# Patient Record
Sex: FEMALE | Race: WHITE | NOT HISPANIC OR LATINO | Employment: PART TIME | URBAN - METROPOLITAN AREA
[De-identification: names, ages, dates, MRNs, and addresses within clinical notes are randomized per-mention and may not be internally consistent; named-entity substitution may affect disease eponyms.]

---

## 2017-01-03 ENCOUNTER — HOSPITAL ENCOUNTER (EMERGENCY)
Facility: HOSPITAL | Age: 57
Discharge: HOME | End: 2017-01-03
Attending: EMERGENCY MEDICINE

## 2017-01-03 ENCOUNTER — APPOINTMENT (OUTPATIENT)
Dept: GENERAL RADIOLOGY | Facility: HOSPITAL | Age: 57
End: 2017-01-03

## 2017-01-03 VITALS
HEART RATE: 68 BPM | RESPIRATION RATE: 18 BRPM | SYSTOLIC BLOOD PRESSURE: 166 MMHG | HEIGHT: 68 IN | OXYGEN SATURATION: 98 % | DIASTOLIC BLOOD PRESSURE: 94 MMHG | WEIGHT: 210 LBS | BODY MASS INDEX: 31.83 KG/M2 | TEMPERATURE: 98.8 F

## 2017-01-03 DIAGNOSIS — S99.921A INJURY OF TOENAIL, RIGHT, INITIAL ENCOUNTER: Primary | ICD-10-CM

## 2017-01-03 PROCEDURE — 73660 X-RAY EXAM OF TOE(S): CPT

## 2017-01-03 PROCEDURE — 99283 EMERGENCY DEPT VISIT LOW MDM: CPT | Performed by: EMERGENCY MEDICINE

## 2017-01-03 PROCEDURE — 99283 EMERGENCY DEPT VISIT LOW MDM: CPT

## 2017-01-03 PROCEDURE — 11730 AVULSION NAIL PLATE SIMPLE 1: CPT | Performed by: EMERGENCY MEDICINE

## 2017-01-03 PROCEDURE — 11730 AVULSION NAIL PLATE SIMPLE 1: CPT

## 2017-01-03 RX ORDER — BACITRACIN ZINC 500 [USP'U]/G
1 OINTMENT TOPICAL ONCE
Status: DISCONTINUED | OUTPATIENT
Start: 2017-01-03 | End: 2017-01-03 | Stop reason: HOSPADM

## 2017-01-03 RX ORDER — CEPHALEXIN 500 MG/1
500 CAPSULE ORAL ONCE
Status: COMPLETED | OUTPATIENT
Start: 2017-01-03 | End: 2017-01-03

## 2017-01-03 RX ORDER — BUPIVACAINE HYDROCHLORIDE 5 MG/ML
10 INJECTION, SOLUTION EPIDURAL; INTRACAUDAL ONCE
Status: DISCONTINUED | OUTPATIENT
Start: 2017-01-03 | End: 2017-01-03 | Stop reason: HOSPADM

## 2017-01-03 RX ORDER — CEPHALEXIN 500 MG/1
500 CAPSULE ORAL 4 TIMES DAILY
Qty: 28 CAPSULE | Refills: 0 | Status: SHIPPED | OUTPATIENT
Start: 2017-01-03 | End: 2017-01-10

## 2017-01-03 RX ADMIN — CEPHALEXIN 500 MG: 500 CAPSULE ORAL at 04:52

## 2017-01-03 NOTE — ED PROVIDER NOTES
Subjective   History of Present Illness  History of Present Illness    Chief complaint: Toenail injury    Location: Right great toe    Quality/Severity:  Moderate pain and bleeding    Timing/Duration: Occurred about 2 hours ago    Modifying Factors: worse w/ bearing weight or ambulation    Narrative: Patient presents for evaluation after an accidental injury to her toe occurred at home this evening.  Apparently she and her son were trying to move a box spring on the floor and it accidentally moved quickly against her foot while she was barefoot.  This caused an immediate injury to her right great toe and actually pushed the toenail entirely backwards into a flipped up position.  There was some immediate bleeding and pain.  She pushed the toenail back down into it's correct position and held pressure to stop the bleeding.  She denies any other areas of injury or pain at this time.    Associated Symptoms: As above    Review of Systems   Constitutional: Negative for activity change, appetite change and fever.   HENT: Negative.    Eyes: Negative for pain and visual disturbance.   Respiratory: Negative for cough and shortness of breath.    Cardiovascular: Negative for chest pain.   Gastrointestinal: Negative for abdominal pain.   Genitourinary: Negative for dysuria.   Musculoskeletal: Positive for arthralgias and gait problem (due to toe pain).   Skin: Positive for wound. Negative for color change and rash.   Neurological: Negative for syncope and headaches.   All other systems reviewed and are negative.      Past Medical History   Diagnosis Date   • Diabetes mellitus    • Herpes zoster    • Hyperlipidemia    • Hypertension    • Myocardial infarction        No Known Allergies    Past Surgical History   Procedure Laterality Date   • Coronary artery bypass graft     •  section     • Knee surgery     • Breast lumpectomy     • Cardiac surgery  2014   • Breast lumpectomy       for benign lump       Family  History   Problem Relation Age of Onset   • Hypertension Father    • Diabetes Father    • Glaucoma Father    • Hypotension Father    • Kidney failure Father    • Anxiety disorder Father    • Hypertension Sister    • Diabetes Sister    • Anxiety disorder Sister    • Osteoporosis Mother    • Cataracts Mother    • Heart failure Mother        Social History     Social History   • Marital status: Legally      Spouse name: N/A   • Number of children: N/A   • Years of education: N/A     Social History Main Topics   • Smoking status: Former Smoker     Packs/day: 1.00     Years: 15.00     Quit date: 7/9/2014   • Smokeless tobacco: None   • Alcohol use Yes   • Drug use: No   • Sexual activity: Not Asked     Other Topics Concern   • None     Social History Narrative       ED Triage Vitals   Temp Heart Rate Resp BP SpO2   01/03/17 0037 01/03/17 0037 01/03/17 0037 01/03/17 0037 01/03/17 0037   98.8 °F (37.1 °C) 68 18 166/94 98 %      Temp src Heart Rate Source Patient Position BP Location FiO2 (%)   -- -- -- -- --                Objective   Physical Exam   Constitutional: She is oriented to person, place, and time. She appears well-developed and well-nourished.   HENT:   Head: Normocephalic and atraumatic.   Eyes: EOM are normal. Pupils are equal, round, and reactive to light. Right eye exhibits no discharge. Left eye exhibits no discharge.   Neck: Normal range of motion. Neck supple.   Cardiovascular: Normal rate and regular rhythm.    Pulmonary/Chest: Effort normal. No respiratory distress.   Musculoskeletal: Normal range of motion. She exhibits tenderness. She exhibits no edema or deformity.   The right great toe is diffusely tender at the distal phalanx.  There is some mild swelling present.  The toenail itself shows a moderate injury with some hematoma underneath the nail and the nail entirely loose with only its most proximal aspect attached underneath the cuticle.  There is no visible nailbed laceration evident  under bloodless field.  Patient can move her toe through full range of motion in flexion and extension.  Distal sensation is intact to the tip of the toe.   Neurological: She is alert and oriented to person, place, and time.   Skin: Skin is warm and dry. No rash noted. No erythema.   Psychiatric: She has a normal mood and affect. Her behavior is normal. Judgment and thought content normal.   Nursing note and vitals reviewed.    RADIOLOGY        Study: X-ray toe    Findings: No fracture or dislocation    Interpreted Contemporaneously by myself, independently viewed by me        Nail Removal  Date/Time: 1/3/2017 3:45 AM  Performed by: JOANIE BRAN  Authorized by: JOANIE BRAN   Consent: Verbal consent obtained.  Risks and benefits: risks, benefits and alternatives were discussed  Consent given by: patient  Patient understanding: patient states understanding of the procedure being performed  Patient consent: the patient's understanding of the procedure matches consent given  Procedure consent: procedure consent matches procedure scheduled  Patient identity confirmed: verbally with patient  Location: right foot  Location details: right big toe  Anesthesia: digital block    Anesthesia:  Anesthesia: digital block  Local Anesthetic: bupivacaine 0.5% without epinephrine   Sedation:  Patient sedated: no    Preparation: skin prepped with alcohol  Amount removed: partial (removed the distal 2/3)  Wedge excision of skin of nail fold: no  Nail bed sutured: no  Removed nail replaced and anchored: preserved proximal nail was well anchored under the cuticle by innate atttachment.  Dressing: antibiotic ointment and 4x4  Patient tolerance: Patient tolerated the procedure well with no immediate complications (post-op shoe provided)               ED Course  ED Course                  MDM    Final diagnoses:   Injury of toenail, right, initial encounter            Joanie Bran MD  01/03/17 0451

## 2017-01-03 NOTE — DISCHARGE INSTRUCTIONS
Keep toenail wound clean and covered with dressing for protection.  Please return to the ER for any worsening pain, redness, swelling, fevers, drainage, or any other concerns.

## 2017-01-18 RX ORDER — CARVEDILOL 3.12 MG/1
TABLET ORAL
Qty: 60 TABLET | Refills: 4 | Status: SHIPPED | OUTPATIENT
Start: 2017-01-18 | End: 2017-06-20 | Stop reason: SDUPTHER

## 2017-01-18 RX ORDER — LISINOPRIL 10 MG/1
TABLET ORAL
Qty: 60 TABLET | Refills: 4 | Status: SHIPPED | OUTPATIENT
Start: 2017-01-18 | End: 2017-06-21 | Stop reason: SDUPTHER

## 2017-02-17 RX ORDER — ATORVASTATIN CALCIUM 40 MG/1
TABLET, FILM COATED ORAL
Qty: 30 TABLET | Refills: 3 | Status: SHIPPED | OUTPATIENT
Start: 2017-02-17 | End: 2017-06-20 | Stop reason: SDUPTHER

## 2017-04-20 RX ORDER — PANTOPRAZOLE SODIUM 40 MG/1
TABLET, DELAYED RELEASE ORAL
Qty: 30 TABLET | Refills: 3 | Status: SHIPPED | OUTPATIENT
Start: 2017-04-20 | End: 2017-09-25 | Stop reason: SDUPTHER

## 2017-05-14 ENCOUNTER — APPOINTMENT (OUTPATIENT)
Dept: GENERAL RADIOLOGY | Facility: HOSPITAL | Age: 57
End: 2017-05-14

## 2017-05-14 ENCOUNTER — HOSPITAL ENCOUNTER (EMERGENCY)
Facility: HOSPITAL | Age: 57
Discharge: HOME | End: 2017-05-14
Attending: EMERGENCY MEDICINE | Admitting: EMERGENCY MEDICINE

## 2017-05-14 VITALS
SYSTOLIC BLOOD PRESSURE: 172 MMHG | HEIGHT: 68 IN | WEIGHT: 200 LBS | BODY MASS INDEX: 30.31 KG/M2 | OXYGEN SATURATION: 96 % | DIASTOLIC BLOOD PRESSURE: 86 MMHG | TEMPERATURE: 98.2 F | HEART RATE: 65 BPM | RESPIRATION RATE: 18 BRPM

## 2017-05-14 DIAGNOSIS — S53.401A ELBOW SPRAIN, RIGHT, INITIAL ENCOUNTER: Primary | ICD-10-CM

## 2017-05-14 PROCEDURE — 73080 X-RAY EXAM OF ELBOW: CPT

## 2017-05-14 PROCEDURE — 99283 EMERGENCY DEPT VISIT LOW MDM: CPT

## 2017-05-14 PROCEDURE — 99284 EMERGENCY DEPT VISIT MOD MDM: CPT | Performed by: EMERGENCY MEDICINE

## 2017-05-14 RX ORDER — HYDROCODONE BITARTRATE AND ACETAMINOPHEN 5; 325 MG/1; MG/1
1-2 TABLET ORAL EVERY 4 HOURS PRN
Qty: 20 TABLET | Refills: 0 | Status: SHIPPED | OUTPATIENT
Start: 2017-05-14 | End: 2017-07-14

## 2017-06-20 RX ORDER — ATORVASTATIN CALCIUM 40 MG/1
TABLET, FILM COATED ORAL
Qty: 30 TABLET | Refills: 0 | Status: SHIPPED | OUTPATIENT
Start: 2017-06-20 | End: 2017-07-22 | Stop reason: SDUPTHER

## 2017-06-20 RX ORDER — CARVEDILOL 3.12 MG/1
TABLET ORAL
Qty: 60 TABLET | Refills: 5 | Status: SHIPPED | OUTPATIENT
Start: 2017-06-20 | End: 2018-01-06 | Stop reason: SDUPTHER

## 2017-06-21 RX ORDER — LISINOPRIL 10 MG/1
TABLET ORAL
Qty: 60 TABLET | Refills: 3 | Status: SHIPPED | OUTPATIENT
Start: 2017-06-21 | End: 2017-10-29 | Stop reason: SDUPTHER

## 2017-06-26 ENCOUNTER — TELEPHONE (OUTPATIENT)
Dept: INTERNAL MEDICINE | Facility: CLINIC | Age: 57
End: 2017-06-26

## 2017-06-26 NOTE — TELEPHONE ENCOUNTER
----- Message from Renay Barbosa MA sent at 6/26/2017  1:18 PM EDT -----      ----- Message -----     From: Ashleigh Pace     Sent: 6/26/2017  12:34 PM       [facility]    PATIENT NEEDS A NEW METER AND TEST STRIPS BECAUSE HERS WILL NOT WORK ANYMORE.  SHE SAID SHE CHECKS ABOUT 4 X DAY.  SHE REALLY WANTS THE CHEAPEST ONE SHE CAN GET PLEASE..  [facility] IN [place]    [phone]  IF NO ANSWER, PLEASE LEAVE A MESSAGE      FAXED SCRIPT TO [facility], LEFT MESSAGE FOR PT

## 2017-07-14 ENCOUNTER — OFFICE VISIT (OUTPATIENT)
Dept: INTERNAL MEDICINE | Facility: CLINIC | Age: 57
End: 2017-07-14

## 2017-07-14 ENCOUNTER — TELEPHONE (OUTPATIENT)
Dept: INTERNAL MEDICINE | Facility: CLINIC | Age: 57
End: 2017-07-14

## 2017-07-14 ENCOUNTER — HOSPITAL ENCOUNTER (OUTPATIENT)
Dept: GENERAL RADIOLOGY | Facility: HOSPITAL | Age: 57
Discharge: HOME | End: 2017-07-14
Attending: INTERNAL MEDICINE | Admitting: INTERNAL MEDICINE

## 2017-07-14 VITALS
DIASTOLIC BLOOD PRESSURE: 82 MMHG | OXYGEN SATURATION: 98 % | BODY MASS INDEX: 32.16 KG/M2 | HEART RATE: 52 BPM | HEIGHT: 68 IN | WEIGHT: 212.2 LBS | SYSTOLIC BLOOD PRESSURE: 132 MMHG | TEMPERATURE: 97.7 F

## 2017-07-14 DIAGNOSIS — M79.671 FOOT PAIN, RIGHT: Primary | ICD-10-CM

## 2017-07-14 DIAGNOSIS — E78.5 HYPERLIPIDEMIA, UNSPECIFIED HYPERLIPIDEMIA TYPE: ICD-10-CM

## 2017-07-14 DIAGNOSIS — I10 ESSENTIAL HYPERTENSION: ICD-10-CM

## 2017-07-14 DIAGNOSIS — G57.91 NEUROPATHY OF FOOT, RIGHT: ICD-10-CM

## 2017-07-14 DIAGNOSIS — IMO0001 UNCONTROLLED TYPE 2 DIABETES MELLITUS WITHOUT COMPLICATION, WITH LONG-TERM CURRENT USE OF INSULIN: ICD-10-CM

## 2017-07-14 PROBLEM — T81.49XA POSTOPERATIVE WOUND INFECTION: Status: RESOLVED | Noted: 2017-07-14 | Resolved: 2017-07-14

## 2017-07-14 PROBLEM — E11.9 DIABETES MELLITUS (HCC): Status: RESOLVED | Noted: 2017-07-14 | Resolved: 2017-07-14

## 2017-07-14 PROBLEM — R07.9 CHEST PAIN: Status: ACTIVE | Noted: 2017-07-14

## 2017-07-14 PROBLEM — J30.2 SEASONAL ALLERGIC RHINITIS: Status: ACTIVE | Noted: 2017-07-14

## 2017-07-14 PROBLEM — T14.8XXA OPEN WOUND: Status: ACTIVE | Noted: 2017-07-14

## 2017-07-14 PROBLEM — M54.2 NECK PAIN: Status: ACTIVE | Noted: 2017-07-14

## 2017-07-14 PROBLEM — T81.49XA POSTOPERATIVE WOUND INFECTION: Status: ACTIVE | Noted: 2017-07-14

## 2017-07-14 PROBLEM — E11.9 TYPE 2 DIABETES MELLITUS: Status: ACTIVE | Noted: 2017-07-14

## 2017-07-14 PROBLEM — G57.90 NEUROPATHY OF FOOT: Status: ACTIVE | Noted: 2017-07-14

## 2017-07-14 PROBLEM — R07.9 CHEST PAIN: Status: RESOLVED | Noted: 2017-07-14 | Resolved: 2017-07-14

## 2017-07-14 PROBLEM — M75.80 ROTATOR CUFF TENDONITIS: Status: ACTIVE | Noted: 2017-07-14

## 2017-07-14 PROBLEM — T14.8XXA OPEN WOUND: Status: RESOLVED | Noted: 2017-07-14 | Resolved: 2017-07-14

## 2017-07-14 PROBLEM — E11.9 DIABETES MELLITUS (HCC): Status: ACTIVE | Noted: 2017-07-14

## 2017-07-14 LAB — HBA1C MFR BLD: 7.8 %

## 2017-07-14 PROCEDURE — 83036 HEMOGLOBIN GLYCOSYLATED A1C: CPT | Performed by: INTERNAL MEDICINE

## 2017-07-14 PROCEDURE — 73630 X-RAY EXAM OF FOOT: CPT

## 2017-07-14 PROCEDURE — 99214 OFFICE O/P EST MOD 30 MIN: CPT | Performed by: INTERNAL MEDICINE

## 2017-07-14 RX ORDER — CHOLECALCIFEROL (VITAMIN D3) 125 MCG
5 CAPSULE ORAL NIGHTLY
COMMUNITY

## 2017-07-14 NOTE — TELEPHONE ENCOUNTER
----- Message from Daysi Cardenas MD sent at 7/14/2017  1:48 PM EDT -----  Please call patient with these results and let her know that her x-ray is normal, but she does have some bone spurs on the back of her heal. Sometimes these will get better with PT and stretching. If she would like to see PT, we can place order. Otherwise, nothing further to do.    Pt wants to go to foot doctor instead of [facility] so gave the info to [facility] for referral.papi

## 2017-07-14 NOTE — PROGRESS NOTES
"Subjective     Kristine Rivas is a 57 y.o. female, who presents with a chief complaint of   Chief Complaint   Patient presents with   • Follow-up     Dr. Duckworth pt, DM f/u, patient is fasting    • Diabetes     x questions    • Pain     R foot, \"a while now\", throbing, \"spots\" on heel x questions, worse at night after work        HPI Comments: 56 yo F with diabetes here for follow up. Her mother passed in January and it has been hard for her to make follow ups. Her eye exam is up to date and was normal.     She takes her BG's around 10 am and it runs around 100-120. She checks it 4 times per day and goes up throughout the day.She takes 28 units of NPH at night and 15-20 for breakfast and lunch and then 25-45 at night. She buys NPH over the counter because it is cheap.     She started having some right sided heal pain starting 2 months ago. She then started having some numbness on the outer part of her foot.She has nodules around the heel. All of her pain is worse when she is on her feet. She stands 9-10 hours on her feet.     HTN is chronic and under good control. No headache or dizziness. No CP or SOB.       The following portions of the patient's history were reviewed and updated as appropriate: allergies, current medications, past family history, past medical history, past social history, past surgical history and problem list.    Allergies: Review of patient's allergies indicates no known allergies.    Current Outpatient Prescriptions:   •  acetaminophen (TYLENOL) 500 MG tablet, Take 1,000 mg by mouth 2 (Two) Times a Day., Disp: , Rfl:   •  aspirin 81 MG tablet, Take 1 tablet by mouth daily., Disp: , Rfl:   •  atorvastatin (LIPITOR) 40 MG tablet, TAKE ONE TABLET BY MOUTH DAILY, Disp: 30 tablet, Rfl: 0  •  carvedilol (COREG) 3.125 MG tablet, TAKE ONE TABLET BY MOUTH TWICE A DAY, Disp: 60 tablet, Rfl: 5  •  Fexofenadine HCl (MUCINEX ALLERGY PO), Take  by mouth As Needed., Disp: , Rfl:   •  insulin NPH (humuLIN " "N,novoLIN N) 100 UNIT/ML injection, Inject 30 Units under the skin Every Night., Disp: , Rfl:   •  insulin regular (humuLIN R,novoLIN R) 100 UNIT/ML injection, Inject 15 Units under the skin 3 (Three) Times a Day Before Meals. 15-20 units at breakfast  15-20 units at lunch  25-45 units at dinner, Disp: , Rfl:   •  lisinopril (PRINIVIL,ZESTRIL) 10 MG tablet, TAKE ONE TABLET BY MOUTH TWICE A DAY, Disp: 60 tablet, Rfl: 3  •  melatonin 5 MG tablet tablet, Take 5 mg by mouth., Disp: , Rfl:   •  pantoprazole (PROTONIX) 40 MG EC tablet, TAKE ONE TABLET BY MOUTH DAILY, Disp: 30 tablet, Rfl: 3  Medications Discontinued During This Encounter   Medication Reason   • azithromycin (ZITHROMAX Z-ANGY) 250 MG tablet    • HYDROcodone-acetaminophen (NORCO) 5-325 MG per tablet    • insulin NPH (HumuLIN,NovoLIN) 100 UNIT/ML injection    • insulin regular (HumuLIN R,NovoLIN R) 100 UNIT/ML injection    • insulin regular (humuLIN R,novoLIN R) 100 UNIT/ML injection        Review of Systems   Constitutional: Negative for chills and fever.   HENT: Negative for congestion and rhinorrhea.    Respiratory: Negative for cough and shortness of breath.    Cardiovascular: Negative for chest pain and palpitations.   Musculoskeletal: Positive for arthralgias (Right foot pain in heal and tingling and numbness ).       Objective     /82 (BP Location: Left arm, Patient Position: Sitting, Cuff Size: Adult)  Pulse 52  Temp 97.7 °F (36.5 °C) (Oral)   Ht 68\" (172.7 cm)  Wt 212 lb 3.2 oz (96.3 kg)  SpO2 98%  BMI 32.26 kg/m2      Physical Exam   Constitutional: She is oriented to person, place, and time. She appears well-developed and well-nourished. No distress.   HENT:   Head: Normocephalic and atraumatic.   Right Ear: External ear normal.   Left Ear: External ear normal.   Mouth/Throat: Oropharynx is clear and moist. No oropharyngeal exudate.   Eyes: Conjunctivae are normal. Right eye exhibits no discharge. Left eye exhibits no discharge. No " scleral icterus.   Neck: Neck supple.   Cardiovascular: Normal rate, regular rhythm and normal heart sounds.  Exam reveals no gallop and no friction rub.    No murmur heard.  Pulmonary/Chest: Effort normal and breath sounds normal. No respiratory distress. She has no wheezes. She has no rales.   Central chest scar from CABG     Kristine had a diabetic foot exam performed today.   During the foot exam she had a monofilament test performed.    Neurological Sensory Findings - Unaltered hot/cold right ankle/foot discrimination. Unaltered sharp/dull right ankle/foot discrimination (Decreased sensation in the outer lateral foot) and unaltered sharp/dull left ankle/foot discrimination. No right ankle/foot altered proprioception and no left ankle/foot altered proprioception    Vascular Status -  Her exam exhibits right foot vasculature normal. Her exam exhibits no right foot edema. Her exam exhibits left foot vasculature normal. Her exam exhibits no left foot edema.   Skin Integrity  -  Her right foot skin is not intact.   Kristine's left foot skin is not intact. .  Lymphadenopathy:     She has no cervical adenopathy.   Neurological: She is alert and oriented to person, place, and time.   Skin: Skin is warm. No rash noted.   Psychiatric: She has a normal mood and affect. Her behavior is normal.   Nursing note and vitals reviewed.        Results for orders placed or performed in visit on 07/14/17   POC Glycosylated Hemoglobin (Hb A1C)   Result Value Ref Range    Hemoglobin A1C 7.8 %       Assessment/Plan   Kristine was seen today for follow-up, diabetes and pain.    Diagnoses and all orders for this visit:    Foot pain, right  -     XR Foot 3+ View Right    Uncontrolled type 2 diabetes mellitus without complication, with long-term current use of insulin  -     CBC & Differential  -     Comprehensive Metabolic Panel  -     Urinalysis With / Culture If Indicated  -     POC Glycosylated Hemoglobin (Hb A1C)    Hyperlipidemia,  unspecified hyperlipidemia type  -     Lipid Panel With LDL / HDL Ratio    Essential hypertension  -     Comprehensive Metabolic Panel    Neuropathy of foot, right        Her foot is likely related to bone spurs or diabetes. Will check x-ray and follow up after that. Differential includes diabetic neuropathy vs injury vs arthritis vs peripheral neuropathy. She may need podiatry or ortho referral and will consider Gabapentin for pain.     Patient's type 2 diabetes is under fair control . Will continue current regimen of NPH, but increase to 30 units and go up to 32 if she is still having high fasting and continue SSI with humulin.No reported side effects from medications. Will follow up in 3 months and needs CBC, CMP, HgA1c, lipids and micro albumin then.     Will check lipids today and will continue statin. Will call with results.     HTN is under good control and patient will continue to monitor with home BP cuff. No side effects from medications. Will continue current regimen of Coreg and Lisinopril. Will follow up in 3 months.     See 3 weeks back for pap and well women check due to some concerns about vaginal dryness.                      Return in about 3 months (around 10/14/2017) for Recheck with Dr. Duckworth with labs before .    Daysi Cardenas MD  07/14/2017

## 2017-07-14 NOTE — PROGRESS NOTES
Please call patient with these results and let her know that her x-ray is normal, but she does have some bone spurs on the back of her heal. Sometimes these will get better with PT and stretching. If she would like to see PT, we can place order. Otherwise, nothing further to do.

## 2017-07-15 LAB
ALBUMIN SERPL-MCNC: 4.4 G/DL (ref 3.5–5.2)
ALBUMIN/GLOB SERPL: 1.9 G/DL
ALP SERPL-CCNC: 90 U/L (ref 40–129)
ALT SERPL-CCNC: 13 U/L (ref 5–33)
APPEARANCE UR: CLEAR
AST SERPL-CCNC: 13 U/L (ref 5–32)
BACTERIA #/AREA URNS HPF: NORMAL /HPF
BASOPHILS # BLD AUTO: 0.02 10*3/MM3 (ref 0–0.2)
BASOPHILS NFR BLD AUTO: 0.3 % (ref 0–2)
BILIRUB SERPL-MCNC: 0.5 MG/DL (ref 0.2–1.2)
BILIRUB UR QL STRIP: NEGATIVE
BUN SERPL-MCNC: 16 MG/DL (ref 6–20)
BUN/CREAT SERPL: 21.9 (ref 7–25)
CALCIUM SERPL-MCNC: 9 MG/DL (ref 8.6–10.5)
CHLORIDE SERPL-SCNC: 102 MMOL/L (ref 98–107)
CHOLEST SERPL-MCNC: 146 MG/DL (ref 0–200)
CO2 SERPL-SCNC: 27.7 MMOL/L (ref 22–29)
COLOR UR: YELLOW
CREAT SERPL-MCNC: 0.73 MG/DL (ref 0.57–1)
EOSINOPHIL # BLD AUTO: 0.27 10*3/MM3 (ref 0.1–0.3)
EOSINOPHIL NFR BLD AUTO: 4.6 % (ref 0–4)
EPI CELLS #/AREA URNS HPF: NORMAL /HPF
ERYTHROCYTE [DISTWIDTH] IN BLOOD BY AUTOMATED COUNT: 14.6 % (ref 11.5–14.5)
GLOBULIN SER CALC-MCNC: 2.3 GM/DL
GLUCOSE SERPL-MCNC: 152 MG/DL (ref 65–99)
GLUCOSE UR QL: NEGATIVE
HCT VFR BLD AUTO: 38 % (ref 37–47)
HDLC SERPL-MCNC: 59 MG/DL (ref 40–60)
HGB BLD-MCNC: 12 G/DL (ref 12–16)
HGB UR QL STRIP: NEGATIVE
IMM GRANULOCYTES # BLD: 0.02 10*3/MM3 (ref 0–0.03)
IMM GRANULOCYTES NFR BLD: 0.3 % (ref 0–0.5)
KETONES UR QL STRIP: NEGATIVE
LDLC SERPL CALC-MCNC: 70 MG/DL (ref 0–100)
LDLC/HDLC SERPL: 1.19 {RATIO}
LEUKOCYTE ESTERASE UR QL STRIP: NEGATIVE
LYMPHOCYTES # BLD AUTO: 2.39 10*3/MM3 (ref 0.6–4.8)
LYMPHOCYTES NFR BLD AUTO: 40.4 % (ref 20–45)
MCH RBC QN AUTO: 26.3 PG (ref 27–31)
MCHC RBC AUTO-ENTMCNC: 31.6 G/DL (ref 31–37)
MCV RBC AUTO: 83.3 FL (ref 81–99)
MICRO URNS: NORMAL
MICRO URNS: NORMAL
MONOCYTES # BLD AUTO: 0.39 10*3/MM3 (ref 0–1)
MONOCYTES NFR BLD AUTO: 6.6 % (ref 3–8)
MUCOUS THREADS URNS QL MICRO: PRESENT /HPF
NEUTROPHILS # BLD AUTO: 2.82 10*3/MM3 (ref 1.5–8.3)
NEUTROPHILS NFR BLD AUTO: 47.8 % (ref 45–70)
NITRITE UR QL STRIP: NEGATIVE
NRBC BLD AUTO-RTO: 0 /100 WBC (ref 0–0)
PH UR STRIP: 6 [PH] (ref 5–7.5)
PLATELET # BLD AUTO: 286 10*3/MM3 (ref 140–500)
POTASSIUM SERPL-SCNC: 4.7 MMOL/L (ref 3.5–5.2)
PROT SERPL-MCNC: 6.7 G/DL (ref 6–8.5)
PROT UR QL STRIP: NEGATIVE
RBC # BLD AUTO: 4.56 10*6/MM3 (ref 4.2–5.4)
RBC #/AREA URNS HPF: NORMAL /HPF
SODIUM SERPL-SCNC: 142 MMOL/L (ref 136–145)
SP GR UR: 1.02 (ref 1–1.03)
TRIGL SERPL-MCNC: 83 MG/DL (ref 0–150)
URINALYSIS REFLEX: NORMAL
UROBILINOGEN UR STRIP-MCNC: 0.2 MG/DL (ref 0.2–1)
VLDLC SERPL CALC-MCNC: 16.6 MG/DL (ref 7–27)
WBC # BLD AUTO: 5.91 10*3/MM3 (ref 4.8–10.8)
WBC #/AREA URNS HPF: NORMAL /HPF

## 2017-07-16 ENCOUNTER — HOSPITAL ENCOUNTER (EMERGENCY)
Facility: HOSPITAL | Age: 57
Discharge: HOME | End: 2017-07-16
Attending: EMERGENCY MEDICINE | Admitting: EMERGENCY MEDICINE

## 2017-07-16 VITALS
BODY MASS INDEX: 32.08 KG/M2 | HEIGHT: 68 IN | OXYGEN SATURATION: 95 % | SYSTOLIC BLOOD PRESSURE: 157 MMHG | DIASTOLIC BLOOD PRESSURE: 96 MMHG | RESPIRATION RATE: 20 BRPM | HEART RATE: 63 BPM | TEMPERATURE: 98.1 F | WEIGHT: 211.64 LBS

## 2017-07-16 DIAGNOSIS — M43.6 LEFT TORTICOLLIS: Primary | ICD-10-CM

## 2017-07-16 PROCEDURE — 99283 EMERGENCY DEPT VISIT LOW MDM: CPT

## 2017-07-16 PROCEDURE — 99282 EMERGENCY DEPT VISIT SF MDM: CPT | Performed by: EMERGENCY MEDICINE

## 2017-07-16 RX ORDER — TRAMADOL HYDROCHLORIDE 50 MG/1
TABLET ORAL
Qty: 24 TABLET | Refills: 0 | Status: SHIPPED | OUTPATIENT
Start: 2017-07-16 | End: 2019-04-15

## 2017-07-16 RX ORDER — DICLOFENAC SODIUM 75 MG/1
75 TABLET, DELAYED RELEASE ORAL 2 TIMES DAILY PRN
Qty: 20 TABLET | Refills: 0 | OUTPATIENT
Start: 2017-07-16 | End: 2017-12-13

## 2017-07-16 RX ORDER — METAXALONE 800 MG/1
800 TABLET ORAL 4 TIMES DAILY PRN
Qty: 24 TABLET | Refills: 0 | Status: SHIPPED | OUTPATIENT
Start: 2017-07-16 | End: 2019-04-23

## 2017-07-16 NOTE — ED PROVIDER NOTES
Subjective     History provided by:  Patient    History of Present Illness    · Chief complaint: Neck pain    · Location: Neck pain behind the left ear.    · Quality/Severity: Sharp pain     · Timing/Onset: The pain started yesterday afternoon.    · Modifying Factors: Turning her head in either direction exacerbates the pain.    · Associated symptoms: The patient reports a fullness sensation in her left ear and wonders if that might be causing the left neck pain.    · Narrative: The patient is a 57-year-old white female use complaining of left-sided neck pain behind her left ear since yesterday afternoon.  The pain is exacerbated by any movement of her head.  It is improved by applying pressure to the left side of the neck and not moving her head.  She reports a pressure sensation in her left ear, and wonders if that might be causing discomfort.  The patient states she's been sleeping in a more awkward position as her pelvis to become flat.  She denies any hearing deficit or ringing in her ears.    ED Triage Vitals   Temp Heart Rate Resp BP SpO2   07/16/17 1711 07/16/17 1711 07/16/17 1711 07/16/17 1711 07/16/17 1711   98.1 °F (36.7 °C) 63 20 157/96 95 %      Temp src Heart Rate Source Patient Position BP Location FiO2 (%)   07/16/17 1711 -- 07/16/17 1711 07/16/17 1711 --   Oral  Sitting Right arm        Review of Systems   Constitutional: Negative for activity change, appetite change, chills, diaphoresis, fatigue and fever.   HENT: Negative for congestion, dental problem, ear discharge, ear pain, hearing loss, mouth sores, postnasal drip, rhinorrhea, sinus pressure, sore throat, tinnitus, trouble swallowing and voice change.    Eyes: Negative for photophobia, pain, discharge, redness and visual disturbance.   Respiratory: Negative for cough, chest tightness, shortness of breath, wheezing and stridor.    Cardiovascular: Negative for chest pain, palpitations and leg swelling.   Gastrointestinal: Negative for abdominal  pain, diarrhea, nausea and vomiting.   Genitourinary: Negative for difficulty urinating, dysuria, flank pain, frequency, hematuria and urgency.   Musculoskeletal: Positive for neck pain and neck stiffness. Negative for arthralgias, back pain, gait problem, joint swelling and myalgias.   Skin: Negative for color change and rash.   Neurological: Negative for dizziness, tremors, seizures, syncope, facial asymmetry, speech difficulty, weakness, light-headedness, numbness and headaches.   Hematological: Negative for adenopathy.   Psychiatric/Behavioral: Negative.  Negative for confusion and decreased concentration. The patient is not nervous/anxious.        Past Medical History:   Diagnosis Date   • Diabetes mellitus    • Herpes zoster    • Hyperlipidemia    • Hypertension    • Myocardial infarction        No Known Allergies    Past Surgical History:   Procedure Laterality Date   • BREAST LUMPECTOMY     • BREAST LUMPECTOMY      for benign lump   • CARDIAC SURGERY  2014   •  SECTION     • CORONARY ARTERY BYPASS GRAFT     • KNEE SURGERY         Family History   Problem Relation Age of Onset   • Hypertension Father    • Diabetes Father    • Glaucoma Father    • Hypotension Father    • Kidney failure Father    • Anxiety disorder Father    • Hypertension Sister    • Diabetes Sister    • Anxiety disorder Sister    • Osteoporosis Mother    • Cataracts Mother    • Heart failure Mother        Social History     Social History   • Marital status: Legally      Spouse name: N/A   • Number of children: N/A   • Years of education: N/A     Social History Main Topics   • Smoking status: Former Smoker     Packs/day: 1.00     Years: 15.00     Quit date: 2014   • Smokeless tobacco: None   • Alcohol use Yes   • Drug use: No   • Sexual activity: Not Asked     Other Topics Concern   • None     Social History Narrative           Objective   Physical Exam   Constitutional: She is oriented to person, place, and time.  She appears well-developed and well-nourished. No distress.   HENT:   Head: Normocephalic and atraumatic.   Right Ear: External ear normal.   Left Ear: External ear normal.   Nose: Nose normal.   Mouth/Throat: Oropharynx is clear and moist.   Both tympanic membranes are normal in appearance.  There is no inflammation or exudate in the external canals.   Eyes: Conjunctivae and EOM are normal. Pupils are equal, round, and reactive to light.   Neck:   The patient has spasm of the left lateral and posterior cervical musculature that resists any movement due to pain.  There is no tenderness to palpation of the musculature and soft tissue of either side of the neck, nor of the cervical spine.  Is no palpable lymphadenopathy or swelling.   Musculoskeletal: Normal range of motion. She exhibits no edema, tenderness or deformity.   Lymphadenopathy:     She has no cervical adenopathy.   Neurological: She is alert and oriented to person, place, and time. No cranial nerve deficit.   No focal motor sensory deficit   Skin: Skin is warm and dry. No rash noted. She is not diaphoretic. No erythema. No pallor.   Psychiatric: She has a normal mood and affect. Her behavior is normal. Judgment and thought content normal.   Nursing note and vitals reviewed.      Procedures         ED Course  ED Course   Comment By Time   Juventino Report [id number] is blank Harshad Miles MD 07/16 5032                  MDM  Number of Diagnoses or Management Options  Left torticollis: new and does not require workup  Risk of Complications, Morbidity, and/or Mortality  Presenting problems: low  Diagnostic procedures: minimal  Management options: low    Patient Progress  Patient progress: stable      Final diagnoses:   Left torticollis           Labs Reviewed - No data to display  No orders to display          Medication List      New Prescriptions          diclofenac 75 MG EC tablet   Commonly known as:  VOLTAREN   Take 1 tablet by mouth 2 (Two) Times a Day As  Needed (Pain) for up to 20   doses.       metaxalone 800 MG tablet   Commonly known as:  SKELAXIN   Take 1 tablet by mouth 4 (Four) Times a Day As Needed for Muscle Spasms   for up to 24 doses.       traMADol 50 MG tablet   Commonly known as:  ULTRAM   1 - 2 tablets po q 4 hours PRN sever pain                Harshad Miles MD  07/16/17 1500

## 2017-07-17 ENCOUNTER — TELEPHONE (OUTPATIENT)
Dept: INTERNAL MEDICINE | Facility: CLINIC | Age: 57
End: 2017-07-17

## 2017-07-17 DIAGNOSIS — M77.51 OTHER ENTHESOPATHY OF RIGHT FOOT: Primary | ICD-10-CM

## 2017-07-17 NOTE — PROGRESS NOTES
Please call patient with these results and let her know that her labs look very good. Her cholesterol is continuing to get much better. I want her to continue the changes that we discussed and plan for follow up as scheduled.

## 2017-07-17 NOTE — TELEPHONE ENCOUNTER
Left message for patient to call us back for results.   ----- Message from Daysi Cardenas MD sent at 7/17/2017  7:58 AM EDT -----  Please call patient with these results and let her know that her labs look very good. Her cholesterol is continuing to get much better. I want her to continue the changes that we discussed and plan for follow up as scheduled.

## 2017-07-23 RX ORDER — ATORVASTATIN CALCIUM 40 MG/1
TABLET, FILM COATED ORAL
Qty: 90 TABLET | Refills: 0 | Status: SHIPPED | OUTPATIENT
Start: 2017-07-23 | End: 2017-10-31 | Stop reason: SDUPTHER

## 2017-08-04 ENCOUNTER — OFFICE VISIT (OUTPATIENT)
Dept: INTERNAL MEDICINE | Facility: CLINIC | Age: 57
End: 2017-08-04

## 2017-08-04 VITALS
OXYGEN SATURATION: 98 % | WEIGHT: 210.2 LBS | DIASTOLIC BLOOD PRESSURE: 90 MMHG | SYSTOLIC BLOOD PRESSURE: 144 MMHG | BODY MASS INDEX: 31.86 KG/M2 | HEART RATE: 62 BPM | HEIGHT: 68 IN

## 2017-08-04 DIAGNOSIS — Z78.0 POST-MENOPAUSAL: ICD-10-CM

## 2017-08-04 DIAGNOSIS — I10 ESSENTIAL HYPERTENSION: ICD-10-CM

## 2017-08-04 DIAGNOSIS — I25.10 CHRONIC CORONARY ARTERY DISEASE: ICD-10-CM

## 2017-08-04 DIAGNOSIS — E78.5 HYPERLIPIDEMIA, UNSPECIFIED HYPERLIPIDEMIA TYPE: ICD-10-CM

## 2017-08-04 DIAGNOSIS — E13.9 LATENT AUTOIMMUNE DIABETES IN ADULTS (LADA), MANAGED AS TYPE 1 (HCC): ICD-10-CM

## 2017-08-04 DIAGNOSIS — Z00.00 HEALTHCARE MAINTENANCE: Primary | ICD-10-CM

## 2017-08-04 PROCEDURE — 99396 PREV VISIT EST AGE 40-64: CPT | Performed by: INTERNAL MEDICINE

## 2017-08-04 NOTE — PROGRESS NOTES
Subjective     Kristine Rivas is a 57 y.o. female, who presents with a chief complaint of   Chief Complaint   Patient presents with   • Gynecologic Exam     Yearly Pap. Having some pain during intercourse, she would like to talk about that today       HPI   The pt is here for her pap/well woman exam.  Since her last OV her mother has passed away.  The pt is doing well dealing with this.  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     Her last pap was 2014.    She is menopausal and having vaginal dryness. + pain with sex  Last mammo 2015    Eye exam UTD  Dentist - due for dental exam    Colonoscopy - due for exam.  Her mat uncle had colon cancer.    tdap utd  She will do PNA shot when she gets her flu shot in the fall.      The following portions of the patient's history were reviewed and updated as appropriate: allergies, current medications, past family history, past medical history, past social history, past surgical history and problem list.    Allergies: Review of patient's allergies indicates no known allergies.    Review of Systems   Constitutional: Negative.    HENT: Negative.    Eyes: Negative.    Respiratory: Negative.    Cardiovascular: Negative.    Gastrointestinal: Negative.    Endocrine: Negative.    Genitourinary: Negative for vaginal discharge.        Vaginal dryness   Musculoskeletal: Negative.    Skin: Negative.    Allergic/Immunologic: Negative.    Neurological: Negative.    Hematological: Negative.    Psychiatric/Behavioral: Negative.    All other systems reviewed and are negative.      Objective     Wt Readings from Last 3 Encounters:   08/04/17 210 lb 3.2 oz (95.3 kg)   07/16/17 211 lb 10.3 oz (96 kg)   07/14/17 212 lb 3.2 oz (96.3 kg)     Temp Readings from Last 3 Encounters:   07/16/17 98.1 °F (36.7 °C) (Oral)   07/14/17 97.7 °F (36.5 °C) (Oral)   05/14/17 98.2 °F (36.8 °C) (Oral)     BP Readings from Last 3 Encounters:   08/04/17 144/90   07/16/17 157/96   07/14/17 132/82      Pulse Readings from Last 3 Encounters:   08/04/17 62   07/16/17 63   07/14/17 52     Body mass index is 31.96 kg/(m^2).  SpO2 Readings from Last 3 Encounters:   08/04/17 98%   07/16/17 95%   07/14/17 98%       Physical Exam   Constitutional: She is oriented to person, place, and time. She appears well-developed and well-nourished. No distress.   HENT:   Head: Normocephalic and atraumatic.   Right Ear: External ear normal.   Left Ear: External ear normal.   Nose: Nose normal.   Mouth/Throat: Oropharynx is clear and moist.   Eyes: Conjunctivae and EOM are normal. Pupils are equal, round, and reactive to light.   Neck: Normal range of motion. Neck supple.   Cardiovascular: Normal rate, regular rhythm, normal heart sounds and intact distal pulses.    Well healed midline chest incision   Pulmonary/Chest: Effort normal and breath sounds normal. No respiratory distress. She has no wheezes.   Genitourinary:   Genitourinary Comments: Mild atrophy at vaginal opening  Normal cervix  Normal bimanual exam   Musculoskeletal: Normal range of motion. She exhibits no edema.   Normal gait   Neurological: She is alert and oriented to person, place, and time. She has normal reflexes.   Skin: Skin is warm and dry.   Psychiatric: She has a normal mood and affect. Her behavior is normal. Judgment and thought content normal.   Nursing note and vitals reviewed.      Results for orders placed or performed in visit on 07/14/17   Comprehensive Metabolic Panel   Result Value Ref Range    Glucose 152 (H) 65 - 99 mg/dL    BUN 16 6 - 20 mg/dL    Creatinine 0.73 0.57 - 1.00 mg/dL    eGFR Non African Am 82 >60 mL/min/1.73    eGFR African Am 100 >60 mL/min/1.73    BUN/Creatinine Ratio 21.9 7.0 - 25.0    Sodium 142 136 - 145 mmol/L    Potassium 4.7 3.5 - 5.2 mmol/L    Chloride 102 98 - 107 mmol/L    Total CO2 27.7 22.0 - 29.0 mmol/L    Calcium 9.0 8.6 - 10.5 mg/dL    Total Protein 6.7 6.0 - 8.5 g/dL    Albumin 4.40 3.50 - 5.20 g/dL    Globulin 2.3  gm/dL    A/G Ratio 1.9 g/dL    Total Bilirubin 0.5 0.2 - 1.2 mg/dL    Alkaline Phosphatase 90 40 - 129 U/L    AST (SGOT) 13 5 - 32 U/L    ALT (SGPT) 13 5 - 33 U/L   Lipid Panel With LDL / HDL Ratio   Result Value Ref Range    Total Cholesterol 146 0 - 200 mg/dL    Triglycerides 83 0 - 150 mg/dL    HDL Cholesterol 59 40 - 60 mg/dL    VLDL Cholesterol 16.6 7 - 27 mg/dL    LDL Cholesterol  70 0 - 100 mg/dL    LDL/HDL Ratio 1.19    Urinalysis With / Culture If Indicated   Result Value Ref Range    Specific Gravity, UA 1.022 1.005 - 1.030    pH, UA 6.0 5.0 - 7.5    Color, UA Yellow Yellow    Appearance, UA Clear Clear    Leukocytes, UA Negative Negative    Protein Negative Negative/Trace    Glucose, UA Negative Negative    Ketones Negative Negative    Blood, UA Negative Negative    Bilirubin, UA Negative Negative    Urobilinogen, UA 0.2 0.2 - 1.0 mg/dL    Nitrite, UA Negative Negative    Microscopic Examination Comment     MICROSCOPIC EXAMINATION See below:     Urinalysis Reflex Comment    Microscopic Examination   Result Value Ref Range    WBC, UA 0-5 0 - 5 /hpf    RBC, UA 0-2 0 - 2 /hpf    Epithelial Cells (non renal) 0-10 0 - 10 /hpf    Mucus, UA Present Not Estab. /hpf    Bacteria, UA Few None seen/Few /hpf   POC Glycosylated Hemoglobin (Hb A1C)   Result Value Ref Range    Hemoglobin A1C 7.8 %   CBC & Differential   Result Value Ref Range    WBC 5.91 4.80 - 10.80 10*3/mm3    RBC 4.56 4.20 - 5.40 10*6/mm3    Hemoglobin 12.0 12.0 - 16.0 g/dL    Hematocrit 38.0 37.0 - 47.0 %    MCV 83.3 81.0 - 99.0 fL    MCH 26.3 (L) 27.0 - 31.0 pg    MCHC 31.6 31.0 - 37.0 g/dL    RDW 14.6 (H) 11.5 - 14.5 %    Platelets 286 140 - 500 10*3/mm3    Neutrophil Rel % 47.8 45.0 - 70.0 %    Lymphocyte Rel % 40.4 20.0 - 45.0 %    Monocyte Rel % 6.6 3.0 - 8.0 %    Eosinophil Rel % 4.6 (H) 0.0 - 4.0 %    Basophil Rel % 0.3 0.0 - 2.0 %    Neutrophils Absolute 2.82 1.50 - 8.30 10*3/mm3    Lymphocytes Absolute 2.39 0.60 - 4.80 10*3/mm3    Monocytes  Absolute 0.39 0.00 - 1.00 10*3/mm3    Eosinophils Absolute 0.27 0.10 - 0.30 10*3/mm3    Basophils Absolute 0.02 0.00 - 0.20 10*3/mm3    Immature Granulocyte Rel % 0.3 0.0 - 0.5 %    Immature Grans Absolute 0.02 0.00 - 0.03 10*3/mm3    nRBC 0.0 0.0 - 0.0 /100 WBC       Assessment/Plan   Kristine was seen today for gynecologic exam.    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), managed as type 1  -     Comprehensive Metabolic Panel; Future  -     CBC & Differential; Future  -     T4, Free; Future  -     TSH; Future  -     Microalbumin / Creatinine Urine Ratio  -     Lipid Panel With LDL / HDL Ratio; Future  -     Hemoglobin A1c; Future    Chronic coronary artery disease  -     Comprehensive Metabolic Panel; Future  -     Lipid Panel With LDL / HDL Ratio; Future    Hyperlipidemia, unspecified hyperlipidemia type  -     Comprehensive Metabolic Panel; Future  -     Lipid Panel With LDL / HDL Ratio; Future    Essential hypertension  -     Comprehensive Metabolic Panel; Future  -     CBC & Differential; Future  -     T4, Free; Future  -     TSH; Future  -     Lipid Panel With LDL / HDL Ratio; Future    Post-menopausal  -     Mammo Screening Bilateral With CAD; Future  -     Pap IG, Ct-Ng TV Rfx HPV All; Future  -     Pap IG, Ct-Ng TV Rfx HPV All      Trial of replens for vaginal dryness.  If this doesn't work consider premarin cream topically.  Pt has had CABG/CAD and is not a candidate for oral hormone replacement therapy.       Outpatient Medications Prior to Visit    Medication Sig Dispense Refill   • acetaminophen (TYLENOL) 500 MG tablet Take 1,000 mg by mouth 2 (Two) Times a Day.     • aspirin 81 MG tablet Take 1 tablet by mouth daily.     • atorvastatin (LIPITOR) 40 MG tablet TAKE ONE TABLET BY MOUTH DAILY 90 tablet 0   • carvedilol (COREG) 3.125 MG tablet TAKE ONE TABLET BY MOUTH TWICE A DAY 60 tablet 5   • diclofenac (VOLTAREN) 75 MG EC tablet Take 1 tablet by mouth 2 (Two) Times a Day As Needed (Pain) for up to 20 doses. 20 tablet 0   • insulin NPH (humuLIN N,novoLIN N) 100 UNIT/ML injection Inject 32 Units under the skin Every Night.     • insulin regular (humuLIN R,novoLIN R) 100 UNIT/ML injection Inject 15 Units under the skin 3 (Three) Times a Day Before Meals. 15-20 units at breakfast   15-20 units at lunch   25-45 units at dinner     • lisinopril (PRINIVIL,ZESTRIL) 10 MG tablet TAKE ONE TABLET BY MOUTH TWICE A DAY 60 tablet 3   • melatonin 5 MG tablet tablet Take 5 mg by mouth.     • metaxalone (SKELAXIN) 800 MG tablet Take 1 tablet by mouth 4 (Four) Times a Day As Needed for Muscle Spasms for up to 24 doses. 24 tablet 0   • pantoprazole (PROTONIX) 40 MG EC tablet TAKE ONE TABLET BY MOUTH DAILY 30 tablet 3   • traMADol (ULTRAM) 50 MG tablet 1 - 2 tablets po q 4 hours PRN sever pain 24 tablet 0     No facility-administered medications prior to visit.      No orders of the defined types were placed in this encounter.    There are no discontinued medications.      Return in about 3 months (around 11/4/2017) for Recheck, labs.

## 2017-08-08 LAB
C TRACH RRNA CVX QL NAA+PROBE: NEGATIVE
CONV .: NORMAL
CYTOLOGIST CVX/VAG CYTO: NORMAL
CYTOLOGY CVX/VAG DOC THIN PREP: NORMAL
DX ICD CODE: NORMAL
HIV 1 & 2 AB SER-IMP: NORMAL
N GONORRHOEA RRNA CVX QL NAA+PROBE: NEGATIVE
OTHER STN SPEC: NORMAL
PATH REPORT.FINAL DX SPEC: NORMAL
STAT OF ADQ CVX/VAG CYTO-IMP: NORMAL
T VAGINALIS RRNA SPEC QL NAA+PROBE: NEGATIVE

## 2017-08-09 ENCOUNTER — RESULTS ENCOUNTER (OUTPATIENT)
Dept: INTERNAL MEDICINE | Facility: CLINIC | Age: 57
End: 2017-08-09

## 2017-08-09 DIAGNOSIS — I25.10 CHRONIC CORONARY ARTERY DISEASE: ICD-10-CM

## 2017-08-09 DIAGNOSIS — E78.5 HYPERLIPIDEMIA, UNSPECIFIED HYPERLIPIDEMIA TYPE: ICD-10-CM

## 2017-08-09 DIAGNOSIS — E13.9 LATENT AUTOIMMUNE DIABETES IN ADULTS (LADA), MANAGED AS TYPE 1 (HCC): ICD-10-CM

## 2017-08-09 DIAGNOSIS — I10 ESSENTIAL HYPERTENSION: ICD-10-CM

## 2017-08-09 DIAGNOSIS — Z00.00 HEALTHCARE MAINTENANCE: ICD-10-CM

## 2017-08-11 ENCOUNTER — TELEPHONE (OUTPATIENT)
Dept: INTERNAL MEDICINE | Facility: CLINIC | Age: 57
End: 2017-08-11

## 2017-08-11 NOTE — TELEPHONE ENCOUNTER
Patient has been advised and voiced understanding.     ----- Message from Aura Duckworth MD sent at 8/10/2017  9:08 AM EDT -----  Call pt about labs.  Pap negative.  Recheck in 3 years

## 2017-08-25 ENCOUNTER — TELEPHONE (OUTPATIENT)
Dept: CARDIOLOGY | Facility: CLINIC | Age: 57
End: 2017-08-25

## 2017-09-25 RX ORDER — PANTOPRAZOLE SODIUM 40 MG/1
TABLET, DELAYED RELEASE ORAL
Qty: 30 TABLET | Refills: 0 | Status: SHIPPED | OUTPATIENT
Start: 2017-09-25 | End: 2017-12-19 | Stop reason: SDUPTHER

## 2017-10-02 ENCOUNTER — OFFICE VISIT (OUTPATIENT)
Dept: CARDIOLOGY | Facility: CLINIC | Age: 57
End: 2017-10-02

## 2017-10-02 VITALS
HEART RATE: 67 BPM | WEIGHT: 204 LBS | DIASTOLIC BLOOD PRESSURE: 70 MMHG | BODY MASS INDEX: 30.92 KG/M2 | SYSTOLIC BLOOD PRESSURE: 110 MMHG | HEIGHT: 68 IN

## 2017-10-02 DIAGNOSIS — I10 ESSENTIAL HYPERTENSION: ICD-10-CM

## 2017-10-02 DIAGNOSIS — E78.5 HYPERLIPIDEMIA, UNSPECIFIED HYPERLIPIDEMIA TYPE: ICD-10-CM

## 2017-10-02 DIAGNOSIS — E11.59 TYPE 2 DIABETES MELLITUS WITH OTHER CIRCULATORY COMPLICATION, WITH LONG-TERM CURRENT USE OF INSULIN (HCC): ICD-10-CM

## 2017-10-02 DIAGNOSIS — Z79.4 TYPE 2 DIABETES MELLITUS WITH OTHER CIRCULATORY COMPLICATION, WITH LONG-TERM CURRENT USE OF INSULIN (HCC): ICD-10-CM

## 2017-10-02 DIAGNOSIS — I25.10 CHRONIC CORONARY ARTERY DISEASE: Primary | ICD-10-CM

## 2017-10-02 PROCEDURE — 93000 ELECTROCARDIOGRAM COMPLETE: CPT | Performed by: INTERNAL MEDICINE

## 2017-10-02 PROCEDURE — 99214 OFFICE O/P EST MOD 30 MIN: CPT | Performed by: INTERNAL MEDICINE

## 2017-10-02 NOTE — PROGRESS NOTES
Date of Office Visit: 10/02/2017  Encounter Provider: Nargis Blackwood MD  Place of Service: [facility]  Patient Name: Kristine Rivas  :1960      Patient ID:  [id number] Rivas is a 57 y.o. female is here for  followup for CAD.         History of Present Illness    The patient was first seen at [facility] on 2014, with a  non-ST-elevation myocardial infarction. She was having severe chest pressure going into  her neck. She has a known history of hypertension, hyperlipidemia, and diabetes mellitus  type 2, but she was very limited in the medication that she could get, so she was only  buying her insulin at the time when all this happened. 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 She takes  care of her elderly mother and her children and finances are very tight. When the chest  pressure would not go away, her neighbor advised her to go to the emergency department  there. She ruled in for a myocardial infarction. She was smoking a pack of cigarettes a  day when she first came to the hospital. She was then transferred semi-emergently to  [facility] where she underwent a cardiac catheterization. This was done on  2014, and showed an ejection fraction of 60%, 50% distal left main stenosis, 95% mid  left anterior descending stenosis, 80% diagonal stenosis, 90% diffuse circumflex stenosis,  50% right coronary artery stenosis followed by a distal 90% with thrombus. The posterior  descending artery was occluded. She went on to have coronary artery bypass grafting done  on 2014, receiving a left internal mammary artery to left anterior descending,  saphenous vein graft sequential to the first diagonal and then to the second diagonal,  saphenous vein graft to obtuse marginal, saphenous vein graft to posterior descending  artery, and saphenous vein graft to posterior left ventricle sequential then to the obtuse  marginal two. Postoperatively, she did  well.      She had a stress nuclear  perfusion study in 2015 for chest pain and that was normal showing no evidence of  ischemia.          She was in the emergency department on 2016 with exertional chest pressure and difficulty breathing. She ruled out for a myocardial infarction. Her ECG was unremarkable.           Her mother passed away peacefully in 2017.  She was her caregiver.  She said it was a blessing to watch her pass peacefully.  She is now back to work at [employer] and enjoying her job as an having some right foot pain due to bone spurs.  She's had no tachycardia or exertional chest pain.  She gets occasional sharp shooting left-sided chest pain but she thinks is related to scar tissue in her chest from bypass.  She's had no tachycardia, dizziness or syncope.  She is taking her medications as directed.      Past Medical History:   Diagnosis Date   • AC (acromioclavicular) joint bone spurs    • Diabetes mellitus    • Herpes zoster    • Hyperlipidemia    • Hypertension    • Myocardial infarction          Past Surgical History:   Procedure Laterality Date   • BREAST LUMPECTOMY     • BREAST LUMPECTOMY      for benign lump   • CARDIAC SURGERY  2014   •  SECTION     • CORONARY ARTERY BYPASS GRAFT     • KNEE SURGERY         Current Outpatient Prescriptions on File Prior to Visit   Medication Sig Dispense Refill   • acetaminophen (TYLENOL) 500 MG tablet Take 1,000 mg by mouth 2 (Two) Times a Day.     • aspirin 81 MG tablet Take 1 tablet by mouth daily.     • atorvastatin (LIPITOR) 40 MG tablet TAKE ONE TABLET BY MOUTH DAILY 90 tablet 0   • carvedilol (COREG) 3.125 MG tablet TAKE ONE TABLET BY MOUTH TWICE A DAY 60 tablet 5   • diclofenac (VOLTAREN) 75 MG EC tablet Take 1 tablet by mouth 2 (Two) Times a Day As Needed (Pain) for up to 20 doses. 20 tablet 0   • insulin NPH (humuLIN N,novoLIN N) 100 UNIT/ML injection Inject 32 Units under the skin Every Night.     • insulin regular  (humuLIN R,novoLIN R) 100 UNIT/ML injection Inject 15 Units under the skin 3 (Three) Times a Day Before Meals. 15-20 units at breakfast   15-20 units at lunch   25-45 units at dinner     • lisinopril (PRINIVIL,ZESTRIL) 10 MG tablet TAKE ONE TABLET BY MOUTH TWICE A DAY 60 tablet 3   • melatonin 5 MG tablet tablet Take 5 mg by mouth.     • metaxalone (SKELAXIN) 800 MG tablet Take 1 tablet by mouth 4 (Four) Times a Day As Needed for Muscle Spasms for up to 24 doses. 24 tablet 0   • pantoprazole (PROTONIX) 40 MG EC tablet TAKE ONE TABLET BY MOUTH DAILY 30 tablet 0   • traMADol (ULTRAM) 50 MG tablet 1 - 2 tablets po q 4 hours PRN sever pain 24 tablet 0     No current facility-administered medications on file prior to visit.        Social History     Social History   • Marital status: Legally      Spouse name: N/A   • Number of children: N/A   • Years of education: N/A     Occupational History   • Not on file.     Social History Main Topics   • Smoking status: Former Smoker     Packs/day: 1.00     Years: 15.00     Quit date: 7/9/2014   • Smokeless tobacco: Never Used   • Alcohol use Yes   • Drug use: No   • Sexual activity: Not on file     Other Topics Concern   • Not on file     Social History Narrative           Review of Systems   Constitution: Negative.   HENT: Negative for congestion.    Eyes: Negative for vision loss in left eye and vision loss in right eye.   Respiratory: Negative.  Negative for cough, hemoptysis, shortness of breath, sleep disturbances due to breathing, snoring, sputum production and wheezing.    Endocrine: Negative.    Hematologic/Lymphatic: Negative.    Skin: Negative for poor wound healing and rash.   Musculoskeletal: Positive for joint pain and joint swelling. Negative for falls, gout, muscle cramps and myalgias.   Gastrointestinal: Negative for abdominal pain, diarrhea, dysphagia, hematemesis, melena, nausea and vomiting.   Neurological: Negative for excessive daytime sleepiness,  "dizziness, headaches, light-headedness, loss of balance, seizures and vertigo.   Psychiatric/Behavioral: Negative for depression and substance abuse. The patient is not nervous/anxious.        Procedures    ECG 12 Lead  Date/Time: 10/2/2017 11:14 AM  Performed by: COOPER WALTON  Authorized by: COOPER WALTON   Comparison: compared with previous ECG   Similar to previous ECG  Rhythm: sinus rhythm  Clinical impression: normal ECG               Objective:      Vitals:    10/02/17 1055   BP: 110/70   BP Location: Right arm   Patient Position: Sitting   Pulse: 67   Weight: 204 lb (92.5 kg)   Height: 68\" (172.7 cm)     Body mass index is 31.02 kg/(m^2).    Physical Exam   Constitutional: She is oriented to person, place, and time. She appears well-developed and well-nourished. No distress.   HENT:   Head: Normocephalic and atraumatic.   Eyes: Conjunctivae are normal. No scleral icterus.   Neck: Neck supple. No JVD present. Carotid bruit is not present. No thyromegaly present.   Cardiovascular: Normal rate, regular rhythm, S1 normal, S2 normal, normal heart sounds and intact distal pulses.   No extrasystoles are present. PMI is not displaced.  Exam reveals no gallop.    No murmur heard.  Pulses:       Carotid pulses are 2+ on the right side, and 2+ on the left side.       Radial pulses are 2+ on the right side, and 2+ on the left side.        Dorsalis pedis pulses are 2+ on the right side, and 2+ on the left side.        Posterior tibial pulses are 2+ on the right side, and 2+ on the left side.   Pulmonary/Chest: Effort normal and breath sounds normal. No respiratory distress. She has no wheezes. She has no rhonchi. She has no rales. She exhibits no tenderness.   Abdominal: Soft. Bowel sounds are normal. She exhibits no distension, no abdominal bruit and no mass. There is no tenderness.   Musculoskeletal: She exhibits no edema or deformity.   Lymphadenopathy:     She has no cervical adenopathy.   Neurological: " She is alert and oriented to person, place, and time. No cranial nerve deficit.   Skin: Skin is warm and dry. No rash noted. She is not diaphoretic. No cyanosis. No pallor. Nails show no clubbing.   Psychiatric: She has a normal mood and affect. Judgment normal.   Vitals reviewed.      Lab Review:       Assessment:      Diagnosis Plan   1. Chronic coronary artery disease     2. Essential hypertension     3. Hyperlipidemia, unspecified hyperlipidemia type     4. Type 2 diabetes mellitus with other circulatory complication, with long-term current use of insulin       1. Coronary artery disease with non-ST elevation myocardial infarction in 07/2014 status  post coronary artery bypass grafting with grafts as noted above. She has her anginal  equivalent, normal stress nuclear at E 4/2015. She went to the ED 6/22/16 for exertional chest pressure. She had a normal w/u and a normal Lexiscan nuclear stress study 7/2016. She has no angina or CHF.   2. Normal ejection fraction at 60%.   3. Diabetes mellitus type 2, insulin dependent. Sees Dr. Duckworth.   4. Hypertension. BP is well controlled.   5. Hyperlipidemia. On atorvastatin.  She does have leg aching.   6. History of smoking. She is not smoking any more.      Plan:       See back in 1 year, no med changes.    Coronary Artery Disease  Assessment  • The patient has no angina    Subjective - Objective  • There is a history of previous coronary artery bypass graft  • Current antiplatelet therapy includes aspirin 81 mg

## 2017-10-30 RX ORDER — LISINOPRIL 10 MG/1
TABLET ORAL
Qty: 60 TABLET | Refills: 11 | Status: SHIPPED | OUTPATIENT
Start: 2017-10-30 | End: 2018-09-13 | Stop reason: SDUPTHER

## 2017-10-31 RX ORDER — ATORVASTATIN CALCIUM 40 MG/1
40 TABLET, FILM COATED ORAL NIGHTLY
Qty: 90 TABLET | Refills: 1 | Status: SHIPPED | OUTPATIENT
Start: 2017-10-31 | End: 2018-05-09 | Stop reason: SDUPTHER

## 2017-11-14 ENCOUNTER — APPOINTMENT (OUTPATIENT)
Dept: CT IMAGING | Facility: HOSPITAL | Age: 57
End: 2017-11-14

## 2017-11-14 ENCOUNTER — HOSPITAL ENCOUNTER (EMERGENCY)
Facility: HOSPITAL | Age: 57
Discharge: HOME | End: 2017-11-14
Attending: EMERGENCY MEDICINE | Admitting: EMERGENCY MEDICINE

## 2017-11-14 VITALS
BODY MASS INDEX: 30.31 KG/M2 | HEIGHT: 68 IN | TEMPERATURE: 97.7 F | RESPIRATION RATE: 20 BRPM | WEIGHT: 200 LBS | OXYGEN SATURATION: 97 % | DIASTOLIC BLOOD PRESSURE: 76 MMHG | HEART RATE: 61 BPM | SYSTOLIC BLOOD PRESSURE: 145 MMHG

## 2017-11-14 DIAGNOSIS — H81.02 MENIERE'S DISEASE OF LEFT EAR: ICD-10-CM

## 2017-11-14 DIAGNOSIS — R42 VERTIGO: Primary | ICD-10-CM

## 2017-11-14 PROCEDURE — 99283 EMERGENCY DEPT VISIT LOW MDM: CPT

## 2017-11-14 PROCEDURE — 99284 EMERGENCY DEPT VISIT MOD MDM: CPT | Performed by: EMERGENCY MEDICINE

## 2017-11-14 PROCEDURE — 70450 CT HEAD/BRAIN W/O DYE: CPT

## 2017-11-14 RX ORDER — MECLIZINE HYDROCHLORIDE 25 MG/1
25 TABLET ORAL ONCE
Status: COMPLETED | OUTPATIENT
Start: 2017-11-14 | End: 2017-11-14

## 2017-11-14 RX ORDER — ONDANSETRON 4 MG/1
4 TABLET, ORALLY DISINTEGRATING ORAL ONCE
Status: COMPLETED | OUTPATIENT
Start: 2017-11-14 | End: 2017-11-14

## 2017-11-14 RX ORDER — MECLIZINE HYDROCHLORIDE 25 MG/1
25 TABLET ORAL 3 TIMES DAILY PRN
Qty: 30 TABLET | Refills: 0 | Status: SHIPPED | OUTPATIENT
Start: 2017-11-14 | End: 2019-07-21

## 2017-11-14 RX ORDER — ONDANSETRON 4 MG/1
4 TABLET, ORALLY DISINTEGRATING ORAL EVERY 6 HOURS PRN
Qty: 30 TABLET | Refills: 0 | Status: SHIPPED | OUTPATIENT
Start: 2017-11-14 | End: 2019-04-23

## 2017-11-14 RX ORDER — CETIRIZINE HYDROCHLORIDE 10 MG/1
10 TABLET ORAL DAILY
Qty: 14 TABLET | Refills: 0 | Status: SHIPPED | OUTPATIENT
Start: 2017-11-14 | End: 2017-11-28

## 2017-11-14 RX ORDER — SCOLOPAMINE TRANSDERMAL SYSTEM 1 MG/1
1 PATCH, EXTENDED RELEASE TRANSDERMAL
Qty: 4 PATCH | Refills: 0 | Status: SHIPPED | OUTPATIENT
Start: 2017-11-14 | End: 2019-04-23

## 2017-11-14 RX ADMIN — MECLIZINE HYDROCHLORIDE 25 MG: 25 TABLET ORAL at 18:35

## 2017-11-14 RX ADMIN — ONDANSETRON 4 MG: 4 TABLET, ORALLY DISINTEGRATING ORAL at 21:11

## 2017-11-14 RX ADMIN — ONDANSETRON 4 MG: 4 TABLET, ORALLY DISINTEGRATING ORAL at 18:35

## 2017-11-14 NOTE — ED PROVIDER NOTES
Subjective   Patient is a 57 y.o. female presenting with dizziness.   History provided by:  Patient  Dizziness   Quality:  Vertigo  Severity:  Moderate  Onset quality:  Gradual  Duration:  1 day  Timing:  Intermittent  Progression:  Worsening  Chronicity:  New  Context comment:  As described below.  Relieved by:  Nothing  Exacerbated by: movement of head, movement of eyes.  Ineffective treatments:  Being still  Associated symptoms: nausea    Associated symptoms: no chest pain, no diarrhea, no headaches, no hearing loss, no palpitations, no shortness of breath, no syncope, no tinnitus, no vision changes, no vomiting and no weakness      HPI Narrative:Ms. Rivas is a 57 yoWF who presents secondary to dizziness.  Patient reports chest today she had pain behind her right ear.  This was followed by a fullness sensation in bilateral ears and neck.  Today patient has noticed dizziness when she rotates her head side to side.  Patient has 1 prior history of vertigo.  Patient reports she required a total of 5 medications to control the vertigo.  Symptoms resolved within 1 week.  Patient did not see an ENT during or after this episode.  Patient presents for evaluation.    She is accompanied by her sister.    Patient is employed at [employer].  No specific sick contacts however countless possible exposures.        Review of Systems   Constitutional: Negative.  Negative for appetite change, diaphoresis and fever.   HENT: Negative.  Negative for hearing loss and tinnitus.    Eyes: Negative.    Respiratory: Negative for cough, chest tightness, shortness of breath and wheezing.    Cardiovascular: Negative for chest pain, palpitations, leg swelling and syncope.   Gastrointestinal: Positive for nausea. Negative for diarrhea and vomiting.   Genitourinary: Negative.  Negative for difficulty urinating, flank pain, frequency and hematuria.   Musculoskeletal: Negative.    Skin: Negative.  Negative for color change, pallor and rash.    Neurological: Positive for dizziness. Negative for seizures, syncope, weakness and headaches.   Psychiatric/Behavioral: Negative.  Negative for agitation, behavioral problems and hallucinations.       Past Medical History:   Diagnosis Date   • AC (acromioclavicular) joint bone spurs    • Diabetes mellitus    • Herpes zoster    • Hyperlipidemia    • Hypertension    • Myocardial infarction        No Known Allergies    Past Surgical History:   Procedure Laterality Date   • BREAST LUMPECTOMY     • BREAST LUMPECTOMY      for benign lump   • CARDIAC SURGERY  2014   •  SECTION     • CORONARY ARTERY BYPASS GRAFT     • KNEE SURGERY         Family History   Problem Relation Age of Onset   • Hypertension Father    • Diabetes Father    • Glaucoma Father    • Hypotension Father    • Kidney failure Father    • Anxiety disorder Father    • Hypertension Sister    • Diabetes Sister    • Anxiety disorder Sister    • Osteoporosis Mother    • Cataracts Mother    • Heart failure Mother        Social History     Social History   • Marital status: Legally      Spouse name: N/A   • Number of children: N/A   • Years of education: N/A     Social History Main Topics   • Smoking status: Former Smoker     Packs/day: 1.00     Years: 15.00     Quit date: 2014   • Smokeless tobacco: Never Used   • Alcohol use Yes   • Drug use: No   • Sexual activity: Not Asked     Other Topics Concern   • None     Social History Narrative           Objective   Physical Exam   Constitutional: She is oriented to person, place, and time. She appears well-developed and well-nourished. No distress.   57-year-old white female laying in bed.  Patient keeps her head very still during history and physical.  Any significant rotation of her head causes immediate onset of vertigo.  Patient is accompanied by her sister.   HENT:   Head: Normocephalic and atraumatic.   Right Ear: External ear normal.   Left Ear: External ear normal.   Nose: Nose  normal.   Mouth/Throat: Oropharynx is clear and moist.   Eyes: Conjunctivae and EOM are normal. Pupils are equal, round, and reactive to light.   Neck: Normal range of motion. Neck supple.   Cardiovascular: Normal rate, regular rhythm, normal heart sounds and intact distal pulses.  Exam reveals no gallop and no friction rub.    No murmur heard.  Pulmonary/Chest: Effort normal and breath sounds normal.   Abdominal: Soft. Bowel sounds are normal. She exhibits no distension. There is no tenderness.   Musculoskeletal: Normal range of motion.   Neurological: She is alert and oriented to person, place, and time.   Skin: Skin is warm and dry. She is not diaphoretic.   Psychiatric: She has a normal mood and affect. Her behavior is normal.   Nursing note and vitals reviewed.      Procedures         ED Course  ED Course   Comment By Time   11/14/17  6:29 PM  With prior vertigo no head CT was performed.  Will obtain today.  Giving meclizine and Zofran. Jose Higuera MD 11/14 1830 11/14/17  8:56 PM  CT head is unremarkable.  Patient now complaining of a roaring sensation in her left ear.  This is consistent with Ménière's disease.  Recommend patient follow-up with ENT.  Will give additional antinausea medicine prior to discharge as transferring from stretcher to ER bed has caused her nausea to return.  Giving ENT for follow-up.  Will DC home. Jose Higuera MD 11/14 2056                  MDM  Number of Diagnoses or Management Options  Meniere's disease of left ear: new and requires workup  Vertigo: new and requires workup     Amount and/or Complexity of Data Reviewed  Tests in the radiology section of CPT®: ordered and reviewed    Risk of Complications, Morbidity, and/or Mortality  Presenting problems: moderate  Diagnostic procedures: moderate  Management options: moderate    Patient Progress  Patient progress: improved      Final diagnoses:   Vertigo   Meniere's disease of left ear            Jose Higuera  MD  11/14/17 2951

## 2017-11-15 NOTE — ED NOTES
Fullness feeling in bilateral ears. Dizziness that has been getting worse since Saturday. History of vertigo. Patient believes this is an episode of vertigo again. Spinning feeling occurs at any time whether laying down or not, comes and goes       Aniya Hester RN  11/14/17 2122

## 2017-11-15 NOTE — DISCHARGE INSTRUCTIONS
Medication as directed.  Call Dr. Reich's office tomorrow and arrange follow-up within the next week.  Sooner if needed.  Return to ED for worsening symptoms, medical emergencies.    Hypertension  Hypertension, commonly called high blood pressure, is when the force of blood pumping through your arteries is too strong. Your arteries are the blood vessels that carry blood from your heart throughout your body. A blood pressure reading consists of a higher number over a lower number, such as 110/72. The higher number (systolic) is the pressure inside your arteries when your heart pumps. The lower number (diastolic) is the pressure inside your arteries when your heart relaxes. Ideally you want your blood pressure below 120/80.  Hypertension forces your heart to work harder to pump blood. Your arteries may become narrow or stiff. Having untreated or uncontrolled hypertension can cause heart attack, stroke, kidney disease, and other problems.  RISK FACTORS  Some risk factors for high blood pressure are controllable. Others are not.   Risk factors you cannot control include:   · Race. You may be at higher risk if you are .  · Age. Risk increases with age.  · Gender. Men are at higher risk than women before age 45 years. After age 65, women are at higher risk than men.  Risk factors you can control include:  · Not getting enough exercise or physical activity.  · Being overweight.  · Getting too much fat, sugar, calories, or salt in your diet.  · Drinking too much alcohol.  SIGNS AND SYMPTOMS  Hypertension does not usually cause signs or symptoms. Extremely high blood pressure (hypertensive crisis) may cause headache, anxiety, shortness of breath, and nosebleed.  DIAGNOSIS  To check if you have hypertension, your health care provider will measure your blood pressure while you are seated, with your arm held at the level of your heart. It should be measured at least twice using the same arm. Certain conditions  can cause a difference in blood pressure between your right and left arms. A blood pressure reading that is higher than normal on one occasion does not mean that you need treatment. If it is not clear whether you have high blood pressure, you may be asked to return on a different day to have your blood pressure checked again. Or, you may be asked to monitor your blood pressure at home for 1 or more weeks.  TREATMENT  Treating high blood pressure includes making lifestyle changes and possibly taking medicine. Living a healthy lifestyle can help lower high blood pressure. You may need to change some of your habits.  Lifestyle changes may include:  · Following the DASH diet. This diet is high in fruits, vegetables, and whole grains. It is low in salt, red meat, and added sugars.  · Keep your sodium intake below 2,300 mg per day.  · Getting at least 30-45 minutes of aerobic exercise at least 4 times per week.  · Losing weight if necessary.  · Not smoking.  · Limiting alcoholic beverages.  · Learning ways to reduce stress.  Your health care provider may prescribe medicine if lifestyle changes are not enough to get your blood pressure under control, and if one of the following is true:  · You are 18-59 years of age and your systolic blood pressure is above 140.  · You are 60 years of age or older, and your systolic blood pressure is above 150.  · Your diastolic blood pressure is above 90.  · You have diabetes, and your systolic blood pressure is over 140 or your diastolic blood pressure is over 90.  · You have kidney disease and your blood pressure is above 140/90.  · You have heart disease and your blood pressure is above 140/90.  Your personal target blood pressure may vary depending on your medical conditions, your age, and other factors.  HOME CARE INSTRUCTIONS  · Have your blood pressure rechecked as directed by your health care provider.    · Take medicines only as directed by your health care provider. Follow the  directions carefully. Blood pressure medicines must be taken as prescribed. The medicine does not work as well when you skip doses. Skipping doses also puts you at risk for problems.  · Do not smoke.    · Monitor your blood pressure at home as directed by your health care provider.   SEEK MEDICAL CARE IF:   · You think you are having a reaction to medicines taken.  · You have recurrent headaches or feel dizzy.  · You have swelling in your ankles.  · You have trouble with your vision.  SEEK IMMEDIATE MEDICAL CARE IF:  · You develop a severe headache or confusion.  · You have unusual weakness, numbness, or feel faint.  · You have severe chest or abdominal pain.  · You vomit repeatedly.  · You have trouble breathing.  MAKE SURE YOU:   · Understand these instructions.  · Will watch your condition.  · Will get help right away if you are not doing well or get worse.     This information is not intended to replace advice given to you by your health care provider. Make sure you discuss any questions you have with your health care provider.     Document Released: 12/18/2006 Document Revised: 05/03/2016 Document Reviewed: 10/10/2014  [employer] Patient Education ©2017 [employer]

## 2017-11-15 NOTE — ED NOTES
Went over discharge instructions with patient, alert and oriented at time of discharge, no questions at this time. Left via wheelchair       Aniya Hester RN  11/14/17 2117       Aniya Hester RN  11/14/17 2118

## 2017-12-02 ENCOUNTER — APPOINTMENT (OUTPATIENT)
Dept: GENERAL RADIOLOGY | Facility: HOSPITAL | Age: 57
End: 2017-12-02

## 2017-12-02 ENCOUNTER — HOSPITAL ENCOUNTER (EMERGENCY)
Facility: HOSPITAL | Age: 57
Discharge: HOME | End: 2017-12-02
Attending: EMERGENCY MEDICINE | Admitting: EMERGENCY MEDICINE

## 2017-12-02 VITALS
HEIGHT: 68 IN | RESPIRATION RATE: 20 BRPM | WEIGHT: 198.41 LBS | DIASTOLIC BLOOD PRESSURE: 82 MMHG | TEMPERATURE: 98 F | OXYGEN SATURATION: 97 % | HEART RATE: 60 BPM | SYSTOLIC BLOOD PRESSURE: 166 MMHG | BODY MASS INDEX: 30.07 KG/M2

## 2017-12-02 DIAGNOSIS — S93.602A FOOT SPRAIN, LEFT, INITIAL ENCOUNTER: Primary | ICD-10-CM

## 2017-12-02 PROCEDURE — 99283 EMERGENCY DEPT VISIT LOW MDM: CPT

## 2017-12-02 PROCEDURE — 73630 X-RAY EXAM OF FOOT: CPT

## 2017-12-02 PROCEDURE — 99282 EMERGENCY DEPT VISIT SF MDM: CPT | Performed by: EMERGENCY MEDICINE

## 2017-12-02 RX ORDER — MELOXICAM 7.5 MG/1
7.5 TABLET ORAL DAILY
Qty: 14 TABLET | Refills: 0 | Status: SHIPPED | OUTPATIENT
Start: 2017-12-02 | End: 2017-12-16

## 2017-12-03 NOTE — DISCHARGE INSTRUCTIONS
Medications directed.  Wear a postop shoe as needed for the next week.  Elevate and ice. Follow-up with Dr. Remy as above.  Return to ED for medical emergencies.    Hypertension  Hypertension, commonly called high blood pressure, is when the force of blood pumping through your arteries is too strong. Your arteries are the blood vessels that carry blood from your heart throughout your body. A blood pressure reading consists of a higher number over a lower number, such as 110/72. The higher number (systolic) is the pressure inside your arteries when your heart pumps. The lower number (diastolic) is the pressure inside your arteries when your heart relaxes. Ideally you want your blood pressure below 120/80.  Hypertension forces your heart to work harder to pump blood. Your arteries may become narrow or stiff. Having untreated or uncontrolled hypertension can cause heart attack, stroke, kidney disease, and other problems.  RISK FACTORS  Some risk factors for high blood pressure are controllable. Others are not.   Risk factors you cannot control include:   · Race. You may be at higher risk if you are .  · Age. Risk increases with age.  · Gender. Men are at higher risk than women before age 45 years. After age 65, women are at higher risk than men.  Risk factors you can control include:  · Not getting enough exercise or physical activity.  · Being overweight.  · Getting too much fat, sugar, calories, or salt in your diet.  · Drinking too much alcohol.  SIGNS AND SYMPTOMS  Hypertension does not usually cause signs or symptoms. Extremely high blood pressure (hypertensive crisis) may cause headache, anxiety, shortness of breath, and nosebleed.  DIAGNOSIS  To check if you have hypertension, your health care provider will measure your blood pressure while you are seated, with your arm held at the level of your heart. It should be measured at least twice using the same arm. Certain conditions can cause a  difference in blood pressure between your right and left arms. A blood pressure reading that is higher than normal on one occasion does not mean that you need treatment. If it is not clear whether you have high blood pressure, you may be asked to return on a different day to have your blood pressure checked again. Or, you may be asked to monitor your blood pressure at home for 1 or more weeks.  TREATMENT  Treating high blood pressure includes making lifestyle changes and possibly taking medicine. Living a healthy lifestyle can help lower high blood pressure. You may need to change some of your habits.  Lifestyle changes may include:  · Following the DASH diet. This diet is high in fruits, vegetables, and whole grains. It is low in salt, red meat, and added sugars.  · Keep your sodium intake below 2,300 mg per day.  · Getting at least 30-45 minutes of aerobic exercise at least 4 times per week.  · Losing weight if necessary.  · Not smoking.  · Limiting alcoholic beverages.  · Learning ways to reduce stress.  Your health care provider may prescribe medicine if lifestyle changes are not enough to get your blood pressure under control, and if one of the following is true:  · You are 18-59 years of age and your systolic blood pressure is above 140.  · You are 60 years of age or older, and your systolic blood pressure is above 150.  · Your diastolic blood pressure is above 90.  · You have diabetes, and your systolic blood pressure is over 140 or your diastolic blood pressure is over 90.  · You have kidney disease and your blood pressure is above 140/90.  · You have heart disease and your blood pressure is above 140/90.  Your personal target blood pressure may vary depending on your medical conditions, your age, and other factors.  HOME CARE INSTRUCTIONS  · Have your blood pressure rechecked as directed by your health care provider.    · Take medicines only as directed by your health care provider. Follow the directions  carefully. Blood pressure medicines must be taken as prescribed. The medicine does not work as well when you skip doses. Skipping doses also puts you at risk for problems.  · Do not smoke.    · Monitor your blood pressure at home as directed by your health care provider.   SEEK MEDICAL CARE IF:   · You think you are having a reaction to medicines taken.  · You have recurrent headaches or feel dizzy.  · You have swelling in your ankles.  · You have trouble with your vision.  SEEK IMMEDIATE MEDICAL CARE IF:  · You develop a severe headache or confusion.  · You have unusual weakness, numbness, or feel faint.  · You have severe chest or abdominal pain.  · You vomit repeatedly.  · You have trouble breathing.  MAKE SURE YOU:   · Understand these instructions.  · Will watch your condition.  · Will get help right away if you are not doing well or get worse.     This information is not intended to replace advice given to you by your health care provider. Make sure you discuss any questions you have with your health care provider.     Document Released: 12/18/2006 Document Revised: 05/03/2016 Document Reviewed: 10/10/2014  [employer] Interactive Patient Education ©2017 [employer]

## 2017-12-03 NOTE — ED PROVIDER NOTES
Subjective   Patient is a 57 y.o. female presenting with lower extremity pain.   History provided by:  Patient  Lower Extremity Issue   Location:  Foot  Injury: no    Foot location:  L foot  Pain details:     Quality:  Shooting    Radiates to: lateral aspect of left ankle.    Severity:  Moderate    Timing:  Intermittent  Chronicity:  New  Dislocation: no    Foreign body present:  No foreign bodies  Relieved by:  Rest and elevation  Worsened by:  Bearing weight (and palpation)  Associated symptoms: swelling (slight)    Associated symptoms: no back pain, no decreased ROM, no fever, no muscle weakness, no numbness, no stiffness and no tingling    Risk factors: no concern for non-accidental trauma, no frequent fractures and no known bone disorder      HPI Narrative:Ms. Rivas is a 58 yo WF who presents secondary to left foot pain ×3 days.  No known injury.  Onset of pain 2 days ago.  It is worsened with standing and walking.  Worsened with palpation.  Patient works at [employer].  She is on her feet for 12 hours today.  On concrete.  Patient presents for evaluation.        Review of Systems   Constitutional: Negative.  Negative for appetite change, diaphoresis and fever.   HENT: Negative.    Eyes: Negative.    Respiratory: Negative for cough, chest tightness, shortness of breath and wheezing.    Cardiovascular: Negative for chest pain, palpitations and leg swelling.   Genitourinary: Negative.  Negative for difficulty urinating, flank pain, frequency and hematuria.   Musculoskeletal: Negative.  Negative for back pain and stiffness.   Skin: Negative.  Negative for color change, pallor and rash.   Neurological: Negative.  Negative for dizziness, seizures, syncope and headaches.   Psychiatric/Behavioral: Negative.  Negative for agitation, behavioral problems and hallucinations.       Past Medical History:   Diagnosis Date   • AC (acromioclavicular) joint bone spurs    • Diabetes mellitus    • Herpes zoster    •  Hyperlipidemia    • Hypertension    • Myocardial infarction        No Known Allergies    Past Surgical History:   Procedure Laterality Date   • BREAST LUMPECTOMY     • BREAST LUMPECTOMY      for benign lump   • CARDIAC SURGERY  2014   •  SECTION     • CORONARY ARTERY BYPASS GRAFT     • KNEE SURGERY         Family History   Problem Relation Age of Onset   • Hypertension Father    • Diabetes Father    • Glaucoma Father    • Hypotension Father    • Kidney failure Father    • Anxiety disorder Father    • Hypertension Sister    • Diabetes Sister    • Anxiety disorder Sister    • Osteoporosis Mother    • Cataracts Mother    • Heart failure Mother        Social History     Social History   • Marital status: Legally      Spouse name: N/A   • Number of children: N/A   • Years of education: N/A     Social History Main Topics   • Smoking status: Former Smoker     Packs/day: 1.00     Years: 15.00     Quit date: 2014   • Smokeless tobacco: Never Used   • Alcohol use Yes   • Drug use: No   • Sexual activity: Not Asked     Other Topics Concern   • None     Social History Narrative           Objective   Physical Exam   Constitutional: She is oriented to person, place, and time. She appears well-developed and well-nourished. No distress.   57-year-old white female laying in bed.  Patient appears in good overall health.  She does not appear in any distress.   Musculoskeletal:        Left foot: There is tenderness, bony tenderness and swelling. There is normal capillary refill, no crepitus and no deformity.        Neurological: She is alert and oriented to person, place, and time.   Skin: Skin is warm and dry. She is not diaphoretic.   Psychiatric: She has a normal mood and affect. Her behavior is normal.   Nursing note and vitals reviewed.      Procedures         ED Course  ED Course   Comment By Time   17  7:01 PM  No know injury per pt.  However she is on her feet for 12 hours a day at work.   Woodson jaziel.  Slight swelling of lateral aspect left foot. Moderate tenderness to palpation especially distal aspect of fifth metatarsal.  No pain on movement of toes.  No erythema.  No evidence of cellulitis or findings consistent with gout.  Will obtain x-rays. Jose Higuera MD 12/02 1901 12/02/17  7:49 PM  Patient's x-rays are unremarkable.  Will treat as foot sprain.  Placing patient in postop shoe.  NSAIDs for home.  Patient has crutches at home.  Recommend follow-up with Dr. Sher Remy podiatry.  patient has seen Dr. Remy  before for other podiatry issues. Jose Higuera MD 12/02 1940                  MDM  Number of Diagnoses or Management Options  Foot sprain, left, initial encounter: new and requires workup     Amount and/or Complexity of Data Reviewed  Tests in the radiology section of CPT®: reviewed and ordered  Independent visualization of images, tracings, or specimens: yes    Risk of Complications, Morbidity, and/or Mortality  Presenting problems: low  Diagnostic procedures: low  Management options: low    Patient Progress  Patient progress: improved      Final diagnoses:   Foot sprain, left, initial encounter            Jose Higuera MD  12/02/17 3180

## 2017-12-04 DIAGNOSIS — M79.672 PAIN OF LEFT FOOT: Primary | ICD-10-CM

## 2017-12-12 ENCOUNTER — HOSPITAL ENCOUNTER (OUTPATIENT)
Dept: MRI IMAGING | Facility: HOSPITAL | Age: 57
Discharge: HOME | End: 2017-12-12
Attending: PODIATRIST | Admitting: PODIATRIST

## 2017-12-12 DIAGNOSIS — M79.672 PAIN OF LEFT FOOT: ICD-10-CM

## 2017-12-12 PROCEDURE — 73718 MRI LOWER EXTREMITY W/O DYE: CPT

## 2017-12-19 RX ORDER — PANTOPRAZOLE SODIUM 40 MG/1
TABLET, DELAYED RELEASE ORAL
Qty: 30 TABLET | Refills: 5 | Status: SHIPPED | OUTPATIENT
Start: 2017-12-19 | End: 2018-07-09 | Stop reason: SDUPTHER

## 2018-01-08 RX ORDER — CARVEDILOL 3.12 MG/1
TABLET ORAL
Qty: 180 TABLET | Refills: 1 | Status: SHIPPED | OUTPATIENT
Start: 2018-01-08 | End: 2018-01-09 | Stop reason: SDUPTHER

## 2018-01-09 RX ORDER — CARVEDILOL 3.12 MG/1
TABLET ORAL
Qty: 180 TABLET | Refills: 1 | Status: SHIPPED | OUTPATIENT
Start: 2018-01-09 | End: 2018-09-13 | Stop reason: SDUPTHER

## 2018-01-11 DIAGNOSIS — M84.375D STRESS FRACTURE OF LEFT FOOT WITH ROUTINE HEALING: Primary | ICD-10-CM

## 2018-01-26 ENCOUNTER — DOCUMENTATION (OUTPATIENT)
Dept: INTERNAL MEDICINE | Facility: CLINIC | Age: 58
End: 2018-01-26

## 2018-01-26 RX ORDER — OSELTAMIVIR PHOSPHATE 75 MG/1
75 CAPSULE ORAL DAILY
Qty: 10 CAPSULE | Refills: 0 | Status: SHIPPED | OUTPATIENT
Start: 2018-01-26 | End: 2018-02-05

## 2018-01-26 RX ORDER — OSELTAMIVIR PHOSPHATE 75 MG/1
75 CAPSULE ORAL DAILY
Qty: 10 CAPSULE | Refills: 0 | Status: SHIPPED | OUTPATIENT
Start: 2018-01-26 | End: 2018-01-26 | Stop reason: SDUPTHER

## 2018-02-10 ENCOUNTER — HOSPITAL ENCOUNTER (EMERGENCY)
Facility: HOSPITAL | Age: 58
Discharge: HOME | End: 2018-02-10
Attending: EMERGENCY MEDICINE | Admitting: EMERGENCY MEDICINE

## 2018-02-10 ENCOUNTER — APPOINTMENT (OUTPATIENT)
Dept: GENERAL RADIOLOGY | Facility: HOSPITAL | Age: 58
End: 2018-02-10

## 2018-02-10 VITALS
WEIGHT: 200 LBS | HEIGHT: 68 IN | BODY MASS INDEX: 30.31 KG/M2 | DIASTOLIC BLOOD PRESSURE: 84 MMHG | HEART RATE: 69 BPM | RESPIRATION RATE: 18 BRPM | SYSTOLIC BLOOD PRESSURE: 143 MMHG | OXYGEN SATURATION: 97 % | TEMPERATURE: 98 F

## 2018-02-10 DIAGNOSIS — R03.0 ELEVATED BLOOD PRESSURE READING: ICD-10-CM
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
## 2018-02-10 DIAGNOSIS — S20.221A CONTUSION OF RIGHT SIDE OF BACK, INITIAL ENCOUNTER: ICD-10-CM

## 2018-02-10 DIAGNOSIS — S39.012A STRAIN OF LUMBAR REGION, INITIAL ENCOUNTER: ICD-10-CM

## 2018-02-10 LAB
BILIRUB UR QL STRIP: NEGATIVE
CLARITY UR: CLEAR
COLOR UR: YELLOW
GLUCOSE UR STRIP-MCNC: ABNORMAL MG/DL
HGB UR QL STRIP.AUTO: NEGATIVE
KETONES UR QL STRIP: NEGATIVE
LEUKOCYTE ESTERASE UR QL STRIP.AUTO: NEGATIVE
NITRITE UR QL STRIP: NEGATIVE
PH UR STRIP.AUTO: 5.5 [PH] (ref 4.5–8)
PROT UR QL STRIP: NEGATIVE
SP GR UR STRIP: 1.02 (ref 1–1.03)
UROBILINOGEN UR QL STRIP: ABNORMAL

## 2018-02-10 PROCEDURE — 72100 X-RAY EXAM L-S SPINE 2/3 VWS: CPT

## 2018-02-10 PROCEDURE — 99282 EMERGENCY DEPT VISIT SF MDM: CPT | Performed by: EMERGENCY MEDICINE

## 2018-02-10 PROCEDURE — 99283 EMERGENCY DEPT VISIT LOW MDM: CPT

## 2018-02-10 PROCEDURE — 72170 X-RAY EXAM OF PELVIS: CPT

## 2018-02-10 PROCEDURE — 81003 URINALYSIS AUTO W/O SCOPE: CPT | Performed by: EMERGENCY MEDICINE

## 2018-02-10 RX ORDER — CYCLOBENZAPRINE HCL 10 MG
10 TABLET ORAL 3 TIMES DAILY PRN
Qty: 30 TABLET | Refills: 0 | Status: SHIPPED | OUTPATIENT
Start: 2018-02-10 | End: 2019-04-23

## 2018-02-10 RX ORDER — TRAMADOL HYDROCHLORIDE 50 MG/1
50 TABLET ORAL EVERY 6 HOURS PRN
Qty: 30 TABLET | Refills: 0 | Status: SHIPPED | OUTPATIENT
Start: 2018-02-10 | End: 2018-09-13 | Stop reason: SDUPTHER

## 2018-02-10 NOTE — ED PROVIDER NOTES
Subjective   History of Present Illness  History of Present Illness    Chief complaint: Fall with low back pain    Location: Home    Quality/Severity:  Moderate, sharp pain    Timing/Onset/Duration: Acute onset approximately one prior to arrival    Modifying Factors: It hurts to turn her trunk from side to side.  It feels better to remain still    Associated Symptoms: Patient denies any headache.  No loss of consciousness.  No neck pain.  No chest pain or shortness of breath.  No abdominal pain.  No numbness, tingling, weakness, or change in bladder or bowel function.  No nausea or vomiting.    Narrative: This 58-year-old white female slipped on some wet steps at home.  She fell backwards onto her back.  This happened one hour prior to arrival.  The patient had no loss of consciousness.  She has no headache.  No neck pain.  No chest pain or shortness of breath.  She complains of low back pain.  No abdominal pain.  No numbness, tingling, weakness, or change in bladder or bowel function.  Nausea or vomiting.    PCP: [facility]      Review of Systems   Constitutional: Negative.    HENT: Negative for nosebleeds and rhinorrhea.    Respiratory: Negative for shortness of breath.    Cardiovascular: Negative for chest pain.   Gastrointestinal: Negative for abdominal pain and nausea.   Genitourinary: Negative for difficulty urinating and hematuria.   Musculoskeletal: Positive for back pain. Negative for neck pain.   Skin: Negative for wound.   Neurological: Negative for weakness and headaches.   Psychiatric/Behavioral: Negative for confusion.        Medication List      ASK your doctor about these medications          acetaminophen 500 MG tablet   Commonly known as:  TYLENOL       aspirin 81 MG tablet       atorvastatin 40 MG tablet   Commonly known as:  LIPITOR   Take 1 tablet by mouth Every Night.       carvedilol 3.125 MG tablet   Commonly known as:  COREG   TAKE ONE PO BID       insulin  UNIT/ML injection   Commonly  known as:  humuLIN N,novoLIN N       insulin regular 100 UNIT/ML injection   Commonly known as:  humuLIN R,novoLIN R       lisinopril 10 MG tablet   Commonly known as:  PRINIVIL,ZESTRIL   TAKE ONE TABLET BY MOUTH TWICE A DAY       meclizine 25 MG tablet   Commonly known as:  ANTIVERT   Take 1 tablet by mouth 3 (Three) Times a Day As Needed for dizziness or   Nausea for up to 30 doses.       melatonin 5 MG tablet tablet       metaxalone 800 MG tablet   Commonly known as:  SKELAXIN   Take 1 tablet by mouth 4 (Four) Times a Day As Needed for Muscle Spasms   for up to 24 doses.       ondansetron ODT 4 MG disintegrating tablet   Commonly known as:  ZOFRAN ODT   Take 1 tablet by mouth Every 6 (Six) Hours As Needed for Nausea or   Vomiting for up to 30 doses.       pantoprazole 40 MG EC tablet   Commonly known as:  PROTONIX   Take one po  Daily       Scopolamine 1.5 MG/3DAYS patch   Commonly known as:  TRANSDERM-SCOP   Place 1 patch on the skin Every 72 (Seventy-Two) Hours.       traMADol 50 MG tablet   Commonly known as:  ULTRAM   1 - 2 tablets po q 4 hours PRN sever pain           Past Medical History:   Diagnosis Date   • AC (acromioclavicular) joint bone spurs    • Diabetes mellitus    • Herpes zoster    • Hyperlipidemia    • Hypertension    • Myocardial infarction        No Known Allergies    Past Surgical History:   Procedure Laterality Date   • BREAST LUMPECTOMY     • BREAST LUMPECTOMY      for benign lump   • CARDIAC SURGERY  2014   •  SECTION     • CORONARY ARTERY BYPASS GRAFT     • KNEE SURGERY         Family History   Problem Relation Age of Onset   • Hypertension Father    • Diabetes Father    • Glaucoma Father    • Hypotension Father    • Kidney failure Father    • Anxiety disorder Father    • Hypertension Sister    • Diabetes Sister    • Anxiety disorder Sister    • Osteoporosis Mother    • Cataracts Mother    • Heart failure Mother        Social History     Social History   • Marital status:  Legally      Spouse name: N/A   • Number of children: N/A   • Years of education: N/A     Social History Main Topics   • Smoking status: Former Smoker     Packs/day: 1.00     Years: 15.00     Quit date: 7/9/2014   • Smokeless tobacco: Never Used   • Alcohol use Yes   • Drug use: No   • Sexual activity: Not Asked     Other Topics Concern   • None     Social History Narrative           Objective   Physical Exam   Constitutional: She is oriented to person, place, and time. She appears well-developed and well-nourished. No distress.   ED Triage Vitals:  Temp: 98 °F (36.7 °C) (02/10/18 1530)  Heart Rate: 78 (02/10/18 1530)  Resp: 18 (02/10/18 1530)  BP: 153/85 (02/10/18 1530)  SpO2: 100 % (02/10/18 1530)  Temp src: Oral (02/10/18 1530)  Heart Rate Source: Monitor (02/10/18 1530)  Patient Position: Sitting (02/10/18 1530)  BP Location: Left arm (02/10/18 1530)  FiO2 (%): n/a    The patient's vitals were reviewed by me.  Unless otherwise noted they are within normal limits.  The patient's blood pressure is elevated at 153/85.     HENT:   Head: Normocephalic and atraumatic.   Right Ear: External ear normal.   Left Ear: External ear normal.   Nose: Nose normal.   Mouth/Throat: Oropharynx is clear and moist.   Eyes: Conjunctivae and EOM are normal. Pupils are equal, round, and reactive to light. Right eye exhibits no discharge. Left eye exhibits no discharge.   Neck: Normal range of motion. Neck supple. No JVD present. No tracheal deviation present. No thyromegaly present.   There is no tenderness, deformity, or bony step-offs upon palpation the cervical, and thoracic spine.    There is no deformity or bony step-offs upon palpation of the lumbar sacral coccygeal spine.  There is right paralumbar tenderness.  There is moderate tenderness upon palpation the right posterior pelvic ring.    There is no ecchymosis, lacerations, abrasions.   Cardiovascular: Normal rate, regular rhythm, normal heart sounds and intact distal  pulses.  Exam reveals no gallop and no friction rub.    No murmur heard.  Pulmonary/Chest: Effort normal and breath sounds normal. No stridor. No respiratory distress. She has no wheezes. She has no rales. She exhibits no tenderness.   Abdominal: Soft. Bowel sounds are normal. She exhibits no distension and no mass. There is no tenderness. There is no rebound and no guarding. No hernia.   Musculoskeletal: Normal range of motion. She exhibits no edema, tenderness or deformity.   Lymphadenopathy:     She has no cervical adenopathy.   Neurological: She is alert and oriented to person, place, and time. No cranial nerve deficit. She exhibits normal muscle tone. Coordination normal.   Skin: Skin is dry. No rash noted. She is not diaphoretic. No erythema. No pallor.   Psychiatric: Her behavior is normal.   Nursing note and vitals reviewed.      Procedures         ED Course  ED Course   Comment By Time   The laboratory values were reviewed by me.  There is 500 mg/dL of glucose in the urine.  The left upper values are otherwise unremarkable. Flavio Hansen MD 02/10 1748      5:55 PM, 02/10/18:  Patient was reassessed.  Her vital signs were reviewed and are stable.  Abdominal exam: Soft nontender no masses positive bowel sounds.  Neurological exam:  Conscious alert and oriented ×4 with no focal deficits noted.    5:56 PM, 02/10/18:  The patient's diagnosis of a fall with lumbar strain and back contusion and elevated blood pressure was discussed with her.  She will be given a prescription for pain medication and a muscle relaxant.  She should follow-up with her primary care provider in one week.  She should have her blood pressure rechecked at that time.  He should return if she has increasing pain, numbness, tingling, weakness, change in bladder or bowel function, worse in any way at all.  All of the patient's questions were answered the patient be discharged in good condition.    6:04 PM, 02/10/18:    The [facility] report  was reviewed by me.  The report number is [id number].            MDM  XR Pelvis 1 or 2 View    (Results Pending)   XR Spine Lumbar AP & Lateral    (Results Pending)     Labs Reviewed   URINALYSIS W/ CULTURE IF INDICATED     No results found.    Final diagnoses:   None         ED Medications:  Medications - No data to display    New Medications:     Medication List      ASK your doctor about these medications          acetaminophen 500 MG tablet   Commonly known as:  TYLENOL       aspirin 81 MG tablet       atorvastatin 40 MG tablet   Commonly known as:  LIPITOR   Take 1 tablet by mouth Every Night.       carvedilol 3.125 MG tablet   Commonly known as:  COREG   TAKE ONE PO BID       insulin  UNIT/ML injection   Commonly known as:  humuLIN N,novoLIN N       insulin regular 100 UNIT/ML injection   Commonly known as:  humuLIN R,novoLIN R       lisinopril 10 MG tablet   Commonly known as:  PRINIVIL,ZESTRIL   TAKE ONE TABLET BY MOUTH TWICE A DAY       meclizine 25 MG tablet   Commonly known as:  ANTIVERT   Take 1 tablet by mouth 3 (Three) Times a Day As Needed for dizziness or   Nausea for up to 30 doses.       melatonin 5 MG tablet tablet       metaxalone 800 MG tablet   Commonly known as:  SKELAXIN   Take 1 tablet by mouth 4 (Four) Times a Day As Needed for Muscle Spasms   for up to 24 doses.       ondansetron ODT 4 MG disintegrating tablet   Commonly known as:  ZOFRAN ODT   Take 1 tablet by mouth Every 6 (Six) Hours As Needed for Nausea or   Vomiting for up to 30 doses.       pantoprazole 40 MG EC tablet   Commonly known as:  PROTONIX   Take one po  Daily       Scopolamine 1.5 MG/3DAYS patch   Commonly known as:  TRANSDERM-SCOP   Place 1 patch on the skin Every 72 (Seventy-Two) Hours.       traMADol 50 MG tablet   Commonly known as:  ULTRAM   1 - 2 tablets po q 4 hours PRN sever pain           Stopped Medications:     Medication List      ASK your doctor about these medications          acetaminophen 500 MG tablet    Commonly known as:  TYLENOL       aspirin 81 MG tablet       atorvastatin 40 MG tablet   Commonly known as:  LIPITOR   Take 1 tablet by mouth Every Night.       carvedilol 3.125 MG tablet   Commonly known as:  COREG   TAKE ONE PO BID       insulin  UNIT/ML injection   Commonly known as:  humuLIN N,novoLIN N       insulin regular 100 UNIT/ML injection   Commonly known as:  humuLIN R,novoLIN R       lisinopril 10 MG tablet   Commonly known as:  PRINIVIL,ZESTRIL   TAKE ONE TABLET BY MOUTH TWICE A DAY       meclizine 25 MG tablet   Commonly known as:  ANTIVERT   Take 1 tablet by mouth 3 (Three) Times a Day As Needed for dizziness or   Nausea for up to 30 doses.       melatonin 5 MG tablet tablet       metaxalone 800 MG tablet   Commonly known as:  SKELAXIN   Take 1 tablet by mouth 4 (Four) Times a Day As Needed for Muscle Spasms   for up to 24 doses.       ondansetron ODT 4 MG disintegrating tablet   Commonly known as:  ZOFRAN ODT   Take 1 tablet by mouth Every 6 (Six) Hours As Needed for Nausea or   Vomiting for up to 30 doses.       pantoprazole 40 MG EC tablet   Commonly known as:  PROTONIX   Take one po  Daily       Scopolamine 1.5 MG/3DAYS patch   Commonly known as:  TRANSDERM-SCOP   Place 1 patch on the skin Every 72 (Seventy-Two) Hours.       traMADol 50 MG tablet   Commonly known as:  ULTRAM   1 - 2 tablets po q 4 hours PRN sever pain             Final diagnoses:   Fall, initial encounter   Strain of lumbar region, initial encounter   Contusion of right side of back, initial encounter   Elevated blood pressure reading            Flavio Hansen MD  02/10/18 1805       Flavio Hansen MD  02/10/18 1824

## 2018-02-10 NOTE — DISCHARGE INSTRUCTIONS
Follow-up with Dr. Duckworth in 1 week.  Return to the emergency department if you have increasing pain, numbness, tingling, weakness, change in bladder or bowel function, worse in any way at all.

## 2018-05-09 RX ORDER — ATORVASTATIN CALCIUM 40 MG/1
TABLET, FILM COATED ORAL
Qty: 90 TABLET | Refills: 0 | Status: SHIPPED | OUTPATIENT
Start: 2018-05-09 | End: 2018-08-09 | Stop reason: SDUPTHER

## 2018-07-09 RX ORDER — BLOOD SUGAR DIAGNOSTIC
STRIP MISCELLANEOUS
Qty: 360 EACH | Refills: 4 | Status: SHIPPED | OUTPATIENT
Start: 2018-07-09 | End: 2022-05-06 | Stop reason: SDUPTHER

## 2018-07-09 RX ORDER — PANTOPRAZOLE SODIUM 40 MG/1
TABLET, DELAYED RELEASE ORAL
Qty: 30 TABLET | Refills: 6 | Status: SHIPPED | OUTPATIENT
Start: 2018-07-09 | End: 2018-09-13 | Stop reason: SDUPTHER

## 2018-08-09 RX ORDER — ATORVASTATIN CALCIUM 40 MG/1
TABLET, FILM COATED ORAL
Qty: 30 TABLET | Refills: 0 | Status: SHIPPED | OUTPATIENT
Start: 2018-08-09 | End: 2018-09-13 | Stop reason: SDUPTHER

## 2018-08-14 ENCOUNTER — APPOINTMENT (OUTPATIENT)
Dept: GENERAL RADIOLOGY | Facility: HOSPITAL | Age: 58
End: 2018-08-14

## 2018-08-14 ENCOUNTER — HOSPITAL ENCOUNTER (EMERGENCY)
Facility: HOSPITAL | Age: 58
Discharge: HOME | End: 2018-08-14
Attending: EMERGENCY MEDICINE | Admitting: EMERGENCY MEDICINE

## 2018-08-14 VITALS
TEMPERATURE: 98 F | DIASTOLIC BLOOD PRESSURE: 80 MMHG | RESPIRATION RATE: 17 BRPM | OXYGEN SATURATION: 96 % | WEIGHT: 236.1 LBS | HEIGHT: 68 IN | BODY MASS INDEX: 35.78 KG/M2 | SYSTOLIC BLOOD PRESSURE: 163 MMHG | HEART RATE: 60 BPM

## 2018-08-14 DIAGNOSIS — M51.36 DEGENERATIVE DISC DISEASE, LUMBAR: ICD-10-CM

## 2018-08-14 DIAGNOSIS — M79.672 LEFT FOOT PAIN: Primary | ICD-10-CM

## 2018-08-14 PROCEDURE — 99282 EMERGENCY DEPT VISIT SF MDM: CPT | Performed by: PHYSICIAN ASSISTANT

## 2018-08-14 PROCEDURE — 72110 X-RAY EXAM L-2 SPINE 4/>VWS: CPT

## 2018-08-14 PROCEDURE — 73610 X-RAY EXAM OF ANKLE: CPT

## 2018-08-14 PROCEDURE — 99283 EMERGENCY DEPT VISIT LOW MDM: CPT

## 2018-08-14 PROCEDURE — 73630 X-RAY EXAM OF FOOT: CPT

## 2018-08-14 RX ORDER — MELOXICAM 7.5 MG/1
7.5 TABLET ORAL DAILY PRN
Qty: 15 TABLET | Refills: 0 | OUTPATIENT
Start: 2018-08-14 | End: 2018-11-17

## 2018-08-14 RX ORDER — HYDROCODONE BITARTRATE AND ACETAMINOPHEN 5; 325 MG/1; MG/1
1 TABLET ORAL ONCE
Status: COMPLETED | OUTPATIENT
Start: 2018-08-14 | End: 2018-08-14

## 2018-08-14 RX ADMIN — HYDROCODONE BITARTRATE AND ACETAMINOPHEN 1 TABLET: 5; 325 TABLET ORAL at 14:52

## 2018-08-14 NOTE — ED PROVIDER NOTES
"Subjective   History of Present Illness  History of Present Illness    Chief complaint: foot / ankle pain    Location:L heel, ankle and top of foot with radiation up the back of her leg    Quality/Severity:  Ache, severe    Timing/Duration: chronic, worse past week    Modifying Factors: walking and standing makes worse.  It is worse at the end of the day.  It is not worse in the morning.  Nothing makes better.     Associated Symptoms: denies fevers/chills. Denies erythema.     Narrative: 58-year-old female presents with foot and ankle pain that has been going on for \"a long time.\".  She states the pain has been severe for the past week.  She denies any known injury.  She states that she has previously had stress fractures in that foot as well as heel spurs on the right foot.  She states that the pain will radiate up her leg.  She took Tylenol 650 mg prior to arrival because the pain was so severe.  She follows with Dr. Remy for her right foot and has an appointment to see him in 2 days, but she states that the pain was so severe she could not wait any further.    Review of Systems  General: Denies fevers or chills.  Denies any weakness or fatigue.  Denies any weight loss or weight gain.  SKIN: Denies any rashes lesions or ulcers.  Denies color change.  ENT: Denies sore throat or rhinorrhea.  Denies ear pain.    EYES: Denies any blurred vision.  Denies any change in vision.  Denies any photophobia.  Denies any vision loss.  LUNGS: Denies any shortness of breath or wheezing.  Denies any cough.  Denies any hemoptysis.  CARDIAC: Denies any chest pain.  Denies palpitations.  Denies syncope.  Denies any edema  ABD: Denies any abdominal pain.  Denies any nausea or vomiting or diarrhea.  Denies any rectal bleeding.  Denies constipation  : Denies any dysuria, urgency, frequency or hematuria.  Denies discharge.  Denies flank pain.  NEURO: Denies any focal weakness.  Denies headache.  Denies seizures.  Denies changes in " speech or difficulty walking.  ENDOCRINE: Denies polydipsia and polyuria  M/S: As above.  Denies  back pain, myalgias or neck pain  HEME/LYMPH: Negative for adenopathy. Does not bruise/bleed easily.   PSYCH: Negative for suicidal ideas. Denies anxiety or depression  review was performed in addition to those in the above all other reviews are negative.      Past Medical History:   Diagnosis Date   • AC (acromioclavicular) joint bone spurs    • Diabetes mellitus (CMS/HCC)    • Herpes zoster    • Hyperlipidemia    • Hypertension    • Myocardial infarction        No Known Allergies    Past Surgical History:   Procedure Laterality Date   • BREAST LUMPECTOMY     • BREAST LUMPECTOMY      for benign lump   • CARDIAC SURGERY  2014   •  SECTION     • CORONARY ARTERY BYPASS GRAFT     • KNEE SURGERY         Family History   Problem Relation Age of Onset   • Hypertension Father    • Diabetes Father    • Glaucoma Father    • Hypotension Father    • Kidney failure Father    • Anxiety disorder Father    • Hypertension Sister    • Diabetes Sister    • Anxiety disorder Sister    • Osteoporosis Mother    • Cataracts Mother    • Heart failure Mother        Social History     Social History   • Marital status: Legally      Social History Main Topics   • Smoking status: Former Smoker     Packs/day: 1.00     Years: 15.00     Quit date: 2014   • Smokeless tobacco: Never Used   • Alcohol use Yes   • Drug use: No     Other Topics Concern   • Not on file       Current Facility-Administered Medications:   •  HYDROcodone-acetaminophen (NORCO) 5-325 MG per tablet 1 tablet, 1 tablet, Oral, Once, Celso Beck MD    Current Outpatient Prescriptions:   •  acetaminophen (TYLENOL) 500 MG tablet, Take 1,000 mg by mouth 2 (Two) Times a Day., Disp: , Rfl:   •  aspirin 81 MG tablet, Take 1 tablet by mouth daily., Disp: , Rfl:   •  atorvastatin (LIPITOR) 40 MG tablet, TAKE ONE TABLET BY MOUTH ONCE NIGHTLY, Disp: 30 tablet,  Rfl: 0  •  carvedilol (COREG) 3.125 MG tablet, TAKE ONE PO BID, Disp: 180 tablet, Rfl: 1  •  cyclobenzaprine (FLEXERIL) 10 MG tablet, Take 1 tablet by mouth 3 (Three) Times a Day As Needed for Muscle Spasms., Disp: 30 tablet, Rfl: 0  •  insulin NPH (humuLIN N,novoLIN N) 100 UNIT/ML injection, Inject 32 Units under the skin Every Night., Disp: , Rfl:   •  insulin regular (humuLIN R,novoLIN R) 100 UNIT/ML injection, Inject 15 Units under the skin 3 (Three) Times a Day Before Meals. 15-20 units at breakfast  15-20 units at lunch  25-45 units at dinner, Disp: , Rfl:   •  lisinopril (PRINIVIL,ZESTRIL) 10 MG tablet, TAKE ONE TABLET BY MOUTH TWICE A DAY, Disp: 60 tablet, Rfl: 11  •  meclizine (ANTIVERT) 25 MG tablet, Take 1 tablet by mouth 3 (Three) Times a Day As Needed for dizziness or Nausea for up to 30 doses., Disp: 30 tablet, Rfl: 0  •  melatonin 5 MG tablet tablet, Take 5 mg by mouth., Disp: , Rfl:   •  metaxalone (SKELAXIN) 800 MG tablet, Take 1 tablet by mouth 4 (Four) Times a Day As Needed for Muscle Spasms for up to 24 doses., Disp: 24 tablet, Rfl: 0  •  ondansetron ODT (ZOFRAN ODT) 4 MG disintegrating tablet, Take 1 tablet by mouth Every 6 (Six) Hours As Needed for Nausea or Vomiting for up to 30 doses., Disp: 30 tablet, Rfl: 0  •  pantoprazole (PROTONIX) 40 MG EC tablet, TAKE ONE TABLET BY MOUTH DAILY, Disp: 30 tablet, Rfl: 6  •  Scopolamine (TRANSDERM-SCOP) 1.5 MG/3DAYS patch, Place 1 patch on the skin Every 72 (Seventy-Two) Hours., Disp: 4 patch, Rfl: 0  •  traMADol (ULTRAM) 50 MG tablet, 1 - 2 tablets po q 4 hours PRN sever pain, Disp: 24 tablet, Rfl: 0  •  traMADol (ULTRAM) 50 MG tablet, Take 1 tablet by mouth Every 6 (Six) Hours As Needed for Moderate Pain ., Disp: 30 tablet, Rfl: 0  •  WAVESENSE PRESTO test strip, USE ONE STRIP TO TEST FOUR TIMES A DAY, Disp: 360 each, Rfl: 4        Objective   Physical Exam  Vitals:    08/14/18 1440   BP: (!) 186/93   Pulse: 64   Resp: 17   Temp: 98 °F (36.7 °C)   SpO2:  99%       GENERAL: Alert and oriented ×4.  No apparent distress.  SKIN: Warm, pink and dry  HEENT: Atraumatic normocephalic  LUNGS: Clear to auscultation bilaterally without wheezes, rales or rhonchi  CARDIAC: Regular rate and rhythm.  S1 and S2.  No murmurs, rubs or gallops.  M/S: MAEW, no deformity. L heel, medial ankle, achilles TTP, achilles intact. No calf tenderness. No edema, erythema, ecchymosis or abrasions  PSYCH: Normal mood and affect    Procedures           ED Course    norco x 1.     Reviewed xrays as below. Independently viewed by me. Interpreted by radiologist. Discussed with pt.  Xr Ankle 3+ View Left    Result Date: 8/14/2018  Narrative: 8/14/2018: 1. LEFT FOOT SERIES. 2. LEFT ANKLE SERIES.     HISTORY: 58-year-old female in the ED complaining of several month history of left foot and ankle pain, worsening over the last two weeks. Of note, MRI examination here last year showed 4th and 5th metatarsal stress fractures that were radiographically occult.  TECHNIQUE: Three-view left foot series. Three-view left ankle series.  COMPARISON: *  Left foot series, 12-17. *  MRI left foot, 12/12/2017.  FINDINGS: There is no evidence of metatarsal or calcaneal stress fracture. Mild degenerative arthropathy is present at the 1st MTP joint. Plantar calcaneal bone spur. Left foot series is otherwise negative.  Left ankle series is negative. No fracture, arthropathy or other osseous abnormality.      Impression: 1. No acute osseous abnormality involving the left foot or left ankle. 2. No radiographic evidence of stress fracture at this time. 3. Mild degenerative changes at the 1st MTP joint.  This report was finalized on 8/14/2018 3:58 PM by Dr. Red Navarro MD.      Xr Foot 3+ View Left    Result Date: 8/14/2018  Narrative: 8/14/2018: 1. LEFT FOOT SERIES. 2. LEFT ANKLE SERIES.     HISTORY: 58-year-old female in the ED complaining of several month history of left foot and ankle pain, worsening over the last  two weeks. Of note, MRI examination here last year showed 4th and 5th metatarsal stress fractures that were radiographically occult.  TECHNIQUE: Three-view left foot series. Three-view left ankle series.  COMPARISON: *  Left foot series, 12-17. *  MRI left foot, 12/12/2017.  FINDINGS: There is no evidence of metatarsal or calcaneal stress fracture. Mild degenerative arthropathy is present at the 1st MTP joint. Plantar calcaneal bone spur. Left foot series is otherwise negative.  Left ankle series is negative. No fracture, arthropathy or other osseous abnormality.      Impression: 1. No acute osseous abnormality involving the left foot or left ankle. 2. No radiographic evidence of stress fracture at this time. 3. Mild degenerative changes at the 1st MTP joint.  This report was finalized on 8/14/2018 3:58 PM by Dr. Red Navarro MD.      Xr Spine Lumbar 4+ View    Result Date: 8/14/2018  Narrative: LUMBAR SPINE, 8/14/2018     HISTORY: 58-year-old female the ED complaining of several month history of left lower extremity pain, primarily in the foot and ankle but radiating upward into the hip. No reported acute injury.  TECHNIQUE: Five lumbar spine series.  COMPARISON: *  Lumbar spine, 2/10/2018.  FINDINGS: No acute or chronic fracture deformity or additional osseous lesion.  Moderate degenerative disc space narrowing at T12-L1, L1-2 and L4-5. Lower lumbar degenerative facet arthropathy with grade 1 anterolisthesis at L4-5. No significant change since the prior exam.      Impression: 1. No acute osseous abnormality. 2. Multilevel degenerative changes as noted above. 3. No change since 2/10/2018.  This report was finalized on 8/14/2018 4:00 PM by Dr. Red Navarro MD.      Educated pt. F/u podiatry 2 days as previously scheduled.  D/c mobic. Post op shoe.  Pt has cane at home.    Discussed pertinent labs and imaging findings with the patient/family.  Patient/Family voiced understanding of need to follow-up for  recheck, further testing as needed.  Return to the emergency Department warnings were given.              MDM  Number of Diagnoses or Management Options  Degenerative disc disease, lumbar: new and requires workup  Left foot pain: new and requires workup     Amount and/or Complexity of Data Reviewed  Tests in the radiology section of CPT®: ordered and reviewed  Tests in the medicine section of CPT®: reviewed and ordered  Independent visualization of images, tracings, or specimens: yes    Risk of Complications, Morbidity, and/or Mortality  Presenting problems: low  Diagnostic procedures: low  Management options: low    Patient Progress  Patient progress: improved        Final diagnoses:   Left foot pain   Degenerative disc disease, lumbar     Dictated utilizing Dragon dictation       Laila Umanzor PAGwenC  08/14/18 3542

## 2018-09-06 RX ORDER — ATORVASTATIN CALCIUM 40 MG/1
TABLET, FILM COATED ORAL
Qty: 30 TABLET | Refills: 0 | OUTPATIENT
Start: 2018-09-06

## 2018-09-13 ENCOUNTER — OFFICE VISIT (OUTPATIENT)
Dept: INTERNAL MEDICINE | Facility: CLINIC | Age: 58
End: 2018-09-13

## 2018-09-13 VITALS
OXYGEN SATURATION: 98 % | HEART RATE: 64 BPM | HEIGHT: 68 IN | BODY MASS INDEX: 34.28 KG/M2 | WEIGHT: 226.2 LBS | DIASTOLIC BLOOD PRESSURE: 90 MMHG | SYSTOLIC BLOOD PRESSURE: 146 MMHG

## 2018-09-13 DIAGNOSIS — E13.9 LATENT AUTOIMMUNE DIABETES IN ADULTS (LADA), MANAGED AS TYPE 1 (HCC): ICD-10-CM

## 2018-09-13 DIAGNOSIS — Z00.00 HEALTHCARE MAINTENANCE: ICD-10-CM

## 2018-09-13 DIAGNOSIS — I25.10 CHRONIC CORONARY ARTERY DISEASE: Primary | ICD-10-CM

## 2018-09-13 DIAGNOSIS — K21.9 GASTROESOPHAGEAL REFLUX DISEASE, ESOPHAGITIS PRESENCE NOT SPECIFIED: ICD-10-CM

## 2018-09-13 DIAGNOSIS — I10 ESSENTIAL HYPERTENSION: ICD-10-CM

## 2018-09-13 DIAGNOSIS — G57.91 NEUROPATHY OF RIGHT FOOT: ICD-10-CM

## 2018-09-13 DIAGNOSIS — E78.5 HYPERLIPIDEMIA, UNSPECIFIED HYPERLIPIDEMIA TYPE: ICD-10-CM

## 2018-09-13 PROCEDURE — 99214 OFFICE O/P EST MOD 30 MIN: CPT | Performed by: INTERNAL MEDICINE

## 2018-09-13 RX ORDER — CARVEDILOL 3.12 MG/1
TABLET ORAL
Qty: 180 TABLET | Refills: 1 | Status: SHIPPED | OUTPATIENT
Start: 2018-09-13 | End: 2019-03-19 | Stop reason: SDUPTHER

## 2018-09-13 RX ORDER — LISINOPRIL 40 MG/1
40 TABLET ORAL DAILY
Qty: 90 TABLET | Refills: 0 | Status: SHIPPED | OUTPATIENT
Start: 2018-09-13 | End: 2018-12-22 | Stop reason: SDUPTHER

## 2018-09-13 RX ORDER — PANTOPRAZOLE SODIUM 40 MG/1
40 TABLET, DELAYED RELEASE ORAL DAILY
Qty: 90 TABLET | Refills: 1 | Status: SHIPPED | OUTPATIENT
Start: 2018-09-13 | End: 2020-02-03

## 2018-09-13 RX ORDER — ATORVASTATIN CALCIUM 40 MG/1
40 TABLET, FILM COATED ORAL NIGHTLY
Qty: 90 TABLET | Refills: 1 | Status: SHIPPED | OUTPATIENT
Start: 2018-09-13 | End: 2019-02-28 | Stop reason: SDUPTHER

## 2018-09-13 NOTE — PROGRESS NOTES
Kristine Rivas is a 58 y.o. female, who presents with a chief complaint of   Chief Complaint   Patient presents with   • Follow-up   • Prediabetes       HPI   Pt is here for follow up. She has uncontrolled dm and htn.  She has had a NSTEMI in the past.  Her last OV was over a year ago.    DM - pt getting insulin OTC from [facility].  Her last labs were over a year ago.  Her sister has been cooking and the pt says that her sister's cooking makes her glucoses go higher.     Left foot pain - she is in a boot and has been getting steroids off and on. She has had lots of arthritis.      HTN - on carvedilol, lisinopril.  She denies ha/dizziness.  bp is uncontrolled.      gerd - on PPI.  Sx controlled as log as she is on med.      CAD s/p cabg - pt denies chest pain, soa.  She has had some LE edema.      HLD - pt on statin but has been off for a week bc she hadn't had a f/u appt..  No cramps or myalgias.      The following portions of the patient's history were reviewed and updated as appropriate: allergies, current medications, past family history, past medical history, past social history, past surgical history and problem list.    Allergies: Patient has no known allergies.    Review of Systems   Constitutional: Positive for fatigue.   HENT: Negative.    Eyes: Negative.    Respiratory: Negative.    Cardiovascular: Negative.    Gastrointestinal: Negative.    Endocrine: Negative.    Genitourinary: Negative.    Musculoskeletal:        Foot pain   Skin: Negative.    Allergic/Immunologic: Negative.    Neurological: Negative.    Hematological: Negative.    Psychiatric/Behavioral: Negative.    All other systems reviewed and are negative.            Wt Readings from Last 3 Encounters:   09/13/18 103 kg (226 lb 3.2 oz)   08/14/18 107 kg (236 lb 1.6 oz)   02/10/18 90.7 kg (200 lb)     Temp Readings from Last 3 Encounters:   08/14/18 98 °F (36.7 °C) (Oral)   02/10/18 98 °F (36.7 °C) (Oral)   12/02/17 98 °F (36.7 °C) (Oral)      BP Readings from Last 3 Encounters:   09/13/18 146/90   08/14/18 163/80   02/10/18 143/84     Pulse Readings from Last 3 Encounters:   09/13/18 64   08/14/18 60   02/10/18 69     Body mass index is 34.39 kg/m².  @LASTSAO2(3)@    Physical Exam   Constitutional: She is oriented to person, place, and time. She appears well-developed and well-nourished. No distress.   HENT:   Head: Normocephalic and atraumatic.   Right Ear: External ear normal.   Left Ear: External ear normal.   Nose: Nose normal.   Mouth/Throat: Oropharynx is clear and moist.   Eyes: Pupils are equal, round, and reactive to light. Conjunctivae and EOM are normal.   Neck: Normal range of motion. Neck supple.   Cardiovascular: Normal rate, regular rhythm, normal heart sounds and intact distal pulses.    Pulmonary/Chest: Effort normal and breath sounds normal. No respiratory distress. She has no wheezes.   Musculoskeletal: Normal range of motion.   Normal gait   Neurological: She is alert and oriented to person, place, and time.   Skin: Skin is warm and dry.   Psychiatric: She has a normal mood and affect. Her behavior is normal. Judgment and thought content normal.   Nursing note and vitals reviewed.      Results for orders placed or performed during the hospital encounter of 02/10/18   Urinalysis With / Culture If Indicated - Urine, Clean Catch   Result Value Ref Range    Color, UA Yellow Yellow, Straw    Appearance, UA Clear Clear    pH, UA 5.5 4.5 - 8.0    Specific Gravity, UA 1.020 1.003 - 1.030    Glucose,  mg/dL (2+) (A) Negative    Ketones, UA Negative Negative, 80 mg/dL (3+), >=160 mg/dL (4+)    Bilirubin, UA Negative Negative    Blood, UA Negative Negative    Protein, UA Negative Negative    Leuk Esterase, UA Negative Negative    Nitrite, UA Negative Negative    Urobilinogen, UA 0.2 E.U./dL 0.2 - 1.0 E.U./dL           Kristine was seen today for follow-up and prediabetes.    Diagnoses and all orders for this visit:    Chronic coronary  artery disease  -     Comprehensive Metabolic Panel  -     CBC & Differential  -     T4, Free  -     TSH  -     Lipid Panel With LDL / HDL Ratio  -     atorvastatin (LIPITOR) 40 MG tablet; Take 1 tablet by mouth Every Night.  -     carvedilol (COREG) 3.125 MG tablet; TAKE ONE PO BID    Hyperlipidemia, unspecified hyperlipidemia type  -     Comprehensive Metabolic Panel  -     Lipid Panel With LDL / HDL Ratio  -     atorvastatin (LIPITOR) 40 MG tablet; Take 1 tablet by mouth Every Night.    Essential hypertension  -     Comprehensive Metabolic Panel  -     CBC & Differential  -     T4, Free  -     TSH  -     lisinopril (PRINIVIL,ZESTRIL) 40 MG tablet; Take 1 tablet by mouth Daily.  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    Neuropathy of right foot    Healthcare maintenance  -     Comprehensive Metabolic Panel  -     CBC & Differential  -     T4, Free  -     TSH  -     Lipid Panel With LDL / HDL Ratio  -     Hemoglobin A1c  -     Microalbumin / Creatinine Urine Ratio - Urine, Clean Catch  -     Mammo Screening Bilateral With CAD; Future    Gastroesophageal reflux disease, esophagitis presence not specified  -     pantoprazole (PROTONIX) 40 MG EC tablet; Take 1 tablet by mouth Daily.          Outpatient Medications Prior to Visit   Medication Sig Dispense Refill   • acetaminophen (TYLENOL) 500 MG tablet Take 1,000 mg by mouth 2 (Two) Times a Day.     • aspirin 81 MG tablet Take 1 tablet by mouth daily.     • cyclobenzaprine (FLEXERIL) 10 MG tablet Take 1 tablet by mouth 3 (Three)  Times a Day As Needed for Muscle Spasms. 30 tablet 0   • insulin NPH (humuLIN N,novoLIN N) 100 UNIT/ML injection Inject 32 Units under the skin Every Night.     • insulin regular (humuLIN R,novoLIN R) 100 UNIT/ML injection Inject 15 Units under the skin 3 (Three) Times a Day Before Meals. 15-20 units at breakfast   15-20 units at lunch   25-45 units at dinner     • meclizine (ANTIVERT) 25 MG tablet Take 1 tablet by mouth 3 (Three) Times a Day As Needed for dizziness or Nausea for up to 30 doses. 30 tablet 0   • melatonin 5 MG tablet tablet Take 5 mg by mouth.     • meloxicam (MOBIC) 7.5 MG tablet Take 1 tablet by mouth Daily As Needed for Moderate Pain . 15 tablet 0   • metaxalone (SKELAXIN) 800 MG tablet Take 1 tablet by mouth 4 (Four) Times a Day As Needed for Muscle Spasms for up to 24 doses. 24 tablet 0   • ondansetron ODT (ZOFRAN ODT) 4 MG disintegrating tablet Take 1 tablet by mouth Every 6 (Six) Hours As Needed for Nausea or Vomiting for up to 30 doses. 30 tablet 0   • Scopolamine (TRANSDERM-SCOP) 1.5 MG/3DAYS patch Place 1 patch on the skin Every 72 (Seventy-Two) Hours. 4 patch 0   • traMADol (ULTRAM) 50 MG tablet 1 - 2 tablets po q 4 hours PRN sever pain 24 tablet 0   • WAVESENSE PRESTO test strip USE ONE STRIP TO TEST FOUR TIMES A  each 4   • atorvastatin (LIPITOR) 40 MG tablet TAKE ONE TABLET BY MOUTH ONCE NIGHTLY 30 tablet 0   • carvedilol (COREG) 3.125 MG tablet TAKE ONE PO  tablet 1   • lisinopril (PRINIVIL,ZESTRIL) 10 MG tablet TAKE ONE TABLET BY MOUTH TWICE A DAY 60 tablet 11   • pantoprazole (PROTONIX) 40 MG EC tablet TAKE ONE TABLET BY MOUTH DAILY 30 tablet 6   • traMADol (ULTRAM) 50 MG tablet Take 1 tablet by mouth Every 6 (Six) Hours As Needed for Moderate Pain . 30 tablet 0     No facility-administered medications prior to visit.      New Medications Ordered This Visit   Medications   • lisinopril (PRINIVIL,ZESTRIL) 40 MG tablet     Sig: Take 1 tablet by mouth Daily.     Dispense:   90 tablet     Refill:  0   • atorvastatin (LIPITOR) 40 MG tablet     Sig: Take 1 tablet by mouth Every Night.     Dispense:  90 tablet     Refill:  1   • carvedilol (COREG) 3.125 MG tablet     Sig: TAKE ONE PO BID     Dispense:  180 tablet     Refill:  1   • pantoprazole (PROTONIX) 40 MG EC tablet     Sig: Take 1 tablet by mouth Daily.     Dispense:  90 tablet     Refill:  1     [unfilled]  Medications Discontinued During This Encounter   Medication Reason   • traMADol (ULTRAM) 50 MG tablet Duplicate order   • lisinopril (PRINIVIL,ZESTRIL) 10 MG tablet Reorder   • atorvastatin (LIPITOR) 40 MG tablet Reorder   • carvedilol (COREG) 3.125 MG tablet Reorder   • pantoprazole (PROTONIX) 40 MG EC tablet Reorder         Return in about 3 months (around 12/13/2018) for Recheck, labs.

## 2018-09-14 LAB
ALBUMIN SERPL-MCNC: 4.5 G/DL (ref 3.5–5.2)
ALBUMIN/CREAT UR: <4.3 MG/G CREAT (ref 0–30)
ALBUMIN/GLOB SERPL: 1.9 G/DL
ALP SERPL-CCNC: 93 U/L (ref 40–129)
ALT SERPL-CCNC: 17 U/L (ref 5–33)
AST SERPL-CCNC: 14 U/L (ref 5–32)
BASOPHILS # BLD AUTO: 0.03 10*3/MM3 (ref 0–0.2)
BASOPHILS NFR BLD AUTO: 0.4 % (ref 0–2)
BILIRUB SERPL-MCNC: 0.3 MG/DL (ref 0.2–1.2)
BUN SERPL-MCNC: 15 MG/DL (ref 6–20)
BUN/CREAT SERPL: 21.7 (ref 7–25)
CALCIUM SERPL-MCNC: 9 MG/DL (ref 8.6–10.5)
CHLORIDE SERPL-SCNC: 102 MMOL/L (ref 98–107)
CHOLEST SERPL-MCNC: 198 MG/DL (ref 0–200)
CO2 SERPL-SCNC: 27.9 MMOL/L (ref 22–29)
CREAT SERPL-MCNC: 0.69 MG/DL (ref 0.57–1)
CREAT UR-MCNC: 69.2 MG/DL
EOSINOPHIL # BLD AUTO: 0.37 10*3/MM3 (ref 0.1–0.3)
EOSINOPHIL NFR BLD AUTO: 4.7 % (ref 0–4)
ERYTHROCYTE [DISTWIDTH] IN BLOOD BY AUTOMATED COUNT: 14.6 % (ref 11.5–14.5)
GLOBULIN SER CALC-MCNC: 2.4 GM/DL
GLUCOSE SERPL-MCNC: 110 MG/DL (ref 65–99)
HBA1C MFR BLD: 7.9 % (ref 4.8–5.6)
HCT VFR BLD AUTO: 39.5 % (ref 37–47)
HDLC SERPL-MCNC: 54 MG/DL (ref 40–60)
HGB BLD-MCNC: 12.2 G/DL (ref 12–16)
IMM GRANULOCYTES # BLD: 0.02 10*3/MM3 (ref 0–0.03)
IMM GRANULOCYTES NFR BLD: 0.3 % (ref 0–0.5)
LDLC SERPL CALC-MCNC: 113 MG/DL (ref 0–100)
LDLC/HDLC SERPL: 2.1 {RATIO}
LYMPHOCYTES # BLD AUTO: 2.74 10*3/MM3 (ref 0.6–4.8)
LYMPHOCYTES NFR BLD AUTO: 35.1 % (ref 20–45)
MCH RBC QN AUTO: 27 PG (ref 27–31)
MCHC RBC AUTO-ENTMCNC: 30.9 G/DL (ref 31–37)
MCV RBC AUTO: 87.4 FL (ref 81–99)
MICROALBUMIN UR-MCNC: <3 UG/ML
MONOCYTES # BLD AUTO: 0.56 10*3/MM3 (ref 0–1)
MONOCYTES NFR BLD AUTO: 7.2 % (ref 3–8)
NEUTROPHILS # BLD AUTO: 4.08 10*3/MM3 (ref 1.5–8.3)
NEUTROPHILS NFR BLD AUTO: 52.3 % (ref 45–70)
NRBC BLD AUTO-RTO: 0 /100 WBC (ref 0–0)
PLATELET # BLD AUTO: 281 10*3/MM3 (ref 140–500)
POTASSIUM SERPL-SCNC: 4.5 MMOL/L (ref 3.5–5.2)
PROT SERPL-MCNC: 6.9 G/DL (ref 6–8.5)
RBC # BLD AUTO: 4.52 10*6/MM3 (ref 4.2–5.4)
SODIUM SERPL-SCNC: 142 MMOL/L (ref 136–145)
T4 FREE SERPL-MCNC: 1.36 NG/DL (ref 0.93–1.7)
TRIGL SERPL-MCNC: 153 MG/DL (ref 0–150)
TSH SERPL DL<=0.005 MIU/L-ACNC: 1.07 MIU/ML (ref 0.27–4.2)
VLDLC SERPL CALC-MCNC: 30.6 MG/DL (ref 7–27)
WBC # BLD AUTO: 7.8 10*3/MM3 (ref 4.8–10.8)

## 2018-09-17 ENCOUNTER — TELEPHONE (OUTPATIENT)
Dept: INTERNAL MEDICINE | Facility: CLINIC | Age: 58
End: 2018-09-17

## 2018-09-17 NOTE — TELEPHONE ENCOUNTER
Patient notified, she has a questions about one of her medications, will send Dr. Duckworth a message and then return call to patient.   ----- Message from Aura Duckworth MD sent at 9/17/2018  8:31 AM EDT -----  Call pt about labs.  a1c up to 7.9.  Need to watch glucoses very closely and work on lower carbs totals in diet.   Recheck in 3 mo.

## 2018-09-18 ENCOUNTER — TELEPHONE (OUTPATIENT)
Dept: INTERNAL MEDICINE | Facility: CLINIC | Age: 58
End: 2018-09-18

## 2018-09-18 NOTE — TELEPHONE ENCOUNTER
Left message for pt      ----- Message from Aura Duckworth MD sent at 2018  8:46 AM EDT -----  Regarding: RE: CONCERN ABOUT BP MED  Contact: [phone]  She can split it up if she wants to    ----- Message -----  From: Lena Barahona MA  Sent: 2018   4:42 PM  To: Aura Duckworth MD  Subject: FW: CONCERN ABOUT BP MED                          PT  WANTING TO KNOW IF SHE CAN TAKE   20MG BID,  AFRAID TO TAKE   40MG  ALL AT ONE TIME  ----- Message -----  From: Aura Duckworth MD  Sent: 2018   3:52 PM  To: Lena Barahona MA  Subject: RE: CONCERN ABOUT BP MED                         Yes.  Dose went from 20 mg /day  To 40mg/ day bc pt's bp high.   Try new dose then recheck in 4 weeks.     ----- Message -----  From: Lena Barahona MA  Sent: 2018   2:32 PM  To: Aura Duckworth MD  Subject: FW: CONCERN ABOUT BP MED                             ----- Message -----  From: Jeni Ewing  Sent: 2018   2:11 PM  To: Agust[facility] Duckworth Clinical Pool  Subject: CONCERN ABOUT BP MED                             :  60  IZABEL PT.    PATIENT IS CONCERNED THAT SHE IS SUPPOSE TO START TAKING 40 MG OF LISINOPRIL ONCE A DAY. PREVIOUSLY, SHE TOOK 10 MG 2X DAY. WHAT SHOULD SHE DO?

## 2018-11-17 ENCOUNTER — APPOINTMENT (OUTPATIENT)
Dept: GENERAL RADIOLOGY | Facility: HOSPITAL | Age: 58
End: 2018-11-17

## 2018-11-17 ENCOUNTER — HOSPITAL ENCOUNTER (EMERGENCY)
Facility: HOSPITAL | Age: 58
Discharge: HOME | End: 2018-11-17
Attending: EMERGENCY MEDICINE

## 2018-11-17 VITALS
BODY MASS INDEX: 30.31 KG/M2 | TEMPERATURE: 97.7 F | HEART RATE: 65 BPM | HEIGHT: 68 IN | RESPIRATION RATE: 16 BRPM | SYSTOLIC BLOOD PRESSURE: 137 MMHG | WEIGHT: 200 LBS | DIASTOLIC BLOOD PRESSURE: 79 MMHG | OXYGEN SATURATION: 97 %

## 2018-11-17 DIAGNOSIS — S53.402A ELBOW SPRAIN, LEFT, INITIAL ENCOUNTER: Primary | ICD-10-CM

## 2018-11-17 PROCEDURE — 99282 EMERGENCY DEPT VISIT SF MDM: CPT | Performed by: EMERGENCY MEDICINE

## 2018-11-17 PROCEDURE — 99283 EMERGENCY DEPT VISIT LOW MDM: CPT

## 2018-11-17 PROCEDURE — 73080 X-RAY EXAM OF ELBOW: CPT

## 2018-11-17 RX ORDER — NAPROXEN 250 MG/1
500 TABLET ORAL ONCE
Status: COMPLETED | OUTPATIENT
Start: 2018-11-17 | End: 2018-11-17

## 2018-11-17 RX ORDER — NAPROXEN 500 MG/1
500 TABLET ORAL 2 TIMES DAILY PRN
Qty: 30 TABLET | Refills: 0 | Status: SHIPPED | OUTPATIENT
Start: 2018-11-17 | End: 2019-04-23

## 2018-11-17 RX ADMIN — NAPROXEN 500 MG: 250 TABLET ORAL at 20:19

## 2018-11-17 NOTE — ED PROVIDER NOTES
Subjective   History of Present Illness  History of Present Illness    Chief complaint: Elbow pain    Location: Left elbow    Quality/Severity:  Moderate, sharp    Timing/Onset/Duration: Acute onset while working on a craft today    Modifying Factors: Hurts to move the forearm, feels better to remain still    Associated Symptoms: No numbness, tingling, or weakness but no fever, no chills.  The lacerations, abrasions, or contusions    Narrative:  This 58-year-old white female was doing a craft and holding onto the craft with the left hand and all of a sudden began experiencing left elbow pain.       Review of Systems   Constitutional: Negative for chills and fever.   Musculoskeletal:        Left elbow pain        Medication List      ASK your doctor about these medications    acetaminophen 500 MG tablet  Commonly known as:  TYLENOL     aspirin 81 MG tablet     atorvastatin 40 MG tablet  Commonly known as:  LIPITOR  Take 1 tablet by mouth Every Night.     carvedilol 3.125 MG tablet  Commonly known as:  COREG  TAKE ONE PO BID     cyclobenzaprine 10 MG tablet  Commonly known as:  FLEXERIL  Take 1 tablet by mouth 3 (Three) Times a Day As Needed for Muscle Spasms.     insulin  UNIT/ML injection  Commonly known as:  humuLIN N,novoLIN N     insulin regular 100 UNIT/ML injection  Commonly known as:  humuLIN R,novoLIN R     lisinopril 40 MG tablet  Commonly known as:  PRINIVIL,ZESTRIL  Take 1 tablet by mouth Daily.     meclizine 25 MG tablet  Commonly known as:  ANTIVERT  Take 1 tablet by mouth 3 (Three) Times a Day As Needed for dizziness or   Nausea for up to 30 doses.     melatonin 5 MG tablet tablet     meloxicam 7.5 MG tablet  Commonly known as:  MOBIC  Take 1 tablet by mouth Daily As Needed for Moderate Pain .     metaxalone 800 MG tablet  Commonly known as:  SKELAXIN  Take 1 tablet by mouth 4 (Four) Times a Day As Needed for Muscle Spasms   for up to 24 doses.     ondansetron ODT 4 MG disintegrating  tablet  Commonly known as:  ZOFRAN ODT  Take 1 tablet by mouth Every 6 (Six) Hours As Needed for Nausea or   Vomiting for up to 30 doses.     pantoprazole 40 MG EC tablet  Commonly known as:  PROTONIX  Take 1 tablet by mouth Daily.     Scopolamine 1.5 MG/3DAYS patch  Commonly known as:  TRANSDERM-SCOP  Place 1 patch on the skin Every 72 (Seventy-Two) Hours.     traMADol 50 MG tablet  Commonly known as:  ULTRAM  1 - 2 tablets po q 4 hours PRN sever pain     WAVESENSE PRESTO test strip  Generic drug:  glucose blood  USE ONE STRIP TO TEST FOUR TIMES A DAY          Past Medical History:   Diagnosis Date   • AC (acromioclavicular) joint bone spurs    • Diabetes mellitus (CMS/HCC)    • Herpes zoster    • Hyperlipidemia    • Hypertension    • Myocardial infarction        No Known Allergies    Past Surgical History:   Procedure Laterality Date   • BREAST LUMPECTOMY     • BREAST LUMPECTOMY      for benign lump   • CARDIAC SURGERY  2014   •  SECTION     • CORONARY ARTERY BYPASS GRAFT     • KNEE SURGERY         Family History   Problem Relation Age of Onset   • Hypertension Father    • Diabetes Father    • Glaucoma Father    • Hypotension Father    • Kidney failure Father    • Anxiety disorder Father    • Hypertension Sister    • Diabetes Sister    • Anxiety disorder Sister    • Osteoporosis Mother    • Cataracts Mother    • Heart failure Mother        Social History     Socioeconomic History   • Marital status: Legally      Spouse name: Not on file   • Number of children: Not on file   • Years of education: Not on file   • Highest education level: Not on file   Tobacco Use   • Smoking status: Former Smoker     Packs/day: 1.00     Years: 15.00     Pack years: 15.00     Last attempt to quit: 2014     Years since quittin.3   • Smokeless tobacco: Never Used   Substance and Sexual Activity   • Alcohol use: Yes   • Drug use: No           Objective   Physical Exam   Constitutional: She appears  well-developed and well-nourished. No distress.   ED Triage Vitals (11/17/18 1738)  Temp: 97.7 °F (36.5 °C)  Heart Rate: 75  Resp: 18  BP: 165/96  SpO2: 99 %  Temp src: n/a  Heart Rate Source: Monitor  Patient Position: Sitting  BP Location: Right arm  FiO2 (%): n/a    The patient's vitals were reviewed by me.  Unless otherwise noted they are within normal limits.  Patient is hypertensive with blood pressure 165/96.  She has a history of hypertension and is in a moderate amount of pain.     Musculoskeletal:   There is moderate tenderness upon palpation of the left olecranon.  There is mild swelling.  The capillary refill is less than 2 seconds.  The sensation is intact.  There is a normal range of motion noted.  There is no joint laxity noted.  The pain seems to be most intense upon extension of the forearm.  There is a 2+ radioulnar pulse.   Skin: She is not diaphoretic.   Nursing note and vitals reviewed.      Procedures           ED Course      8:02 PM, 11/17/18:  The patient's diagnosis of left elbow sprain was discussed with her.  The patient should ice the left elbow for 20 minutes every 2 hours but she is awake for 2-3 days.  She should elevate the elbow.  She should wear the sling as needed as directed for comfort.  The patient should follow-up with Dr. Houston in one week.  She should return to emergency department if there is increasing pain, numbness, tingling, weakness, change in color temperature, worse in any way at all.  All the patient's questions were answered,  she will be discharged in good condition.    8:03 PM, 11/17/18:  Patient had a sling applied to the left arm.  This is probably the technician.  After application the sling it was noted to be in good position with the left upper extremity being neurovascularly intact.  This was assessed by me.            [facility]    No orders to display     Labs Reviewed - No data to display  No results found.    Final diagnoses:   Elbow sprain, left, initial  encounter         ED Medications:  Medications - No data to display    New Medications:     Medication List      ASK your doctor about these medications    acetaminophen 500 MG tablet  Commonly known as:  TYLENOL     aspirin 81 MG tablet     atorvastatin 40 MG tablet  Commonly known as:  LIPITOR  Take 1 tablet by mouth Every Night.     carvedilol 3.125 MG tablet  Commonly known as:  COREG  TAKE ONE PO BID     cyclobenzaprine 10 MG tablet  Commonly known as:  FLEXERIL  Take 1 tablet by mouth 3 (Three) Times a Day As Needed for Muscle Spasms.     insulin  UNIT/ML injection  Commonly known as:  humuLIN N,novoLIN N     insulin regular 100 UNIT/ML injection  Commonly known as:  humuLIN R,novoLIN R     lisinopril 40 MG tablet  Commonly known as:  PRINIVIL,ZESTRIL  Take 1 tablet by mouth Daily.     meclizine 25 MG tablet  Commonly known as:  ANTIVERT  Take 1 tablet by mouth 3 (Three) Times a Day As Needed for dizziness or   Nausea for up to 30 doses.     melatonin 5 MG tablet tablet     meloxicam 7.5 MG tablet  Commonly known as:  MOBIC  Take 1 tablet by mouth Daily As Needed for Moderate Pain .     metaxalone 800 MG tablet  Commonly known as:  SKELAXIN  Take 1 tablet by mouth 4 (Four) Times a Day As Needed for Muscle Spasms   for up to 24 doses.     ondansetron ODT 4 MG disintegrating tablet  Commonly known as:  ZOFRAN ODT  Take 1 tablet by mouth Every 6 (Six) Hours As Needed for Nausea or   Vomiting for up to 30 doses.     pantoprazole 40 MG EC tablet  Commonly known as:  PROTONIX  Take 1 tablet by mouth Daily.     Scopolamine 1.5 MG/3DAYS patch  Commonly known as:  TRANSDERM-SCOP  Place 1 patch on the skin Every 72 (Seventy-Two) Hours.     traMADol 50 MG tablet  Commonly known as:  ULTRAM  1 - 2 tablets po q 4 hours PRN sever pain     WAVESENSE PRESTO test strip  Generic drug:  glucose blood  USE ONE STRIP TO TEST FOUR TIMES A DAY          Stopped Medications:     Medication List      ASK your doctor about these  medications    acetaminophen 500 MG tablet  Commonly known as:  TYLENOL     aspirin 81 MG tablet     atorvastatin 40 MG tablet  Commonly known as:  LIPITOR  Take 1 tablet by mouth Every Night.     carvedilol 3.125 MG tablet  Commonly known as:  COREG  TAKE ONE PO BID     cyclobenzaprine 10 MG tablet  Commonly known as:  FLEXERIL  Take 1 tablet by mouth 3 (Three) Times a Day As Needed for Muscle Spasms.     insulin  UNIT/ML injection  Commonly known as:  humuLIN N,novoLIN N     insulin regular 100 UNIT/ML injection  Commonly known as:  humuLIN R,novoLIN R     lisinopril 40 MG tablet  Commonly known as:  PRINIVIL,ZESTRIL  Take 1 tablet by mouth Daily.     meclizine 25 MG tablet  Commonly known as:  ANTIVERT  Take 1 tablet by mouth 3 (Three) Times a Day As Needed for dizziness or   Nausea for up to 30 doses.     melatonin 5 MG tablet tablet     meloxicam 7.5 MG tablet  Commonly known as:  MOBIC  Take 1 tablet by mouth Daily As Needed for Moderate Pain .     metaxalone 800 MG tablet  Commonly known as:  SKELAXIN  Take 1 tablet by mouth 4 (Four) Times a Day As Needed for Muscle Spasms   for up to 24 doses.     ondansetron ODT 4 MG disintegrating tablet  Commonly known as:  ZOFRAN ODT  Take 1 tablet by mouth Every 6 (Six) Hours As Needed for Nausea or   Vomiting for up to 30 doses.     pantoprazole 40 MG EC tablet  Commonly known as:  PROTONIX  Take 1 tablet by mouth Daily.     Scopolamine 1.5 MG/3DAYS patch  Commonly known as:  TRANSDERM-SCOP  Place 1 patch on the skin Every 72 (Seventy-Two) Hours.     traMADol 50 MG tablet  Commonly known as:  ULTRAM  1 - 2 tablets po q 4 hours PRN sever pain     WAVESENSE PRESTO test strip  Generic drug:  glucose blood  USE ONE STRIP TO TEST FOUR TIMES A DAY            Final diagnoses:   Elbow sprain, left, initial encounter            Flavio Hansen MD  11/17/18 2006

## 2018-11-18 NOTE — DISCHARGE INSTRUCTIONS
Follow-up with Dr. Houston in one week.  Return to emergency department if there is increasing pain, numbness, tingling, weakness, change in her temperature, worse in anyway at all.

## 2018-12-06 DIAGNOSIS — I10 ESSENTIAL HYPERTENSION: ICD-10-CM

## 2018-12-06 DIAGNOSIS — E13.9 LATENT AUTOIMMUNE DIABETES IN ADULTS (LADA), MANAGED AS TYPE 1 (HCC): ICD-10-CM

## 2018-12-06 DIAGNOSIS — E78.5 HYPERLIPIDEMIA, UNSPECIFIED HYPERLIPIDEMIA TYPE: Primary | ICD-10-CM

## 2018-12-15 ENCOUNTER — RESULTS ENCOUNTER (OUTPATIENT)
Dept: INTERNAL MEDICINE | Facility: CLINIC | Age: 58
End: 2018-12-15

## 2018-12-15 DIAGNOSIS — E78.5 HYPERLIPIDEMIA, UNSPECIFIED HYPERLIPIDEMIA TYPE: ICD-10-CM

## 2018-12-15 DIAGNOSIS — E13.9 LATENT AUTOIMMUNE DIABETES IN ADULTS (LADA), MANAGED AS TYPE 1 (HCC): ICD-10-CM

## 2018-12-15 DIAGNOSIS — I10 ESSENTIAL HYPERTENSION: ICD-10-CM

## 2018-12-22 DIAGNOSIS — I10 ESSENTIAL HYPERTENSION: ICD-10-CM

## 2018-12-24 RX ORDER — LISINOPRIL 40 MG/1
TABLET ORAL
Qty: 90 TABLET | Refills: 0 | Status: SHIPPED | OUTPATIENT
Start: 2018-12-24 | End: 2019-03-19 | Stop reason: SDUPTHER

## 2019-02-28 DIAGNOSIS — I25.10 CHRONIC CORONARY ARTERY DISEASE: ICD-10-CM

## 2019-02-28 DIAGNOSIS — E78.5 HYPERLIPIDEMIA, UNSPECIFIED HYPERLIPIDEMIA TYPE: ICD-10-CM

## 2019-02-28 DIAGNOSIS — E13.9 LATENT AUTOIMMUNE DIABETES IN ADULTS (LADA), MANAGED AS TYPE 1 (HCC): ICD-10-CM

## 2019-02-28 RX ORDER — ATORVASTATIN CALCIUM 40 MG/1
40 TABLET, FILM COATED ORAL NIGHTLY
Qty: 90 TABLET | Refills: 1 | Status: SHIPPED | OUTPATIENT
Start: 2019-02-28 | End: 2019-09-13 | Stop reason: SDUPTHER

## 2019-03-19 DIAGNOSIS — I10 ESSENTIAL HYPERTENSION: ICD-10-CM

## 2019-03-19 DIAGNOSIS — I25.10 CHRONIC CORONARY ARTERY DISEASE: ICD-10-CM

## 2019-03-19 RX ORDER — CARVEDILOL 3.12 MG/1
TABLET ORAL
Qty: 180 TABLET | Refills: 1 | Status: SHIPPED | OUTPATIENT
Start: 2019-03-19 | End: 2020-02-05

## 2019-03-19 RX ORDER — LISINOPRIL 40 MG/1
TABLET ORAL
Qty: 90 TABLET | Refills: 1 | Status: SHIPPED | OUTPATIENT
Start: 2019-03-19 | End: 2019-08-14

## 2019-04-11 LAB
ALBUMIN SERPL-MCNC: 4.7 G/DL (ref 3.5–5.2)
ALBUMIN/GLOB SERPL: 2 G/DL
ALP SERPL-CCNC: 100 U/L (ref 39–117)
ALT SERPL-CCNC: 18 U/L (ref 1–33)
AST SERPL-CCNC: 15 U/L (ref 1–32)
BASOPHILS # BLD AUTO: 0.03 10*3/MM3 (ref 0–0.2)
BASOPHILS NFR BLD AUTO: 0.4 % (ref 0–1.5)
BILIRUB SERPL-MCNC: 0.2 MG/DL (ref 0.2–1.2)
BUN SERPL-MCNC: 17 MG/DL (ref 6–20)
BUN/CREAT SERPL: 21.3 (ref 7–25)
CALCIUM SERPL-MCNC: 9.7 MG/DL (ref 8.6–10.5)
CHLORIDE SERPL-SCNC: 102 MMOL/L (ref 98–107)
CHOLEST SERPL-MCNC: 155 MG/DL (ref 0–200)
CO2 SERPL-SCNC: 26.6 MMOL/L (ref 22–29)
CREAT SERPL-MCNC: 0.8 MG/DL (ref 0.57–1)
EOSINOPHIL # BLD AUTO: 0.35 10*3/MM3 (ref 0–0.4)
EOSINOPHIL NFR BLD AUTO: 4.5 % (ref 0.3–6.2)
ERYTHROCYTE [DISTWIDTH] IN BLOOD BY AUTOMATED COUNT: 14.3 % (ref 12.3–15.4)
GLOBULIN SER CALC-MCNC: 2.3 GM/DL
GLUCOSE SERPL-MCNC: 152 MG/DL (ref 65–99)
HBA1C MFR BLD: 8.28 % (ref 4.8–5.6)
HCT VFR BLD AUTO: 41.5 % (ref 34–46.6)
HDLC SERPL-MCNC: 51 MG/DL (ref 40–60)
HGB BLD-MCNC: 12.8 G/DL (ref 12–15.9)
IMM GRANULOCYTES # BLD AUTO: 0.03 10*3/MM3 (ref 0–0.05)
IMM GRANULOCYTES NFR BLD AUTO: 0.4 % (ref 0–0.5)
LDLC SERPL CALC-MCNC: 79 MG/DL (ref 0–100)
LDLC/HDLC SERPL: 1.54 {RATIO}
LYMPHOCYTES # BLD AUTO: 3.13 10*3/MM3 (ref 0.7–3.1)
LYMPHOCYTES NFR BLD AUTO: 39.9 % (ref 19.6–45.3)
MCH RBC QN AUTO: 26.9 PG (ref 26.6–33)
MCHC RBC AUTO-ENTMCNC: 30.8 G/DL (ref 31.5–35.7)
MCV RBC AUTO: 87.2 FL (ref 79–97)
MONOCYTES # BLD AUTO: 0.51 10*3/MM3 (ref 0.1–0.9)
MONOCYTES NFR BLD AUTO: 6.5 % (ref 5–12)
NEUTROPHILS # BLD AUTO: 3.79 10*3/MM3 (ref 1.4–7)
NEUTROPHILS NFR BLD AUTO: 48.3 % (ref 42.7–76)
NRBC BLD AUTO-RTO: 0 /100 WBC (ref 0–0)
PLATELET # BLD AUTO: 288 10*3/MM3 (ref 140–450)
POTASSIUM SERPL-SCNC: 4.4 MMOL/L (ref 3.5–5.2)
PROT SERPL-MCNC: 7 G/DL (ref 6–8.5)
RBC # BLD AUTO: 4.76 10*6/MM3 (ref 3.77–5.28)
SODIUM SERPL-SCNC: 142 MMOL/L (ref 136–145)
TRIGL SERPL-MCNC: 127 MG/DL (ref 0–150)
VLDLC SERPL CALC-MCNC: 25.4 MG/DL
WBC # BLD AUTO: 7.84 10*3/MM3 (ref 3.4–10.8)

## 2019-04-15 ENCOUNTER — OFFICE VISIT (OUTPATIENT)
Dept: INTERNAL MEDICINE | Facility: CLINIC | Age: 59
End: 2019-04-15

## 2019-04-15 VITALS
DIASTOLIC BLOOD PRESSURE: 88 MMHG | SYSTOLIC BLOOD PRESSURE: 148 MMHG | RESPIRATION RATE: 18 BRPM | BODY MASS INDEX: 35.01 KG/M2 | HEIGHT: 68 IN | HEART RATE: 84 BPM | WEIGHT: 231 LBS | OXYGEN SATURATION: 99 %

## 2019-04-15 DIAGNOSIS — E78.5 HYPERLIPIDEMIA, UNSPECIFIED HYPERLIPIDEMIA TYPE: ICD-10-CM

## 2019-04-15 DIAGNOSIS — K21.9 GASTROESOPHAGEAL REFLUX DISEASE, ESOPHAGITIS PRESENCE NOT SPECIFIED: ICD-10-CM

## 2019-04-15 DIAGNOSIS — I10 ESSENTIAL HYPERTENSION: ICD-10-CM

## 2019-04-15 DIAGNOSIS — G57.90 NEUROPATHY OF FOOT, UNSPECIFIED LATERALITY: ICD-10-CM

## 2019-04-15 DIAGNOSIS — I25.10 CHRONIC CORONARY ARTERY DISEASE: Primary | ICD-10-CM

## 2019-04-15 DIAGNOSIS — E13.9 LATENT AUTOIMMUNE DIABETES IN ADULTS (LADA), MANAGED AS TYPE 1 (HCC): ICD-10-CM

## 2019-04-15 PROCEDURE — 99214 OFFICE O/P EST MOD 30 MIN: CPT | Performed by: INTERNAL MEDICINE

## 2019-04-15 RX ORDER — GABAPENTIN 300 MG/1
300 CAPSULE ORAL 3 TIMES DAILY
Qty: 90 CAPSULE | Refills: 0 | Status: SHIPPED | OUTPATIENT
Start: 2019-04-15 | End: 2019-08-08 | Stop reason: ALTCHOICE

## 2019-04-15 NOTE — PATIENT INSTRUCTIONS
Begin gabapentin for foot pain.  Begin with 1 cap by mouth at night x 1 week.  1 in morning and 1 at night x 1 week then increase to 3 times a day if needed.

## 2019-04-15 NOTE — PROGRESS NOTES
Kristine Rivas is a 59 y.o. female, who presents with a chief complaint of   Chief Complaint   Patient presents with   • Hypertension   • Diabetes   • Foot Pain       HPI   Pt here for follow up.  She has uncontrolled dm and htn.  She has had a NSTEMI in the past.      She has had bilateral leg pain for a long time.  She was told that it was from statin.  She says she has been to dr. Remy (podiatry), [facility].  She has also seen dr. Travis.  No change in pain off of her statin.  She says the pain on the bottom on her feet is the most severe.  She knows it isnt plantar fasciitis.  She is upset bc she cant stand on her feet for a long time.  She says the pain is constant.  The pain begins in the bottom of her foot and goes up.  On the left side the pain goes all the way up her buttock.  She is working as a  when she is able.  She is asking about how to get disability.     DM - uncontrolled.  a1c 8.28,  She gets insulin otc bc she can't afford what her insurance covers.       HTN - on carvedilol, lisinopril.  She denies ha/dizziness.  bp is uncontrolled.       gerd - on PPI.  Sx controlled as log as she is on med.       CAD s/p cabg - pt denies chest pain, soa.  She has had some LE edema.       HLD - pt on statin.  No cramps or myalgias.        The following portions of the patient's history were reviewed and updated as appropriate: allergies, current medications, past family history, past medical history, past social history, past surgical history and problem list.    Allergies: Patient has no known allergies.    Review of Systems   Constitutional: Negative.    HENT: Negative.    Eyes: Negative.    Respiratory: Negative.    Cardiovascular: Negative.    Gastrointestinal: Negative.    Endocrine: Negative.    Genitourinary: Negative.    Musculoskeletal: Negative.         Foot pain   Skin: Negative.    Allergic/Immunologic: Negative.    Neurological: Negative.    Hematological:  Negative.    Psychiatric/Behavioral: Negative.    All other systems reviewed and are negative.            Wt Readings from Last 3 Encounters:   04/15/19 105 kg (231 lb)   11/17/18 90.7 kg (200 lb)   09/13/18 103 kg (226 lb 3.2 oz)     Temp Readings from Last 3 Encounters:   11/17/18 97.7 °F (36.5 °C) (Oral)   08/14/18 98 °F (36.7 °C) (Oral)   02/10/18 98 °F (36.7 °C) (Oral)     BP Readings from Last 3 Encounters:   04/15/19 148/88   11/17/18 137/79   09/13/18 146/90     Pulse Readings from Last 3 Encounters:   04/15/19 84   11/17/18 65   09/13/18 64     Body mass index is 35.12 kg/m².  @LASTSAO2(3)@    Physical Exam   Constitutional: She is oriented to person, place, and time. She appears well-developed and well-nourished.   Gets up and down multiple times during exam trying to get feet/legs comfortable.   HENT:   Head: Normocephalic and atraumatic.   Right Ear: External ear normal.   Left Ear: External ear normal.   Nose: Nose normal.   Mouth/Throat: Oropharynx is clear and moist.   Eyes: Conjunctivae and EOM are normal. Pupils are equal, round, and reactive to light.   Neck: Normal range of motion. Neck supple.   Cardiovascular: Normal rate, regular rhythm, normal heart sounds and intact distal pulses.   Pulmonary/Chest: Effort normal and breath sounds normal. No respiratory distress. She has no wheezes.   Musculoskeletal: Normal range of motion.   Antalgic gait  Pain with palpation of post left heel.   Neurological: She is alert and oriented to person, place, and time.   Skin: Skin is warm and dry.   Psychiatric: She has a normal mood and affect. Her behavior is normal. Judgment and thought content normal.   Nursing note and vitals reviewed.      Results for orders placed or performed in visit on 12/15/18   Comprehensive metabolic panel   Result Value Ref Range    Glucose 152 (H) 65 - 99 mg/dL    BUN 17 6 - 20 mg/dL    Creatinine 0.80 0.57 - 1.00 mg/dL    eGFR Non African Am 73 >60 mL/min/1.73    eGFR African Am  89 >60 mL/min/1.73    BUN/Creatinine Ratio 21.3 7.0 - 25.0    Sodium 142 136 - 145 mmol/L    Potassium 4.4 3.5 - 5.2 mmol/L    Chloride 102 98 - 107 mmol/L    Total CO2 26.6 22.0 - 29.0 mmol/L    Calcium 9.7 8.6 - 10.5 mg/dL    Total Protein 7.0 6.0 - 8.5 g/dL    Albumin 4.70 3.50 - 5.20 g/dL    Globulin 2.3 gm/dL    A/G Ratio 2.0 g/dL    Total Bilirubin 0.2 0.2 - 1.2 mg/dL    Alkaline Phosphatase 100 39 - 117 U/L    AST (SGOT) 15 1 - 32 U/L    ALT (SGPT) 18 1 - 33 U/L   Lipid Panel With LDL/HDL Ratio   Result Value Ref Range    Total Cholesterol 155 0 - 200 mg/dL    Triglycerides 127 0 - 150 mg/dL    HDL Cholesterol 51 40 - 60 mg/dL    VLDL Cholesterol 25.4 mg/dL    LDL Cholesterol  79 0 - 100 mg/dL    LDL/HDL Ratio 1.54    Hemoglobin A1c   Result Value Ref Range    Hemoglobin A1C 8.28 (H) 4.80 - 5.60 %   CBC & Differential   Result Value Ref Range    WBC 7.84 3.40 - 10.80 10*3/mm3    RBC 4.76 3.77 - 5.28 10*6/mm3    Hemoglobin 12.8 12.0 - 15.9 g/dL    Hematocrit 41.5 34.0 - 46.6 %    MCV 87.2 79.0 - 97.0 fL    MCH 26.9 26.6 - 33.0 pg    MCHC 30.8 (L) 31.5 - 35.7 g/dL    RDW 14.3 12.3 - 15.4 %    Platelets 288 140 - 450 10*3/mm3    Neutrophil Rel % 48.3 42.7 - 76.0 %    Lymphocyte Rel % 39.9 19.6 - 45.3 %    Monocyte Rel % 6.5 5.0 - 12.0 %    Eosinophil Rel % 4.5 0.3 - 6.2 %    Basophil Rel % 0.4 0.0 - 1.5 %    Neutrophils Absolute 3.79 1.40 - 7.00 10*3/mm3    Lymphocytes Absolute 3.13 (H) 0.70 - 3.10 10*3/mm3    Monocytes Absolute 0.51 0.10 - 0.90 10*3/mm3    Eosinophils Absolute 0.35 0.00 - 0.40 10*3/mm3    Basophils Absolute 0.03 0.00 - 0.20 10*3/mm3    Immature Granulocyte Rel % 0.4 0.0 - 0.5 %    Immature Grans Absolute 0.03 0.00 - 0.05 10*3/mm3    nRBC 0.0 0.0 - 0.0 /100 WBC           Kristine was seen today for hypertension, diabetes and foot pain.    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    Gastroesophageal reflux disease, esophagitis presence not specified      Begin gabapentin for foot pain.  Begin with 1 cap by mouth at night x 1 week.  1 in morning and 1 at night x 1 week then increase to 3 times a day if needed.  Discussed controlled substance agreement with patient.  Check erlin.  Recheck in 4 weeks.      Outpatient Medications Prior to Visit   Medication Sig Dispense Refill   • acetaminophen (TYLENOL) 500 MG tablet Take 1,000 mg by mouth 2 (Two) Times a Day.     • aspirin 81 MG tablet Take 1 tablet by mouth daily.     • atorvastatin (LIPITOR) 40 MG tablet Take 1 tablet by mouth Every Night. 90 tablet 1   • carvedilol (COREG) 3.125 MG tablet TAKE ONE TABLET BY MOUTH TWICE A  tablet 1   • cyclobenzaprine (FLEXERIL) 10 MG tablet Take 1 tablet by mouth 3 (Three) Times a Day As Needed for Muscle Spasms. 30 tablet 0   • insulin NPH (humuLIN N,novoLIN N) 100 UNIT/ML injection Inject 32 Units under the skin Every Night.     • insulin regular (humuLIN R,novoLIN R) 100 UNIT/ML injection Inject 15 Units under the skin 3 (Three) Times a Day Before Meals. 15-20 units at breakfast   15-20 units at lunch   25-45 units at dinner     • lisinopril (PRINIVIL,ZESTRIL) 40 MG tablet TAKE ONE TABLET BY MOUTH DAILY 90 tablet 1   • meclizine (ANTIVERT) 25 MG tablet Take 1 tablet by mouth 3 (Three) Times a Day As Needed for dizziness or Nausea for up to 30 doses. 30 tablet 0   • melatonin 5 MG tablet tablet Take 5 mg by mouth.     • metaxalone (SKELAXIN) 800 MG tablet Take 1 tablet by mouth 4 (Four) Times a Day As Needed for Muscle Spasms for up to 24 doses. 24 tablet 0   • naproxen (NAPROSYN) 500 MG tablet Take 1 tablet by mouth 2 (Two) Times a Day As Needed for Moderate Pain . 30 tablet 0   •  ondansetron ODT (ZOFRAN ODT) 4 MG disintegrating tablet Take 1 tablet by mouth Every 6 (Six) Hours As Needed for Nausea or Vomiting for up to 30 doses. 30 tablet 0   • pantoprazole (PROTONIX) 40 MG EC tablet Take 1 tablet by mouth Daily. 90 tablet 1   • Scopolamine (TRANSDERM-SCOP) 1.5 MG/3DAYS patch Place 1 patch on the skin Every 72 (Seventy-Two) Hours. 4 patch 0   • WAVESENSE PRESTO test strip USE ONE STRIP TO TEST FOUR TIMES A  each 4   • traMADol (ULTRAM) 50 MG tablet 1 - 2 tablets po q 4 hours PRN sever pain 24 tablet 0     No facility-administered medications prior to visit.      New Medications Ordered This Visit   Medications   • gabapentin (NEURONTIN) 300 MG capsule     Sig: Take 1 capsule by mouth 3 (Three) Times a Day.     Dispense:  90 capsule     Refill:  0     [unfilled]  Medications Discontinued During This Encounter   Medication Reason   • traMADol (ULTRAM) 50 MG tablet *Therapy completed         Return in about 4 weeks (around 5/13/2019) for Recheck.

## 2019-04-23 ENCOUNTER — APPOINTMENT (OUTPATIENT)
Dept: CT IMAGING | Facility: HOSPITAL | Age: 59
End: 2019-04-23

## 2019-04-23 ENCOUNTER — HOSPITAL ENCOUNTER (EMERGENCY)
Facility: HOSPITAL | Age: 59
Discharge: HOME | End: 2019-04-23
Attending: EMERGENCY MEDICINE | Admitting: EMERGENCY MEDICINE

## 2019-04-23 VITALS
SYSTOLIC BLOOD PRESSURE: 129 MMHG | OXYGEN SATURATION: 97 % | TEMPERATURE: 97.9 F | HEART RATE: 70 BPM | HEIGHT: 68 IN | BODY MASS INDEX: 30.31 KG/M2 | WEIGHT: 200 LBS | RESPIRATION RATE: 16 BRPM | DIASTOLIC BLOOD PRESSURE: 79 MMHG

## 2019-04-23 DIAGNOSIS — K52.9 COLITIS: Primary | ICD-10-CM

## 2019-04-23 DIAGNOSIS — K62.5 RECTAL BLEEDING: ICD-10-CM

## 2019-04-23 LAB
ABO GROUP BLD: NORMAL
ABO GROUP BLD: NORMAL
ALBUMIN SERPL-MCNC: 4.4 G/DL (ref 3.5–5.2)
ALBUMIN/GLOB SERPL: 1.6 G/DL
ALP SERPL-CCNC: 96 U/L (ref 39–117)
ALT SERPL W P-5'-P-CCNC: 17 U/L (ref 1–33)
ANION GAP SERPL CALCULATED.3IONS-SCNC: 10.5 MMOL/L
APTT PPP: 29.8 SECONDS (ref 24.3–38.1)
AST SERPL-CCNC: 14 U/L (ref 1–32)
BASOPHILS # BLD AUTO: 0.03 10*3/MM3 (ref 0–0.2)
BASOPHILS NFR BLD AUTO: 0.3 % (ref 0–1.5)
BILIRUB SERPL-MCNC: 0.3 MG/DL (ref 0.2–1.2)
BILIRUB UR QL STRIP: NEGATIVE
BLD GP AB SCN SERPL QL: NEGATIVE
BUN BLD-MCNC: 11 MG/DL (ref 6–20)
BUN/CREAT SERPL: 15.3 (ref 7–25)
CALCIUM SPEC-SCNC: 8.9 MG/DL (ref 8.6–10.5)
CHLORIDE SERPL-SCNC: 102 MMOL/L (ref 98–107)
CLARITY UR: CLEAR
CO2 SERPL-SCNC: 24.5 MMOL/L (ref 22–29)
COLOR UR: YELLOW
CREAT BLD-MCNC: 0.72 MG/DL (ref 0.57–1)
D-LACTATE SERPL-SCNC: 1 MMOL/L (ref 0.5–2)
DEPRECATED RDW RBC AUTO: 44.4 FL (ref 37–54)
EOSINOPHIL # BLD AUTO: 0.34 10*3/MM3 (ref 0–0.4)
EOSINOPHIL NFR BLD AUTO: 3.1 % (ref 0.3–6.2)
ERYTHROCYTE [DISTWIDTH] IN BLOOD BY AUTOMATED COUNT: 14.5 % (ref 12.3–15.4)
GFR SERPL CREATININE-BSD FRML MDRD: 83 ML/MIN/1.73
GLOBULIN UR ELPH-MCNC: 2.8 GM/DL
GLUCOSE BLD-MCNC: 173 MG/DL (ref 65–99)
GLUCOSE BLDC GLUCOMTR-MCNC: 176 MG/DL (ref 70–130)
GLUCOSE UR STRIP-MCNC: NEGATIVE MG/DL
HCT VFR BLD AUTO: 40.5 % (ref 34–46.6)
HGB BLD-MCNC: 12.7 G/DL (ref 12–15.9)
HGB UR QL STRIP.AUTO: NEGATIVE
IMM GRANULOCYTES # BLD AUTO: 0.02 10*3/MM3 (ref 0–0.05)
IMM GRANULOCYTES NFR BLD AUTO: 0.2 % (ref 0–0.5)
INR PPP: 0.97 (ref 0.9–1.1)
KETONES UR QL STRIP: NEGATIVE
LEUKOCYTE ESTERASE UR QL STRIP.AUTO: NEGATIVE
LYMPHOCYTES # BLD AUTO: 3.02 10*3/MM3 (ref 0.7–3.1)
LYMPHOCYTES NFR BLD AUTO: 27.7 % (ref 19.6–45.3)
MCH RBC QN AUTO: 26.7 PG (ref 26.6–33)
MCHC RBC AUTO-ENTMCNC: 31.4 G/DL (ref 31.5–35.7)
MCV RBC AUTO: 85.3 FL (ref 79–97)
MONOCYTES # BLD AUTO: 0.58 10*3/MM3 (ref 0.1–0.9)
MONOCYTES NFR BLD AUTO: 5.3 % (ref 5–12)
NEUTROPHILS # BLD AUTO: 6.91 10*3/MM3 (ref 1.7–7)
NEUTROPHILS NFR BLD AUTO: 63.4 % (ref 42.7–76)
NITRITE UR QL STRIP: NEGATIVE
NRBC BLD AUTO-RTO: 0 /100 WBC (ref 0–0.2)
PH UR STRIP.AUTO: 5.5 [PH] (ref 4.5–8)
PLATELET # BLD AUTO: 254 10*3/MM3 (ref 140–450)
PMV BLD AUTO: 11.3 FL (ref 6–12)
POTASSIUM BLD-SCNC: 3.9 MMOL/L (ref 3.5–5.2)
PROT SERPL-MCNC: 7.2 G/DL (ref 6–8.5)
PROT UR QL STRIP: NEGATIVE
PROTHROMBIN TIME: 12.6 SECONDS (ref 12.1–15)
RBC # BLD AUTO: 4.75 10*6/MM3 (ref 3.77–5.28)
RH BLD: POSITIVE
RH BLD: POSITIVE
SODIUM BLD-SCNC: 137 MMOL/L (ref 136–145)
SP GR UR STRIP: <=1.005 (ref 1–1.03)
T&S EXPIRATION DATE: NORMAL
UROBILINOGEN UR QL STRIP: NORMAL
WBC NRBC COR # BLD: 10.9 10*3/MM3 (ref 3.4–10.8)

## 2019-04-23 PROCEDURE — 87507 IADNA-DNA/RNA PROBE TQ 12-25: CPT | Performed by: PHYSICIAN ASSISTANT

## 2019-04-23 PROCEDURE — 99284 EMERGENCY DEPT VISIT MOD MDM: CPT

## 2019-04-23 PROCEDURE — 86900 BLOOD TYPING SEROLOGIC ABO: CPT

## 2019-04-23 PROCEDURE — 86901 BLOOD TYPING SEROLOGIC RH(D): CPT

## 2019-04-23 PROCEDURE — 74177 CT ABD & PELVIS W/CONTRAST: CPT

## 2019-04-23 PROCEDURE — 80053 COMPREHEN METABOLIC PANEL: CPT | Performed by: PHYSICIAN ASSISTANT

## 2019-04-23 PROCEDURE — 85025 COMPLETE CBC W/AUTO DIFF WBC: CPT | Performed by: PHYSICIAN ASSISTANT

## 2019-04-23 PROCEDURE — 99284 EMERGENCY DEPT VISIT MOD MDM: CPT | Performed by: PHYSICIAN ASSISTANT

## 2019-04-23 PROCEDURE — 86900 BLOOD TYPING SEROLOGIC ABO: CPT | Performed by: EMERGENCY MEDICINE

## 2019-04-23 PROCEDURE — 82962 GLUCOSE BLOOD TEST: CPT

## 2019-04-23 PROCEDURE — 86850 RBC ANTIBODY SCREEN: CPT | Performed by: EMERGENCY MEDICINE

## 2019-04-23 PROCEDURE — 81003 URINALYSIS AUTO W/O SCOPE: CPT | Performed by: PHYSICIAN ASSISTANT

## 2019-04-23 PROCEDURE — 83605 ASSAY OF LACTIC ACID: CPT | Performed by: PHYSICIAN ASSISTANT

## 2019-04-23 PROCEDURE — 85610 PROTHROMBIN TIME: CPT | Performed by: PHYSICIAN ASSISTANT

## 2019-04-23 PROCEDURE — 85730 THROMBOPLASTIN TIME PARTIAL: CPT | Performed by: PHYSICIAN ASSISTANT

## 2019-04-23 PROCEDURE — 86901 BLOOD TYPING SEROLOGIC RH(D): CPT | Performed by: EMERGENCY MEDICINE

## 2019-04-23 PROCEDURE — 0 IOPAMIDOL PER 1 ML: Performed by: EMERGENCY MEDICINE

## 2019-04-23 RX ORDER — SODIUM CHLORIDE 0.9 % (FLUSH) 0.9 %
10 SYRINGE (ML) INJECTION AS NEEDED
Status: DISCONTINUED | OUTPATIENT
Start: 2019-04-23 | End: 2019-04-24 | Stop reason: HOSPADM

## 2019-04-23 RX ORDER — DICYCLOMINE HCL 20 MG
20 TABLET ORAL EVERY 6 HOURS PRN
Qty: 20 TABLET | Refills: 0 | Status: SHIPPED | OUTPATIENT
Start: 2019-04-23 | End: 2019-08-08 | Stop reason: ALTCHOICE

## 2019-04-23 RX ORDER — SODIUM CHLORIDE 9 MG/ML
INJECTION, SOLUTION INTRAVENOUS
Status: DISPENSED
Start: 2019-04-23 | End: 2019-04-24

## 2019-04-23 RX ORDER — METRONIDAZOLE 500 MG/1
500 TABLET ORAL EVERY 6 HOURS
Qty: 40 TABLET | Refills: 0 | Status: SHIPPED | OUTPATIENT
Start: 2019-04-23 | End: 2019-05-03

## 2019-04-23 RX ORDER — DICYCLOMINE HYDROCHLORIDE 10 MG/1
20 CAPSULE ORAL ONCE
Status: COMPLETED | OUTPATIENT
Start: 2019-04-23 | End: 2019-04-23

## 2019-04-23 RX ORDER — CIPROFLOXACIN 500 MG/1
500 TABLET, FILM COATED ORAL EVERY 12 HOURS
Qty: 20 TABLET | Refills: 0 | Status: SHIPPED | OUTPATIENT
Start: 2019-04-23 | End: 2019-05-03

## 2019-04-23 RX ORDER — METRONIDAZOLE 500 MG/1
500 TABLET ORAL ONCE
Status: COMPLETED | OUTPATIENT
Start: 2019-04-23 | End: 2019-04-23

## 2019-04-23 RX ORDER — LEVOFLOXACIN 750 MG/1
750 TABLET ORAL ONCE
Status: COMPLETED | OUTPATIENT
Start: 2019-04-23 | End: 2019-04-23

## 2019-04-23 RX ADMIN — SODIUM CHLORIDE 1000 ML: 9 INJECTION, SOLUTION INTRAVENOUS at 20:20

## 2019-04-23 RX ADMIN — LEVOFLOXACIN 750 MG: 750 TABLET, FILM COATED ORAL at 22:14

## 2019-04-23 RX ADMIN — IOPAMIDOL 95 ML: 755 INJECTION, SOLUTION INTRAVENOUS at 20:58

## 2019-04-23 RX ADMIN — METRONIDAZOLE 500 MG: 500 TABLET ORAL at 22:14

## 2019-04-23 RX ADMIN — DICYCLOMINE HYDROCHLORIDE 20 MG: 10 CAPSULE ORAL at 22:15

## 2019-04-24 LAB
ADV 40+41 DNA STL QL NAA+NON-PROBE: NOT DETECTED
ASTRO TYP 1-8 RNA STL QL NAA+NON-PROBE: NOT DETECTED
C CAYETANENSIS DNA STL QL NAA+NON-PROBE: NOT DETECTED
CAMPY SP DNA.DIARRHEA STL QL NAA+PROBE: NOT DETECTED
CRYPTOSP STL CULT: NOT DETECTED
E COLI DNA SPEC QL NAA+PROBE: NOT DETECTED
E HISTOLYT AG STL-ACNC: NOT DETECTED
EAEC PAA PLAS AGGR+AATA ST NAA+NON-PRB: NOT DETECTED
EC STX1 + STX2 GENES STL NAA+PROBE: NOT DETECTED
EPEC EAE GENE STL QL NAA+NON-PROBE: NOT DETECTED
ETEC LTA+ST1A+ST1B TOX ST NAA+NON-PROBE: NOT DETECTED
G LAMBLIA DNA SPEC QL NAA+PROBE: NOT DETECTED
NOROVIRUS GI+II RNA STL QL NAA+NON-PROBE: NOT DETECTED
P SHIGELLOIDES DNA STL QL NAA+NON-PROBE: NOT DETECTED
RV RNA STL NAA+PROBE: NOT DETECTED
SALMONELLA DNA SPEC QL NAA+PROBE: NOT DETECTED
SAPO I+II+IV+V RNA STL QL NAA+NON-PROBE: NOT DETECTED
SHIGELLA SP+EIEC IPAH STL QL NAA+PROBE: NOT DETECTED
V CHOLERAE DNA SPEC QL NAA+PROBE: NOT DETECTED
VIBRIO DNA SPEC NAA+PROBE: NOT DETECTED
YERSINIA STL CULT: NOT DETECTED

## 2019-05-13 ENCOUNTER — OFFICE VISIT (OUTPATIENT)
Dept: INTERNAL MEDICINE | Facility: CLINIC | Age: 59
End: 2019-05-13

## 2019-05-13 VITALS
HEIGHT: 68 IN | BODY MASS INDEX: 34.56 KG/M2 | SYSTOLIC BLOOD PRESSURE: 148 MMHG | RESPIRATION RATE: 18 BRPM | WEIGHT: 228 LBS | OXYGEN SATURATION: 97 % | DIASTOLIC BLOOD PRESSURE: 90 MMHG | HEART RATE: 76 BPM

## 2019-05-13 DIAGNOSIS — G57.91 NEUROPATHY OF RIGHT FOOT: ICD-10-CM

## 2019-05-13 DIAGNOSIS — K52.9 COLITIS: Primary | ICD-10-CM

## 2019-05-13 DIAGNOSIS — E13.9 LATENT AUTOIMMUNE DIABETES IN ADULTS (LADA), MANAGED AS TYPE 1 (HCC): ICD-10-CM

## 2019-05-13 DIAGNOSIS — M79.672 PAIN OF BOTH HEELS: ICD-10-CM

## 2019-05-13 DIAGNOSIS — M77.30 CALCANEAL SPUR, UNSPECIFIED LATERALITY: ICD-10-CM

## 2019-05-13 DIAGNOSIS — G57.90 NEUROPATHY OF FOOT, UNSPECIFIED LATERALITY: ICD-10-CM

## 2019-05-13 DIAGNOSIS — M79.671 PAIN OF BOTH HEELS: ICD-10-CM

## 2019-05-13 PROCEDURE — 99214 OFFICE O/P EST MOD 30 MIN: CPT | Performed by: INTERNAL MEDICINE

## 2019-05-13 RX ORDER — PREGABALIN 75 MG/1
75 CAPSULE ORAL DAILY
Qty: 30 CAPSULE | Refills: 1 | Status: SHIPPED | OUTPATIENT
Start: 2019-05-13 | End: 2019-07-08

## 2019-05-13 NOTE — PROGRESS NOTES
Kristine Rivas is a 59 y.o. female, who presents with a chief complaint of   Chief Complaint   Patient presents with   • Peripheral Neuropathy       HPI   Pt here for follow up.  Pt tried gabapentin.  She thinks the med has helped some.  She does time with the med at night but she can't take it during the day bc it makes her feel funny.  She still has lots of pain during the day.  She has pain on the side of her feet and heel.  standing on her foot makes her pain worse.  Last night she had a strip on the bottom of her left foot that was numb.  It feels better now.  In general she has more pain in the right foot.  Pt has seen dr. Remy and [facility].  She wants to see someone else about the foot pain.  The pain gets worse throughout the day.    DM - last a1c 8.28.  Most am glucoses have been .  She takes novolin r/n bc of cost.  She has been trying to stick to proteins, green veggies, and salad.      She had colitis and was seen in the ER. She now feels much better. She had bright red blood in her stool in the ED but no further issues now.  She finished her abx.  ED records reviewed today.      The following portions of the patient's history were reviewed and updated as appropriate: allergies, current medications, past family history, past medical history, past social history, past surgical history and problem list.    Allergies: Patient has no known allergies.    Review of Systems   Constitutional: Negative.    HENT: Negative.    Eyes: Negative.    Respiratory: Negative.    Cardiovascular: Negative.    Gastrointestinal: Negative.    Endocrine: Negative.    Genitourinary: Negative.    Musculoskeletal: Negative.    Skin: Negative.    Allergic/Immunologic: Negative.    Neurological: Negative.    Hematological: Negative.    Psychiatric/Behavioral: Negative.    All other systems reviewed and are negative.            Wt Readings from Last 3 Encounters:   05/13/19 103 kg (228 lb)   04/23/19 90.7 kg  (200 lb)   04/15/19 105 kg (231 lb)     Temp Readings from Last 3 Encounters:   04/23/19 97.9 °F (36.6 °C) (Oral)   11/17/18 97.7 °F (36.5 °C) (Oral)   08/14/18 98 °F (36.7 °C) (Oral)     BP Readings from Last 3 Encounters:   05/13/19 148/90   04/23/19 129/79   04/15/19 148/88     Pulse Readings from Last 3 Encounters:   05/13/19 76   04/23/19 70   04/15/19 84     Body mass index is 34.67 kg/m².  @LASTSAO2(3)@    Physical Exam   Constitutional: She is oriented to person, place, and time. She appears well-developed and well-nourished. No distress.   HENT:   Head: Normocephalic and atraumatic.   Right Ear: External ear normal.   Left Ear: External ear normal.   Nose: Nose normal.   Mouth/Throat: Oropharynx is clear and moist.   Eyes: Conjunctivae and EOM are normal. Pupils are equal, round, and reactive to light.   Neck: Normal range of motion. Neck supple.   Cardiovascular: Normal rate, regular rhythm, normal heart sounds and intact distal pulses.   Pulmonary/Chest: Effort normal and breath sounds normal. No respiratory distress. She has no wheezes.   Musculoskeletal:   Pt sits and stands frequently trying to get comfortable. There is tenderness ot palpation of posterior and lateral heels.  No ttp over plantar surface bilat.  No ttp over metatarsals, mid-foot, or malleoli.  Pulses intact   Neurological: She is alert and oriented to person, place, and time.   Skin: Skin is warm and dry.   Psychiatric: She has a normal mood and affect. Her behavior is normal. Judgment and thought content normal.   Nursing note and vitals reviewed.      Results for orders placed or performed during the hospital encounter of 04/23/19   Gastrointestinal Panel, PCR - Stool, Per Rectum   Result Value Ref Range    Campylobacter Not Detected Not Detected, Invalid    Plesiomonas shigelloides Not Detected Not Detected    Salmonella Not Detected Not Detected    Vibrio Not Detected Not Detected    Vibrio cholerae Not Detected Not Detected     Yersinia enterocolitica Not Detected Not Detected    Enteroaggregative E. coli (EAEC) Not Detected Not Detected    Enteropathogenic E. coli (EPEC) Not Detected Not Detected    Enterotoxigenic E. coli (ETEC) lt/st Not Detected Not Detected    Shiga-like toxin-producing E. coli (STEC) stx1/stx2 Not Detected Not Detected    E. coli O157 Not Detected Not Detected    Shigella/Enteroinvasive E. coli (EIEC) Not Detected Not Detected    Cryptosporidium Not Detected Not Detected, Invalid    Cyclospora cayetanensis Not Detected Not Detected    Entamoeba histolytica Not Detected Not Detected    Giardia lamblia Not Detected Not Detected    Adenovirus F40/41 Not Detected Not Detected    Astrovirus Not Detected Not Detected    Norovirus GI/GII Not Detected Not Detected    Rotavirus A Not Detected Not Detected    Sapovirus (I, II, IV or V) Not Detected Not Detected   Protime-INR   Result Value Ref Range    Protime 12.6 12.1 - 15.0 Seconds    INR 0.97 0.90 - 1.10   aPTT   Result Value Ref Range    PTT 29.8 24.3 - 38.1 seconds   Comprehensive Metabolic Panel   Result Value Ref Range    Glucose 173 (H) 65 - 99 mg/dL    BUN 11 6 - 20 mg/dL    Creatinine 0.72 0.57 - 1.00 mg/dL    Sodium 137 136 - 145 mmol/L    Potassium 3.9 3.5 - 5.2 mmol/L    Chloride 102 98 - 107 mmol/L    CO2 24.5 22.0 - 29.0 mmol/L    Calcium 8.9 8.6 - 10.5 mg/dL    Total Protein 7.2 6.0 - 8.5 g/dL    Albumin 4.40 3.50 - 5.20 g/dL    ALT (SGPT) 17 1 - 33 U/L    AST (SGOT) 14 1 - 32 U/L    Alkaline Phosphatase 96 39 - 117 U/L    Total Bilirubin 0.3 0.2 - 1.2 mg/dL    eGFR Non African Amer 83 >60 mL/min/1.73    Globulin 2.8 gm/dL    A/G Ratio 1.6 g/dL    BUN/Creatinine Ratio 15.3 7.0 - 25.0    Anion Gap 10.5 mmol/L   Urinalysis With Culture If Indicated - Urine, Clean Catch   Result Value Ref Range    Color, UA Yellow Yellow, Straw    Appearance, UA Clear Clear    pH, UA 5.5 4.5 - 8.0    Specific Gravity, UA <=1.005 1.003 - 1.030    Glucose, UA Negative Negative     Ketones, UA Negative Negative    Bilirubin, UA Negative Negative    Blood, UA Negative Negative    Protein, UA Negative Negative    Leuk Esterase, UA Negative Negative    Nitrite, UA Negative Negative    Urobilinogen, UA 0.2 E.U./dL 0.2 - 1.0 E.U./dL   CBC Auto Differential   Result Value Ref Range    WBC 10.90 (H) 3.40 - 10.80 10*3/mm3    RBC 4.75 3.77 - 5.28 10*6/mm3    Hemoglobin 12.7 12.0 - 15.9 g/dL    Hematocrit 40.5 34.0 - 46.6 %    MCV 85.3 79.0 - 97.0 fL    MCH 26.7 26.6 - 33.0 pg    MCHC 31.4 (L) 31.5 - 35.7 g/dL    RDW 14.5 12.3 - 15.4 %    RDW-SD 44.4 37.0 - 54.0 fl    MPV 11.3 6.0 - 12.0 fL    Platelets 254 140 - 450 10*3/mm3    Neutrophil % 63.4 42.7 - 76.0 %    Lymphocyte % 27.7 19.6 - 45.3 %    Monocyte % 5.3 5.0 - 12.0 %    Eosinophil % 3.1 0.3 - 6.2 %    Basophil % 0.3 0.0 - 1.5 %    Immature Grans % 0.2 0.0 - 0.5 %    Neutrophils, Absolute 6.91 1.70 - 7.00 10*3/mm3    Lymphocytes, Absolute 3.02 0.70 - 3.10 10*3/mm3    Monocytes, Absolute 0.58 0.10 - 0.90 10*3/mm3    Eosinophils, Absolute 0.34 0.00 - 0.40 10*3/mm3    Basophils, Absolute 0.03 0.00 - 0.20 10*3/mm3    Immature Grans, Absolute 0.02 0.00 - 0.05 10*3/mm3    nRBC 0.0 0.0 - 0.2 /100 WBC   Lactic Acid, Plasma   Result Value Ref Range    Lactate 1.0 0.5 - 2.0 mmol/L   POC Glucose Once   Result Value Ref Range    Glucose 176 (H) 70 - 130 mg/dL   Type & Screen   Result Value Ref Range    ABO Type O     RH type Positive     Antibody Screen Negative     T&S Expiration Date 4/26/2019 11:59:59 PM    ABO RH Specimen Verification   Result Value Ref Range    ABO Type O     RH type Positive            Kristine was seen today for peripheral neuropathy.    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      Outpatient Medications Prior to Visit   Medication Sig Dispense Refill   • acetaminophen (TYLENOL) 500 MG tablet Take 1,000 mg by mouth 2 (Two) Times a Day.     • aspirin 81 MG tablet Take 1 tablet by mouth daily.     • atorvastatin (LIPITOR) 40 MG tablet Take 1 tablet by mouth Every Night. 90 tablet 1   • carvedilol (COREG) 3.125 MG tablet TAKE ONE TABLET BY MOUTH TWICE A  tablet 1   • dicyclomine (BENTYL) 20 MG tablet Take 1 tablet by mouth Every 6 (Six) Hours As Needed (cramping). 20 tablet 0   • gabapentin (NEURONTIN) 300 MG capsule Take 1 capsule by mouth 3 (Three) Times a Day. 90 capsule 0   • insulin NPH (humuLIN N,novoLIN N) 100 UNIT/ML injection Inject 32 Units under the skin Every Night.     • insulin regular (humuLIN R,novoLIN R) 100 UNIT/ML injection Inject 15 Units under the skin 3 (Three) Times a Day Before Meals. 15-20 units at breakfast   15-20 units at lunch   25-45 units at dinner     • lisinopril (PRINIVIL,ZESTRIL) 40 MG tablet TAKE ONE TABLET BY MOUTH DAILY 90 tablet 1   • meclizine (ANTIVERT) 25 MG tablet Take 1 tablet by mouth 3 (Three) Times a Day As Needed for dizziness or Nausea for up to 30 doses. 30 tablet 0   • melatonin 5 MG tablet tablet Take 5 mg by mouth.     • pantoprazole (PROTONIX) 40 MG EC tablet Take 1 tablet by mouth Daily. 90 tablet 1   • WAVESENSE PRESTO test strip USE ONE STRIP TO TEST FOUR TIMES A  each 4     No facility-administered medications prior to visit.      No orders of the defined types were placed in this encounter.    [unfilled]  There are no discontinued medications.      Return in about 3 months (around 8/13/2019) for Recheck, labs.

## 2019-05-14 ENCOUNTER — TELEPHONE (OUTPATIENT)
Dept: CARDIOLOGY | Facility: CLINIC | Age: 59
End: 2019-05-14

## 2019-05-14 NOTE — TELEPHONE ENCOUNTER
Pt called and states that she saw her new PCP and she wants her to start taking Lyrica 75 mg QD. Pt wants to know if its ok to take it?.......Please advise        Thanks  Anushka GUTIERREZ

## 2019-05-29 ENCOUNTER — TRANSCRIBE ORDERS (OUTPATIENT)
Dept: ADMINISTRATIVE | Facility: HOSPITAL | Age: 59
End: 2019-05-29

## 2019-05-29 DIAGNOSIS — G62.9 NEUROPATHY: Primary | ICD-10-CM

## 2019-06-06 ENCOUNTER — OFFICE VISIT (OUTPATIENT)
Dept: GASTROENTEROLOGY | Facility: CLINIC | Age: 59
End: 2019-06-06

## 2019-06-06 VITALS
DIASTOLIC BLOOD PRESSURE: 78 MMHG | BODY MASS INDEX: 34.43 KG/M2 | HEIGHT: 68 IN | WEIGHT: 227.2 LBS | SYSTOLIC BLOOD PRESSURE: 138 MMHG

## 2019-06-06 DIAGNOSIS — Z12.11 SCREEN FOR COLON CANCER: Primary | ICD-10-CM

## 2019-06-06 DIAGNOSIS — K21.9 GASTROESOPHAGEAL REFLUX DISEASE, ESOPHAGITIS PRESENCE NOT SPECIFIED: ICD-10-CM

## 2019-06-06 DIAGNOSIS — Z83.71 FAMILY HISTORY OF POLYPS IN THE COLON: ICD-10-CM

## 2019-06-06 PROCEDURE — 99204 OFFICE O/P NEW MOD 45 MIN: CPT | Performed by: INTERNAL MEDICINE

## 2019-06-06 NOTE — PROGRESS NOTES
PATIENT INFORMATION  Kristine Rivas       - 1960    CHIEF COMPLAINT  Chief Complaint   Patient presents with   • Rectal Bleeding     colitis       HISTORY OF PRESENT ILLNESS  Recent Colitis was in ER and CT showed long segment left sided colitis and no previous exam and has 2 sistes with colon polyps and and Unle with possibly CRC    Life long Heartburn and her Cardioloist is who put her on the Protonix            REVIEW OF SYSTEMS  Review of Systems   All other systems reviewed and are negative.        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]   • Hyperlipidemia [E78.5]   • Essential hypertension [I10]         PAST MEDICAL HISTORY  Past Medical History:   Diagnosis Date   • AC (acromioclavicular) joint bone spurs    • Diabetes mellitus (CMS/HCC)    • GERD (gastroesophageal reflux disease)    • Herpes zoster    • Hyperlipidemia    • Hypertension    • Myocardial infarction (CMS/HCC)          SURGICAL HISTORY  Past Surgical History:   Procedure Laterality Date   • BREAST LUMPECTOMY     • BREAST LUMPECTOMY      for benign lump   • CARDIAC SURGERY  2014   •  SECTION     • CORONARY ARTERY BYPASS GRAFT     • KNEE SURGERY           FAMILY HISTORY  Family History   Problem Relation Age of Onset   • Hypertension Father    • Diabetes Father    • Glaucoma Father    • Hypotension Father    • Kidney failure Father    • Anxiety disorder Father    • Hypertension Sister    • Diabetes Sister    • Anxiety disorder Sister    • Colon polyps Sister    • Osteoporosis Mother    • Cataracts Mother    • Heart failure Mother    •  Diverticulitis Mother    • Colon polyps Brother    • Colon polyps Maternal Uncle    • Colon cancer Maternal Uncle          SOCIAL HISTORY  Social History     Occupational History   • Not on file   Tobacco Use   • Smoking status: Former Smoker     Packs/day: 1.00     Years: 15.00     Pack years: 15.00     Last attempt to quit: 2014     Years since quittin.9   • Smokeless tobacco: Never Used   Substance and Sexual Activity   • Alcohol use: Yes   • Drug use: No   • Sexual activity: Not on file       Debilities/Disabilities Identified: None    Emotional Behavior: Appropriate    CURRENT MEDICATIONS    Current Outpatient Medications:   •  acetaminophen (TYLENOL) 500 MG tablet, Take 1,000 mg by mouth 2 (Two) Times a Day., Disp: , Rfl:   •  aspirin 81 MG tablet, Take 1 tablet by mouth daily., Disp: , Rfl:   •  atorvastatin (LIPITOR) 40 MG tablet, Take 1 tablet by mouth Every Night., Disp: 90 tablet, Rfl: 1  •  carvedilol (COREG) 3.125 MG tablet, TAKE ONE TABLET BY MOUTH TWICE A DAY, Disp: 180 tablet, Rfl: 1  •  dicyclomine (BENTYL) 20 MG tablet, Take 1 tablet by mouth Every 6 (Six) Hours As Needed (cramping)., Disp: 20 tablet, Rfl: 0  •  gabapentin (NEURONTIN) 300 MG capsule, Take 1 capsule by mouth 3 (Three) Times a Day., Disp: 90 capsule, Rfl: 0  •  insulin NPH (humuLIN N,novoLIN N) 100 UNIT/ML injection, Inject 32 Units under the skin Every Night., Disp: , Rfl:   •  insulin regular (humuLIN R,novoLIN R) 100 UNIT/ML injection, Inject 15 Units under the skin 3 (Three) Times a Day Before Meals. 15-20 units at breakfast  15-20 units at lunch  25-45 units at dinner, Disp: , Rfl:   •  lisinopril (PRINIVIL,ZESTRIL) 40 MG tablet, TAKE ONE TABLET BY MOUTH DAILY, Disp: 90 tablet, Rfl: 1  •  meclizine (ANTIVERT) 25 MG tablet, Take 1 tablet by mouth 3 (Three) Times a Day As Needed for dizziness or Nausea for up to 30 doses., Disp: 30 tablet, Rfl: 0  •  melatonin 5 MG tablet tablet, Take 5 mg by mouth., Disp: , Rfl:   •   "pantoprazole (PROTONIX) 40 MG EC tablet, Take 1 tablet by mouth Daily., Disp: 90 tablet, Rfl: 1  •  pregabalin (LYRICA) 75 MG capsule, Take 1 capsule by mouth Daily., Disp: 30 capsule, Rfl: 1  •  WAVESENSE PRESTO test strip, USE ONE STRIP TO TEST FOUR TIMES A DAY, Disp: 360 each, Rfl: 4    ALLERGIES  Patient has no known allergies.    VITALS  Vitals:    06/06/19 1121   BP: 138/78   Weight: 103 kg (227 lb 3.2 oz)   Height: 172.7 cm (67.99\")       LAST RESULTS   Admission on 04/23/2019, Discharged on 04/23/2019   Component Date Value Ref Range Status   • Protime 04/23/2019 12.6  12.1 - 15.0 Seconds Final   • INR 04/23/2019 0.97  0.90 - 1.10 Final   • PTT 04/23/2019 29.8  24.3 - 38.1 seconds Final   • Glucose 04/23/2019 173* 65 - 99 mg/dL Final   • BUN 04/23/2019 11  6 - 20 mg/dL Final   • Creatinine 04/23/2019 0.72  0.57 - 1.00 mg/dL Final   • Sodium 04/23/2019 137  136 - 145 mmol/L Final   • Potassium 04/23/2019 3.9  3.5 - 5.2 mmol/L Final   • Chloride 04/23/2019 102  98 - 107 mmol/L Final   • CO2 04/23/2019 24.5  22.0 - 29.0 mmol/L Final   • Calcium 04/23/2019 8.9  8.6 - 10.5 mg/dL Final   • Total Protein 04/23/2019 7.2  6.0 - 8.5 g/dL Final   • Albumin 04/23/2019 4.40  3.50 - 5.20 g/dL Final   • ALT (SGPT) 04/23/2019 17  1 - 33 U/L Final   • AST (SGOT) 04/23/2019 14  1 - 32 U/L Final   • Alkaline Phosphatase 04/23/2019 96  39 - 117 U/L Final   • Total Bilirubin 04/23/2019 0.3  0.2 - 1.2 mg/dL Final   • eGFR Non  Amer 04/23/2019 83  >60 mL/min/1.73 Final   • Globulin 04/23/2019 2.8  gm/dL Final   • A/G Ratio 04/23/2019 1.6  g/dL Final   • BUN/Creatinine Ratio 04/23/2019 15.3  7.0 - 25.0 Final   • Anion Gap 04/23/2019 10.5  mmol/L Final   • Color, UA 04/23/2019 Yellow  Yellow, Straw Final   • Appearance, UA 04/23/2019 Clear  Clear Final   • pH, UA 04/23/2019 5.5  4.5 - 8.0 Final   • Specific Gravity, UA 04/23/2019 <=1.005  1.003 - 1.030 Final   • Glucose, UA 04/23/2019 Negative  Negative Final   • Ketones, UA " 04/23/2019 Negative  Negative Final   • Bilirubin, UA 04/23/2019 Negative  Negative Final   • Blood, UA 04/23/2019 Negative  Negative Final   • Protein, UA 04/23/2019 Negative  Negative Final   • Leuk Esterase, UA 04/23/2019 Negative  Negative Final   • Nitrite, UA 04/23/2019 Negative  Negative Final   • Urobilinogen, UA 04/23/2019 0.2 E.U./dL  0.2 - 1.0 E.U./dL Final   • WBC 04/23/2019 10.90* 3.40 - 10.80 10*3/mm3 Final   • RBC 04/23/2019 4.75  3.77 - 5.28 10*6/mm3 Final   • Hemoglobin 04/23/2019 12.7  12.0 - 15.9 g/dL Final   • Hematocrit 04/23/2019 40.5  34.0 - 46.6 % Final   • MCV 04/23/2019 85.3  79.0 - 97.0 fL Final   • MCH 04/23/2019 26.7  26.6 - 33.0 pg Final   • MCHC 04/23/2019 31.4* 31.5 - 35.7 g/dL Final   • RDW 04/23/2019 14.5  12.3 - 15.4 % Final   • RDW-SD 04/23/2019 44.4  37.0 - 54.0 fl Final   • MPV 04/23/2019 11.3  6.0 - 12.0 fL Final   • Platelets 04/23/2019 254  140 - 450 10*3/mm3 Final   • Neutrophil % 04/23/2019 63.4  42.7 - 76.0 % Final   • Lymphocyte % 04/23/2019 27.7  19.6 - 45.3 % Final   • Monocyte % 04/23/2019 5.3  5.0 - 12.0 % Final   • Eosinophil % 04/23/2019 3.1  0.3 - 6.2 % Final   • Basophil % 04/23/2019 0.3  0.0 - 1.5 % Final   • Immature Grans % 04/23/2019 0.2  0.0 - 0.5 % Final   • Neutrophils, Absolute 04/23/2019 6.91  1.70 - 7.00 10*3/mm3 Final   • Lymphocytes, Absolute 04/23/2019 3.02  0.70 - 3.10 10*3/mm3 Final   • Monocytes, Absolute 04/23/2019 0.58  0.10 - 0.90 10*3/mm3 Final   • Eosinophils, Absolute 04/23/2019 0.34  0.00 - 0.40 10*3/mm3 Final   • Basophils, Absolute 04/23/2019 0.03  0.00 - 0.20 10*3/mm3 Final   • Immature Grans, Absolute 04/23/2019 0.02  0.00 - 0.05 10*3/mm3 Final   • nRBC 04/23/2019 0.0  0.0 - 0.2 /100 WBC Final   • Campylobacter 04/23/2019 Not Detected  Not Detected, Invalid Final   • Plesiomonas shigelloides 04/23/2019 Not Detected  Not Detected Final   • Salmonella 04/23/2019 Not Detected  Not Detected Final   • Vibrio 04/23/2019 Not Detected  Not  Detected Final   • Vibrio cholerae 04/23/2019 Not Detected  Not Detected Final   • Yersinia enterocolitica 04/23/2019 Not Detected  Not Detected Final   • Enteroaggregative E. coli (EAEC) 04/23/2019 Not Detected  Not Detected Final   • Enteropathogenic E. coli (EPEC) 04/23/2019 Not Detected  Not Detected Final   • Enterotoxigenic E. coli (ETEC) lt/* 04/23/2019 Not Detected  Not Detected Final   • Shiga-like toxin-producing E. coli* 04/23/2019 Not Detected  Not Detected Final   • E. coli O157 04/23/2019 Not Detected  Not Detected Final   • Shigella/Enteroinvasive E. coli (E* 04/23/2019 Not Detected  Not Detected Final   • Cryptosporidium 04/23/2019 Not Detected  Not Detected, Invalid Final   • Cyclospora cayetanensis 04/23/2019 Not Detected  Not Detected Final   • Entamoeba histolytica 04/23/2019 Not Detected  Not Detected Final   • Giardia lamblia 04/23/2019 Not Detected  Not Detected Final   • Adenovirus F40/41 04/23/2019 Not Detected  Not Detected Final   • Astrovirus 04/23/2019 Not Detected  Not Detected Final   • Norovirus GI/GII 04/23/2019 Not Detected  Not Detected Final   • Rotavirus A 04/23/2019 Not Detected  Not Detected Final   • Sapovirus (I, II, IV or V) 04/23/2019 Not Detected  Not Detected Final   • Glucose 04/23/2019 176* 70 - 130 mg/dL Final   • Lactate 04/23/2019 1.0  0.5 - 2.0 mmol/L Final   • ABO Type 04/23/2019 O   Final   • RH type 04/23/2019 Positive   Final   • Antibody Screen 04/23/2019 Negative   Final   • T&S Expiration Date 04/23/2019 4/26/2019 11:59:59 PM   Final   • ABO Type 04/23/2019 O   Final   • RH type 04/23/2019 Positive   Final     No results found.    PHYSICAL EXAM  Physical Exam   Constitutional: She is oriented to person, place, and time. She appears well-developed and well-nourished.   Eyes: Conjunctivae and EOM are normal. Pupils are equal, round, and reactive to light. No scleral icterus.   Neck: Normal range of motion. Neck supple. No thyromegaly present.   Cardiovascular:  Normal rate, regular rhythm, normal heart sounds and intact distal pulses. Exam reveals no gallop.   No murmur heard.  Pulmonary/Chest: Effort normal and breath sounds normal. She has no wheezes. She has no rales.   Abdominal: Soft. Bowel sounds are normal. She exhibits no shifting dullness, no distension, no fluid wave, no abdominal bruit, no ascites and no mass. There is no hepatosplenomegaly. There is tenderness in the epigastric area. There is no guarding and negative Julian's sign. Hernia confirmed negative in the ventral area.   Musculoskeletal: Normal range of motion. She exhibits no edema.   Lymphadenopathy:     She has no cervical adenopathy.   Neurological: She is alert and oriented to person, place, and time.   Skin: Skin is warm and dry. No rash noted. She is not diaphoretic. No erythema.       ASSESSMENT  Diagnoses and all orders for this visit:    Screen for colon cancer  -     Case Request; Standing  -     Case Request    Family history of polyps in the colon  -     Case Request; Standing  -     Case Request    Gastroesophageal reflux disease, esophagitis presence not specified  -     Case Request; Standing  -     Case Request    Other orders  -     Follow Anesthesia Guidelines / Standing Orders; Future  -     Obtain Informed Consent; Future  -     Obtain Informed Consent; Standing          PLAN  Return in about 2 months (around 8/6/2019).

## 2019-06-11 PROBLEM — Z12.11 SCREEN FOR COLON CANCER: Status: ACTIVE | Noted: 2019-06-11

## 2019-06-11 PROBLEM — K21.9 GASTROESOPHAGEAL REFLUX DISEASE: Status: ACTIVE | Noted: 2019-06-11

## 2019-06-11 PROBLEM — Z83.719 FAMILY HISTORY OF POLYPS IN THE COLON: Status: ACTIVE | Noted: 2019-06-11

## 2019-06-11 PROBLEM — Z83.71 FAMILY HISTORY OF POLYPS IN THE COLON: Status: ACTIVE | Noted: 2019-06-11

## 2019-06-13 ENCOUNTER — HOSPITAL ENCOUNTER (OUTPATIENT)
Dept: INFUSION THERAPY | Facility: HOSPITAL | Age: 59
Discharge: HOME | End: 2019-06-13
Admitting: PSYCHIATRY & NEUROLOGY

## 2019-06-13 DIAGNOSIS — G62.9 NEUROPATHY: ICD-10-CM

## 2019-06-13 PROCEDURE — 95886 MUSC TEST DONE W/N TEST COMP: CPT

## 2019-06-13 PROCEDURE — 95909 NRV CNDJ TST 5-6 STUDIES: CPT

## 2019-06-13 PROCEDURE — 95909 NRV CNDJ TST 5-6 STUDIES: CPT | Performed by: PSYCHIATRY & NEUROLOGY

## 2019-06-13 PROCEDURE — 95886 MUSC TEST DONE W/N TEST COMP: CPT | Performed by: PSYCHIATRY & NEUROLOGY

## 2019-06-13 NOTE — PROCEDURES
EMG and Nerve Conduction Studies    Please see data sheets for details on methods, temperatures and lab standards. EMG muscles tested for upper extremity studies include the deltoid, biceps, triceps, pronator teres, extensor digitorum communis, first dorsal interosseous and abductor pollicis brevis.  EMG muscles tested for lower extremity studies include the vastus lateralis, tibialis anterior, peroneus longus, medial gastrocnemius and extensor digitorum brevis.  Additional muscles tested as needed.  Paraspinal muscles tested as needed.  The complete report includes the data sheets.    Indication: Bilateral lower extremity pain left greater than right.  Low back pain.  24-year history of diabetes  History: 59-year-old woman with long-standing diabetes who has bilateral lower extremity pain worse on the left.  She has chronic low back pain.      Ht: 172.7 cm  Wt: 103 kg; BMI 34.55  HbA1C:   Lab Results   Component Value Date    HGBA1C 8.28 (H) 04/11/2019     TSH:   Lab Results   Component Value Date    TSH 1.070 09/13/2018       Technical summary:  Nerve conduction studies were obtained in the left leg with 1 comparison on the right.  The feet were cold and despite attempts at warming I was not able to keep them at greater than 32 °C.  Temperature correction was used where indicated.  Needle examination was obtained on selected muscles of both legs.    Results:  1.  Normal left sural sensory study with temperature correction  2.  Normal left superficial peroneal sensory study.  3.  Normal left peroneal motor study.  4.  Normal tibial motor studies bilaterally.  5.  Needle examination of selected muscles in both legs showed increased insertional activities with 1+ fibrillations and positive sharp waves in the right extensor digitorum brevis.  There was an increased number of large motor units increased firing rate and reduced interference pattern in this muscle.  The remaining muscles tested showed normal insertional  activities.  There was a mild increased number of large motor units in the right peroneus longus and medial gastrocnemius with some increase in firing rate and reduced interference pattern.  Remaining muscles tested showed normal motor units and recruitment.  Lumbar paraspinals at L5 showed no abnormality on either side.    Impression:  Abnormal study most consistent with a right S1 radiculopathy with some acute and chronic changes.  The nerve conduction studies were normal therefore no nerve conduction findings to suggest peripheral neuropathy, a left peroneal neuropathy at the knee or tibial neuropathy at either ankle.  Study results were discussed with the patient.    Darrius Martinez M.D.              Dictated utilizing Dragon dictation.

## 2019-06-26 ENCOUNTER — TELEPHONE (OUTPATIENT)
Dept: INTERNAL MEDICINE | Facility: CLINIC | Age: 59
End: 2019-06-26

## 2019-06-26 NOTE — TELEPHONE ENCOUNTER
[facility] with details per HIPAA form - per Dr. Moctezuma, patient must make appt with her in order to document need for pain man referral and possible further imagining.     ----- Message from Leisa Pond sent at 6/26/2019  1:09 PM EDT -----  Regarding: REFERRAL FOR PAIN MANAGEMENT  Contact: [phone]  [facility] PT    Patient called to say that she went to have the tests done for her pain, and the neurologist told her that she should go to a pain management for her back and spine pain. They gave her the name of someone, but she wants to go her to [place].  Can dr moctezuma refer her to someone here in [place]?    Please call the patient    Thanks!  leisa

## 2019-07-08 ENCOUNTER — OFFICE VISIT (OUTPATIENT)
Dept: INTERNAL MEDICINE | Facility: CLINIC | Age: 59
End: 2019-07-08

## 2019-07-08 VITALS
TEMPERATURE: 97.8 F | DIASTOLIC BLOOD PRESSURE: 82 MMHG | OXYGEN SATURATION: 97 % | BODY MASS INDEX: 33.8 KG/M2 | SYSTOLIC BLOOD PRESSURE: 144 MMHG | HEIGHT: 68 IN | WEIGHT: 223 LBS | HEART RATE: 76 BPM | RESPIRATION RATE: 16 BRPM

## 2019-07-08 DIAGNOSIS — M54.42 CHRONIC LEFT-SIDED LOW BACK PAIN WITH LEFT-SIDED SCIATICA: ICD-10-CM

## 2019-07-08 DIAGNOSIS — M54.17 LUMBOSACRAL RADICULOPATHY AT S1: Primary | ICD-10-CM

## 2019-07-08 DIAGNOSIS — G89.29 CHRONIC LEFT-SIDED LOW BACK PAIN WITH LEFT-SIDED SCIATICA: ICD-10-CM

## 2019-07-08 PROCEDURE — 99213 OFFICE O/P EST LOW 20 MIN: CPT | Performed by: INTERNAL MEDICINE

## 2019-07-08 RX ORDER — CYCLOBENZAPRINE HCL 10 MG
10 TABLET ORAL 3 TIMES DAILY PRN
Qty: 30 TABLET | Refills: 0 | Status: SHIPPED | OUTPATIENT
Start: 2019-07-08 | End: 2019-08-08 | Stop reason: ALTCHOICE

## 2019-07-08 NOTE — PROGRESS NOTES
Kristine Rivas is a 59 y.o. female, who presents with a chief complaint of   Chief Complaint   Patient presents with   • Follow-up     recent test results    • Pain     Neuropathy x concerns       HPI   Pt here for follow up.  She recently had EMG per ortho to see if she had neuropathy.  emg results c/w S1 radiculopathy.    She cont to have pain in her left leg. She says both legs hurt by the end of the day but the left the worst.  No weakness in leg.  She says sometimes stepping on her foot makes her feel like her left ankle is broken and hurts.  She says it feels like the pain starts in her calf and goes up her leg. No bowel/bladder sx.  She is using a topical cream that doesn't help much.  She says gabapentin made her feel groggy and lyrica made her glucoses go to the 300's      The following portions of the patient's history were reviewed and updated as appropriate: allergies, current medications, past family history, past medical history, past social history, past surgical history and problem list.    Allergies: Patient has no known allergies.    Review of Systems   Constitutional: Negative.    HENT: Negative.    Eyes: Negative.    Respiratory: Negative.    Cardiovascular: Negative.    Gastrointestinal: Negative.    Endocrine: Negative.    Genitourinary: Negative.    Musculoskeletal: Positive for back pain and gait problem.   Skin: Negative.    Allergic/Immunologic: Negative.    Hematological: Negative.    Psychiatric/Behavioral: Negative.    All other systems reviewed and are negative.            Wt Readings from Last 3 Encounters:   07/08/19 101 kg (223 lb)   06/06/19 103 kg (227 lb 3.2 oz)   05/13/19 103 kg (228 lb)     Temp Readings from Last 3 Encounters:   07/08/19 97.8 °F (36.6 °C) (Oral)   04/23/19 97.9 °F (36.6 °C) (Oral)   11/17/18 97.7 °F (36.5 °C) (Oral)     BP Readings from Last 3 Encounters:   07/08/19 144/82   06/06/19 138/78   05/13/19 148/90     Pulse Readings from Last 3 Encounters:    07/08/19 76   05/13/19 76   04/23/19 70     Body mass index is 33.92 kg/m².  @LASTSAO2(3)@    Physical Exam   Constitutional: She is oriented to person, place, and time. She appears well-developed and well-nourished. No distress.   HENT:   Head: Normocephalic and atraumatic.   Right Ear: External ear normal.   Left Ear: External ear normal.   Nose: Nose normal.   Mouth/Throat: Oropharynx is clear and moist.   Eyes: Conjunctivae and EOM are normal. Pupils are equal, round, and reactive to light.   Neck: Normal range of motion. Neck supple.   Cardiovascular: Normal rate, regular rhythm, normal heart sounds and intact distal pulses.   Pulmonary/Chest: Effort normal and breath sounds normal. No respiratory distress. She has no wheezes.   Musculoskeletal:   Pt walking slowly and appears to be uncomfortable  SLR positive bilat with more pain on left compared to right.     Neurological: She is alert and oriented to person, place, and time.   Skin: Skin is warm and dry.   Psychiatric: She has a normal mood and affect. Her behavior is normal. Judgment and thought content normal.   Nursing note and vitals reviewed.      Results for orders placed or performed during the hospital encounter of 04/23/19   Gastrointestinal Panel, PCR - Stool, Per Rectum   Result Value Ref Range    Campylobacter Not Detected Not Detected, Invalid    Plesiomonas shigelloides Not Detected Not Detected    Salmonella Not Detected Not Detected    Vibrio Not Detected Not Detected    Vibrio cholerae Not Detected Not Detected    Yersinia enterocolitica Not Detected Not Detected    Enteroaggregative E. coli (EAEC) Not Detected Not Detected    Enteropathogenic E. coli (EPEC) Not Detected Not Detected    Enterotoxigenic E. coli (ETEC) lt/st Not Detected Not Detected    Shiga-like toxin-producing E. coli (STEC) stx1/stx2 Not Detected Not Detected    E. coli O157 Not Detected Not Detected    Shigella/Enteroinvasive E. coli (EIEC) Not Detected Not Detected     Cryptosporidium Not Detected Not Detected, Invalid    Cyclospora cayetanensis Not Detected Not Detected    Entamoeba histolytica Not Detected Not Detected    Giardia lamblia Not Detected Not Detected    Adenovirus F40/41 Not Detected Not Detected    Astrovirus Not Detected Not Detected    Norovirus GI/GII Not Detected Not Detected    Rotavirus A Not Detected Not Detected    Sapovirus (I, II, IV or V) Not Detected Not Detected   Protime-INR   Result Value Ref Range    Protime 12.6 12.1 - 15.0 Seconds    INR 0.97 0.90 - 1.10   aPTT   Result Value Ref Range    PTT 29.8 24.3 - 38.1 seconds   Comprehensive Metabolic Panel   Result Value Ref Range    Glucose 173 (H) 65 - 99 mg/dL    BUN 11 6 - 20 mg/dL    Creatinine 0.72 0.57 - 1.00 mg/dL    Sodium 137 136 - 145 mmol/L    Potassium 3.9 3.5 - 5.2 mmol/L    Chloride 102 98 - 107 mmol/L    CO2 24.5 22.0 - 29.0 mmol/L    Calcium 8.9 8.6 - 10.5 mg/dL    Total Protein 7.2 6.0 - 8.5 g/dL    Albumin 4.40 3.50 - 5.20 g/dL    ALT (SGPT) 17 1 - 33 U/L    AST (SGOT) 14 1 - 32 U/L    Alkaline Phosphatase 96 39 - 117 U/L    Total Bilirubin 0.3 0.2 - 1.2 mg/dL    eGFR Non African Amer 83 >60 mL/min/1.73    Globulin 2.8 gm/dL    A/G Ratio 1.6 g/dL    BUN/Creatinine Ratio 15.3 7.0 - 25.0    Anion Gap 10.5 mmol/L   Urinalysis With Culture If Indicated - Urine, Clean Catch   Result Value Ref Range    Color, UA Yellow Yellow, Straw    Appearance, UA Clear Clear    pH, UA 5.5 4.5 - 8.0    Specific Gravity, UA <=1.005 1.003 - 1.030    Glucose, UA Negative Negative    Ketones, UA Negative Negative    Bilirubin, UA Negative Negative    Blood, UA Negative Negative    Protein, UA Negative Negative    Leuk Esterase, UA Negative Negative    Nitrite, UA Negative Negative    Urobilinogen, UA 0.2 E.U./dL 0.2 - 1.0 E.U./dL   CBC Auto Differential   Result Value Ref Range    WBC 10.90 (H) 3.40 - 10.80 10*3/mm3    RBC 4.75 3.77 - 5.28 10*6/mm3    Hemoglobin 12.7 12.0 - 15.9 g/dL    Hematocrit 40.5 34.0 -  46.6 %    MCV 85.3 79.0 - 97.0 fL    MCH 26.7 26.6 - 33.0 pg    MCHC 31.4 (L) 31.5 - 35.7 g/dL    RDW 14.5 12.3 - 15.4 %    RDW-SD 44.4 37.0 - 54.0 fl    MPV 11.3 6.0 - 12.0 fL    Platelets 254 140 - 450 10*3/mm3    Neutrophil % 63.4 42.7 - 76.0 %    Lymphocyte % 27.7 19.6 - 45.3 %    Monocyte % 5.3 5.0 - 12.0 %    Eosinophil % 3.1 0.3 - 6.2 %    Basophil % 0.3 0.0 - 1.5 %    Immature Grans % 0.2 0.0 - 0.5 %    Neutrophils, Absolute 6.91 1.70 - 7.00 10*3/mm3    Lymphocytes, Absolute 3.02 0.70 - 3.10 10*3/mm3    Monocytes, Absolute 0.58 0.10 - 0.90 10*3/mm3    Eosinophils, Absolute 0.34 0.00 - 0.40 10*3/mm3    Basophils, Absolute 0.03 0.00 - 0.20 10*3/mm3    Immature Grans, Absolute 0.02 0.00 - 0.05 10*3/mm3    nRBC 0.0 0.0 - 0.2 /100 WBC   Lactic Acid, Plasma   Result Value Ref Range    Lactate 1.0 0.5 - 2.0 mmol/L   POC Glucose Once   Result Value Ref Range    Glucose 176 (H) 70 - 130 mg/dL   Type & Screen   Result Value Ref Range    ABO Type O     RH type Positive     Antibody Screen Negative     T&S Expiration Date 4/26/2019 11:59:59 PM    ABO RH Specimen Verification   Result Value Ref Range    ABO Type O     RH type Positive            Kristine was seen today for follow-up and pain.    Diagnoses and all orders for this visit:    Lumbosacral radiculopathy at S1  -     MRI Lumbar Spine Without Contrast; Future  -     Ambulatory Referral to Physical Therapy Evaluate and treat    Chronic left-sided low back pain with left-sided sciatica  -     MRI Lumbar Spine Without Contrast; Future  -     cyclobenzaprine (FLEXERIL) 10 MG tablet; Take 1 tablet by mouth 3 (Three) Times a Day As Needed for Muscle Spasms.  -     Ambulatory Referral to Physical Therapy Evaluate and treat      Back exercises given.  nsaids and flexeril PRN.  Will get MRI then refer as indicated to pain management or neurosurgery.    Outpatient Medications Prior to Visit   Medication Sig Dispense Refill   • acetaminophen (TYLENOL) 500 MG tablet Take  1,000 mg by mouth 2 (Two) Times a Day.     • aspirin 81 MG tablet Take 1 tablet by mouth daily.     • atorvastatin (LIPITOR) 40 MG tablet Take 1 tablet by mouth Every Night. 90 tablet 1   • carvedilol (COREG) 3.125 MG tablet TAKE ONE TABLET BY MOUTH TWICE A  tablet 1   • dicyclomine (BENTYL) 20 MG tablet Take 1 tablet by mouth Every 6 (Six) Hours As Needed (cramping). 20 tablet 0   • insulin NPH (humuLIN N,novoLIN N) 100 UNIT/ML injection Inject 32 Units under the skin Every Night.     • insulin regular (humuLIN R,novoLIN R) 100 UNIT/ML injection Inject 15 Units under the skin 3 (Three) Times a Day Before Meals. 15-20 units at breakfast   15-20 units at lunch   25-45 units at dinner     • lisinopril (PRINIVIL,ZESTRIL) 40 MG tablet TAKE ONE TABLET BY MOUTH DAILY 90 tablet 1   • meclizine (ANTIVERT) 25 MG tablet Take 1 tablet by mouth 3 (Three) Times a Day As Needed for dizziness or Nausea for up to 30 doses. 30 tablet 0   • melatonin 5 MG tablet tablet Take 5 mg by mouth.     • pantoprazole (PROTONIX) 40 MG EC tablet Take 1 tablet by mouth Daily. 90 tablet 1   • WAVESENSE PRESTO test strip USE ONE STRIP TO TEST FOUR TIMES A  each 4   • gabapentin (NEURONTIN) 300 MG capsule Take 1 capsule by mouth 3 (Three) Times a Day. 90 capsule 0   • pregabalin (LYRICA) 75 MG capsule Take 1 capsule by mouth Daily. 30 capsule 1     No facility-administered medications prior to visit.      New Medications Ordered This Visit   Medications   • cyclobenzaprine (FLEXERIL) 10 MG tablet     Sig: Take 1 tablet by mouth 3 (Three) Times a Day As Needed for Muscle Spasms.     Dispense:  30 tablet     Refill:  0     [unfilled]  Medications Discontinued During This Encounter   Medication Reason   • pregabalin (LYRICA) 75 MG capsule *Therapy completed         Return in about 4 weeks (around 8/5/2019) for Recheck.

## 2019-07-08 NOTE — PATIENT INSTRUCTIONS
Back Exercises  The following exercises strengthen the muscles that help to support the back. They also help to keep the lower back flexible. Doing these exercises can help to prevent back pain or lessen existing pain.  If you have back pain or discomfort, try doing these exercises 2-3 times each day or as told by your health care provider. When the pain goes away, do them once each day, but increase the number of times that you repeat the steps for each exercise (do more repetitions). If you do not have back pain or discomfort, do these exercises once each day or as told by your health care provider.  Exercises  Single Knee to Chest  Repeat these steps 3-5 times for each le. Lie on your back on a firm bed or the floor with your legs extended.  2. Bring one knee to your chest. Your other leg should stay extended and in contact with the floor.  3. Hold your knee in place by grabbing your knee or thigh.  4. Pull on your knee until you feel a gentle stretch in your lower back.  5. Hold the stretch for 10-30 seconds.  6. Slowly release and straighten your leg.    Pelvic Tilt  Repeat these steps 5-10 times:  1. Lie on your back on a firm bed or the floor with your legs extended.  2. Bend your knees so they are pointing toward the ceiling and your feet are flat on the floor.  3. Tighten your lower abdominal muscles to press your lower back against the floor. This motion will tilt your pelvis so your tailbone points up toward the ceiling instead of pointing to your feet or the floor.  4. With gentle tension and even breathing, hold this position for 5-10 seconds.    Cat-Cow    Repeat these steps until your lower back becomes more flexible:  1. Get into a hands-and-knees position on a firm surface. Keep your hands under your shoulders, and keep your knees under your hips. You may place padding under your knees for comfort.  2. Let your head hang down, and point your tailbone toward the floor so your lower back becomes  rounded like the back of a cat.  3. Hold this position for 5 seconds.  4. Slowly lift your head and point your tailbone up toward the ceiling so your back forms a sagging arch like the back of a cow.  5. Hold this position for 5 seconds.    Press-Ups    Repeat these steps 5-10 times:  1. Lie on your abdomen (face-down) on the floor.  2. Place your palms near your head, about shoulder-width apart.  3. While you keep your back as relaxed as possible and keep your hips on the floor, slowly straighten your arms to raise the top half of your body and lift your shoulders. Do not use your back muscles to raise your upper torso. You may adjust the placement of your hands to make yourself more comfortable.  4. Hold this position for 5 seconds while you keep your back relaxed.  5. Slowly return to lying flat on the floor.    Bridges    Repeat these steps 10 times:  1. Lie on your back on a firm surface.  2. Bend your knees so they are pointing toward the ceiling and your feet are flat on the floor.  3. Tighten your buttocks muscles and lift your buttocks off of the floor until your waist is at almost the same height as your knees. You should feel the muscles working in your buttocks and the back of your thighs. If you do not feel these muscles, slide your feet 1-2 inches farther away from your buttocks.  4. Hold this position for 3-5 seconds.  5. Slowly lower your hips to the starting position, and allow your buttocks muscles to relax completely.    If this exercise is too easy, try doing it with your arms crossed over your chest.  Abdominal Crunches  Repeat these steps 5-10 times:  1. Lie on your back on a firm bed or the floor with your legs extended.  2. Bend your knees so they are pointing toward the ceiling and your feet are flat on the floor.  3. Cross your arms over your chest.  4. Tip your chin slightly toward your chest without bending your neck.  5. Tighten your abdominal muscles and slowly raise your trunk (torso)  high enough to lift your shoulder blades a tiny bit off of the floor. Avoid raising your torso higher than that, because it can put too much stress on your low back and it does not help to strengthen your abdominal muscles.  6. Slowly return to your starting position.    Back Lifts  Repeat these steps 5-10 times:  1. Lie on your abdomen (face-down) with your arms at your sides, and rest your forehead on the floor.  2. Tighten the muscles in your legs and your buttocks.  3. Slowly lift your chest off of the floor while you keep your hips pressed to the floor. Keep the back of your head in line with the curve in your back. Your eyes should be looking at the floor.  4. Hold this position for 3-5 seconds.  5. Slowly return to your starting position.    Contact a health care provider if:  · Your back pain or discomfort gets much worse when you do an exercise.  · Your back pain or discomfort does not lessen within 2 hours after you exercise.  If you have any of these problems, stop doing these exercises right away. Do not do them again unless your health care provider says that you can.  Get help right away if:  · You develop sudden, severe back pain. If this happens, stop doing the exercises right away. Do not do them again unless your health care provider says that you can.  This information is not intended to replace advice given to you by your health care provider. Make sure you discuss any questions you have with your health care provider.  Document Released: 01/25/2006 Document Revised: 07/31/2018 Document Reviewed: 02/11/2016  [employer] Patient Education © 2019 [employer]

## 2019-07-21 ENCOUNTER — HOSPITAL ENCOUNTER (EMERGENCY)
Facility: HOSPITAL | Age: 59
Discharge: HOME | End: 2019-07-21
Attending: EMERGENCY MEDICINE | Admitting: EMERGENCY MEDICINE

## 2019-07-21 VITALS
WEIGHT: 210 LBS | DIASTOLIC BLOOD PRESSURE: 80 MMHG | OXYGEN SATURATION: 96 % | HEIGHT: 65 IN | RESPIRATION RATE: 20 BRPM | HEART RATE: 95 BPM | SYSTOLIC BLOOD PRESSURE: 155 MMHG | TEMPERATURE: 98 F | BODY MASS INDEX: 34.99 KG/M2
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
## 2019-07-21 DIAGNOSIS — IMO0001 HYMENOPTERA STING, ACCIDENTAL OR UNINTENTIONAL, INITIAL ENCOUNTER: Primary | ICD-10-CM

## 2019-07-21 PROCEDURE — 99283 EMERGENCY DEPT VISIT LOW MDM: CPT

## 2019-07-21 PROCEDURE — 25010000002 FENTANYL CITRATE (PF) 100 MCG/2ML SOLUTION: Performed by: EMERGENCY MEDICINE

## 2019-07-21 PROCEDURE — 96375 TX/PRO/DX INJ NEW DRUG ADDON: CPT

## 2019-07-21 PROCEDURE — 96374 THER/PROPH/DIAG INJ IV PUSH: CPT

## 2019-07-21 PROCEDURE — 99282 EMERGENCY DEPT VISIT SF MDM: CPT | Performed by: EMERGENCY MEDICINE

## 2019-07-21 PROCEDURE — 25010000002 DIPHENHYDRAMINE PER 50 MG: Performed by: EMERGENCY MEDICINE

## 2019-07-21 RX ORDER — FENTANYL CITRATE 50 UG/ML
50 INJECTION, SOLUTION INTRAMUSCULAR; INTRAVENOUS ONCE
Status: COMPLETED | OUTPATIENT
Start: 2019-07-21 | End: 2019-07-21

## 2019-07-21 RX ORDER — FAMOTIDINE 10 MG/ML
20 INJECTION, SOLUTION INTRAVENOUS ONCE
Status: COMPLETED | OUTPATIENT
Start: 2019-07-21 | End: 2019-07-21

## 2019-07-21 RX ORDER — DIPHENHYDRAMINE HCL 25 MG
25 CAPSULE ORAL EVERY 8 HOURS
Qty: 15 CAPSULE | Refills: 0 | Status: SHIPPED | OUTPATIENT
Start: 2019-07-21 | End: 2019-08-08 | Stop reason: ALTCHOICE

## 2019-07-21 RX ORDER — HYDROCODONE BITARTRATE AND ACETAMINOPHEN 5; 325 MG/1; MG/1
1 TABLET ORAL ONCE
Status: COMPLETED | OUTPATIENT
Start: 2019-07-21 | End: 2019-07-21

## 2019-07-21 RX ORDER — SODIUM CHLORIDE 0.9 % (FLUSH) 0.9 %
10 SYRINGE (ML) INJECTION AS NEEDED
Status: DISCONTINUED | OUTPATIENT
Start: 2019-07-21 | End: 2019-07-22 | Stop reason: HOSPADM

## 2019-07-21 RX ORDER — DIPHENHYDRAMINE HYDROCHLORIDE 50 MG/ML
25 INJECTION INTRAMUSCULAR; INTRAVENOUS ONCE
Status: COMPLETED | OUTPATIENT
Start: 2019-07-21 | End: 2019-07-21

## 2019-07-21 RX ADMIN — FENTANYL CITRATE 50 MCG: 50 INJECTION INTRAMUSCULAR; INTRAVENOUS at 21:16

## 2019-07-21 RX ADMIN — HYDROCODONE BITARTRATE AND ACETAMINOPHEN 1 TABLET: 5; 325 TABLET ORAL at 22:21

## 2019-07-21 RX ADMIN — DIPHENHYDRAMINE HYDROCHLORIDE 25 MG: 50 INJECTION, SOLUTION INTRAMUSCULAR; INTRAVENOUS at 21:16

## 2019-07-21 RX ADMIN — FAMOTIDINE 20 MG: 10 INJECTION, SOLUTION INTRAVENOUS at 21:15

## 2019-07-22 ENCOUNTER — HOSPITAL ENCOUNTER (OUTPATIENT)
Dept: MRI IMAGING | Facility: HOSPITAL | Age: 59
Discharge: HOME | End: 2019-07-22
Admitting: INTERNAL MEDICINE

## 2019-07-22 DIAGNOSIS — M54.42 CHRONIC LEFT-SIDED LOW BACK PAIN WITH LEFT-SIDED SCIATICA: ICD-10-CM

## 2019-07-22 DIAGNOSIS — M54.17 LUMBOSACRAL RADICULOPATHY AT S1: ICD-10-CM

## 2019-07-22 DIAGNOSIS — G89.29 CHRONIC LEFT-SIDED LOW BACK PAIN WITH LEFT-SIDED SCIATICA: ICD-10-CM

## 2019-07-22 PROCEDURE — 72148 MRI LUMBAR SPINE W/O DYE: CPT

## 2019-07-22 RX ORDER — PANTOPRAZOLE SODIUM 40 MG/1
TABLET, DELAYED RELEASE ORAL
Qty: 30 TABLET | Refills: 5 | Status: SHIPPED | OUTPATIENT
Start: 2019-07-22 | End: 2019-08-08 | Stop reason: ALTCHOICE

## 2019-07-24 DIAGNOSIS — M48.061 SPINAL STENOSIS OF LUMBAR REGION, UNSPECIFIED WHETHER NEUROGENIC CLAUDICATION PRESENT: Primary | ICD-10-CM

## 2019-08-02 ENCOUNTER — OFFICE VISIT (OUTPATIENT)
Dept: NEUROSURGERY | Facility: CLINIC | Age: 59
End: 2019-08-02

## 2019-08-02 VITALS
WEIGHT: 210 LBS | DIASTOLIC BLOOD PRESSURE: 92 MMHG | BODY MASS INDEX: 34.99 KG/M2 | HEIGHT: 65 IN | SYSTOLIC BLOOD PRESSURE: 138 MMHG

## 2019-08-02 DIAGNOSIS — M48.062 SPINAL STENOSIS, LUMBAR REGION, WITH NEUROGENIC CLAUDICATION: ICD-10-CM

## 2019-08-02 DIAGNOSIS — M43.16 SPONDYLOLISTHESIS OF LUMBAR REGION: Primary | ICD-10-CM

## 2019-08-02 PROCEDURE — 99244 OFF/OP CNSLTJ NEW/EST MOD 40: CPT | Performed by: NEUROLOGICAL SURGERY

## 2019-08-05 ENCOUNTER — HOSPITAL ENCOUNTER (OUTPATIENT)
Dept: GENERAL RADIOLOGY | Facility: HOSPITAL | Age: 59
Discharge: HOME | End: 2019-08-05
Admitting: NEUROLOGICAL SURGERY

## 2019-08-05 DIAGNOSIS — M48.062 SPINAL STENOSIS, LUMBAR REGION, WITH NEUROGENIC CLAUDICATION: ICD-10-CM

## 2019-08-05 DIAGNOSIS — M43.16 SPONDYLOLISTHESIS OF LUMBAR REGION: ICD-10-CM

## 2019-08-05 PROCEDURE — 72050 X-RAY EXAM NECK SPINE 4/5VWS: CPT

## 2019-08-06 ENCOUNTER — HOSPITAL ENCOUNTER (OUTPATIENT)
Dept: GENERAL RADIOLOGY | Facility: HOSPITAL | Age: 59
Discharge: HOME | End: 2019-08-06
Admitting: NEUROLOGICAL SURGERY

## 2019-08-06 DIAGNOSIS — M48.062 SPINAL STENOSIS, LUMBAR REGION, WITH NEUROGENIC CLAUDICATION: ICD-10-CM

## 2019-08-06 DIAGNOSIS — M43.16 SPONDYLOLISTHESIS OF LUMBAR REGION: ICD-10-CM

## 2019-08-06 PROCEDURE — 72114 X-RAY EXAM L-S SPINE BENDING: CPT

## 2019-08-07 NOTE — PROGRESS NOTES
Subjective:     Encounter Date:08/08/2019      Patient ID: Kristine Rivas is a 59 y.o. female.    Chief Complaint: Cardiac risk assessment  History of Present Illness     She is a new patient to me and I reviewed her past medical records.  She is a patient of Dr. Blackwood.  It is been a year and a half since she was last seen in the office.  History brought forth for continuity.  The patient was first seen at [facility] on 07/11/2014, with a  non-ST-elevation myocardial infarction. She was having severe chest pressure going into  her neck. She has a known history of hypertension, hyperlipidemia, and diabetes mellitus  type 2, but she was very limited in the medication that she could get, so she was only  buying her insulin at the time when all this happened. 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 She takes  care of her elderly mother and her children and finances are very tight. When the chest  pressure would not go away, her neighbor advised her to go to the emergency department  there. She ruled in for a myocardial infarction. She was smoking a pack of cigarettes a  day when she first came to the hospital. She was then transferred semi-emergently to  [facility] where she underwent a cardiac catheterization. This was done on  07/11/2014, and showed an ejection fraction of 60%, 50% distal left main stenosis, 95% mid  left anterior descending stenosis, 80% diagonal stenosis, 90% diffuse circumflex stenosis,  50% right coronary artery stenosis followed by a distal 90% with thrombus. The posterior  descending artery was occluded. She went on to have coronary artery bypass grafting done  on 07/14/2014, receiving a left internal mammary artery to left anterior descending,  saphenous vein graft sequential to the first diagonal and then to the second diagonal,  saphenous vein graft to obtuse marginal, saphenous vein graft to posterior descending  artery, and saphenous vein graft to posterior left ventricle  sequential then to the obtuse  marginal two. Postoperatively, she did well.      She had a stress nuclear perfusion study in 04/2015 for chest pain and that was normal showing no evidence of ischemia.       She was in the emergency department on 06/22/2016 with exertional chest pressure and difficulty breathing. She ruled out for a myocardial infarction. Her ECG was unremarkable.     Her mother passed away peacefully in January 2017.  She was her caregiver.  She said it was a blessing to watch her pass peacefully.     She presents today, 8/8/19, for cardiac risk assessment for back surgery.  She states other than her back she feels great.  She is currently working for the school board.  She denies any palpitations, shortness of breath, chest pain or chest tightness.  She is had no episodes of lightheadedness or dizziness.  She will occasionally have some lower extremity edema if she is on her feet for a long time.  She does complain of fatigue.  Her back is so painful she has to constantly change positions from standing to sitting.  The surgery is not scheduled yet, she is waiting for her cardiac risk assessment.  This will be performed at [facility] by Dr. Wells.    She had a nuclear stress test performed 7/13/2016 that showed:    · Myocardial perfusion imaging indicates a normal myocardial perfusion study with no evidence of ischemia.  · Left ventricular ejection fraction is normal (Calculated EF = 60%).  · Breast attenuation artifact is present.  · Impressions are consistent with a low risk study.     The following portions of the patient's history were reviewed and updated as appropriate: allergies, current medications, past family history, past medical history, past social history, past surgical history and problem list.    Review of Systems   Constitution: Negative for weakness and malaise/fatigue.   HENT: Negative for congestion, hoarse voice and sore throat.    Eyes: Negative for blurred vision, double vision,  photophobia, vision loss in left eye and vision loss in right eye.   Cardiovascular: Negative for chest pain, dyspnea on exertion, irregular heartbeat, leg swelling, near-syncope, orthopnea, palpitations, paroxysmal nocturnal dyspnea and syncope.   Respiratory: Negative for cough, hemoptysis, shortness of breath, sleep disturbances due to breathing, snoring, sputum production and wheezing.    Endocrine: Negative.    Hematologic/Lymphatic: Does not bruise/bleed easily.   Skin: Negative for color change, dry skin, poor wound healing and rash.   Musculoskeletal: Negative for back pain, falls, gout, joint pain, joint swelling, muscle cramps and muscle weakness.   Gastrointestinal: Negative for abdominal pain, constipation, diarrhea, dysphagia, melena, nausea and vomiting.   Neurological: Negative for excessive daytime sleepiness, dizziness, headaches, light-headedness, loss of balance, numbness, paresthesias, seizures and vertigo.   Psychiatric/Behavioral: Negative for depression and substance abuse. The patient is not nervous/anxious.        ECG 12 Lead  Date/Time: 8/8/2019 12:13 PM  Performed by: Keila Goodrich APRN  Authorized by: Keila Goodrich APRN   Comparison: compared with previous ECG from 10/2/2017  Similar to previous ECG  Rhythm: sinus rhythm  Rate: normal  Conduction: incomplete right bundle branch block  ST Segments: ST segments normal  T flattening: aVL  QRS axis: normal    Clinical impression: abnormal EKG               Objective:         Physical Exam   Constitutional: He is oriented to person, place, and time. Vital signs are normal. He appears well-developed and well-nourished. No distress.   HENT:   Head: Normocephalic and atraumatic.   Right Ear: Hearing normal.   Left Ear: Hearing normal.   Eyes: Conjunctivae and lids are normal.   Neck: Normal range of motion. Neck supple. No JVD present. Carotid bruit is not present. No thyromegaly present.   Cardiovascular: Normal rate, regular rhythm, S1  "normal, S2 normal, normal heart sounds and intact distal pulses.  PMI is not displaced.  Exam reveals no gallop.    No murmur heard.  Pulses:       Carotid pulses are 2+ on the right side, and 2+ on the left side.       Radial pulses are 2+ on the right side, and 2+ on the left side.        Dorsalis pedis pulses are 2+ on the right side, and 2+ on the left side.        Posterior tibial pulses are 2+ on the right side, and 2+ on the left side.   Pulmonary/Chest: Effort normal and breath sounds normal. No respiratory distress. He has no wheezes. He has no rhonchi. He has no rales. He exhibits no tenderness.   Abdominal: Soft. Normal appearance and bowel sounds are normal. He exhibits no distension, no abdominal bruit and no mass. There is no tenderness.   Musculoskeletal: Normal range of motion.   Exhibits no edema or deformity   Lymphadenopathy:     He has no cervical adenopathy.   Neurological: He is alert and oriented to person, place, and time. No cranial nerve deficit. Coordination and gait normal.   Oriented to person, place and time.   Skin: Skin is warm, dry and intact. No rash noted. He is not diaphoretic. No cyanosis. Nails show no clubbing.   Psychiatric: He has a normal mood and affect. His speech is normal and behavior is normal. Judgment and thought content normal. Cognition and memory are normal.     Vitals:    08/08/19 1044   BP: 132/94   Pulse: 74   Weight: 102 kg (225 lb)   Height: 172.7 cm (68\")           Lab Review:       Assessment:          Diagnosis Plan   1. Chronic coronary artery disease     2. Hyperlipidemia, unspecified hyperlipidemia type     3. Essential hypertension     4. Spinal stenosis, lumbar region, with neurogenic claudication     5. Preop cardiovascular exam            Plan:       1.  CAD - NSTEMI 7/14 s/p CABG x7. She has her angina equivalent, normal stress nuclear at E 4/2015. She went to the ED 6/22/16 for exertional chest pressure. She had a normal w/u and a normal Lexiscan " nuclear stress study 7/2016. No angina.  Coronary Artery Disease  Assessment  • The patient has no angina    Plan  • Lifestyle modifications discussed include adhering to a heart healthy diet, avoidance of tobacco products, maintenance of a healthy weight, medication compliance, regular exercise and regular monitoring of cholesterol and blood pressure    Subjective - Objective  • There is a history of past MI  • There is a history of previous coronary artery bypass graft  • Current antiplatelet therapy includes aspirin 81 mg    2.  Hyperlipidemia - continue on lipid lowering therapy    3.  Essential HTN - normally well controlled.  Elevated at visit today, she is very uncomfortable with back pain    4.  Spinal stenosis, lumbar region, with neurogenic claudication - surgery has not been scheduled yet.  To be performed by Dr. Wells at [facility]    5.  Preop cardioversion exam - Normal EF at 60% no chest pain.  No further cardiac testing is required at this time.    6.  Normal EF at 60%    7.  DM Type II - sees Dr. Duckworth    Per revised Cardiac Risk Index (Glenn Criteria) her estimated risk of adverse outcome with noncardiac surgery is a moderate risk.  Her estimated rate of myocardial infarction, pulmonary edema, ventricular fibrillation, cardiac arrest, or complete heart block is 6.6%.    RTO in 1 year with RM    CARYN Vallejo      Current Outpatient Medications:   •  acetaminophen (TYLENOL) 500 MG tablet, Take 1,000 mg by mouth Every 6 (Six) Hours As Needed for Mild Pain ., Disp: , Rfl:   •  aspirin 81 MG tablet, Take 1 tablet by mouth daily., Disp: , Rfl:   •  atorvastatin (LIPITOR) 40 MG tablet, Take 1 tablet by mouth Every Night., Disp: 90 tablet, Rfl: 1  •  carvedilol (COREG) 3.125 MG tablet, TAKE ONE TABLET BY MOUTH TWICE A DAY, Disp: 180 tablet, Rfl: 1  •  insulin NPH (humuLIN N,novoLIN N) 100 UNIT/ML injection, Inject 32 Units under the skin Every Night., Disp: , Rfl:   •  insulin regular (humuLIN R,novoLIN R)  100 UNIT/ML injection, Inject 15 Units under the skin 3 (Three) Times a Day Before Meals. 15-20 units at breakfast  15-20 units at lunch  25-45 units at dinner, Disp: , Rfl:   •  lisinopril (PRINIVIL,ZESTRIL) 40 MG tablet, TAKE ONE TABLET BY MOUTH DAILY, Disp: 90 tablet, Rfl: 1  •  melatonin 5 MG tablet tablet, Take 5 mg by mouth., Disp: , Rfl:   •  pantoprazole (PROTONIX) 40 MG EC tablet, Take 1 tablet by mouth Daily., Disp: 90 tablet, Rfl: 1  •  WAVESENSE PRESTO test strip, USE ONE STRIP TO TEST FOUR TIMES A DAY, Disp: 360 each, Rfl: 4

## 2019-08-08 ENCOUNTER — OFFICE VISIT (OUTPATIENT)
Dept: CARDIOLOGY | Facility: CLINIC | Age: 59
End: 2019-08-08

## 2019-08-08 VITALS
SYSTOLIC BLOOD PRESSURE: 132 MMHG | WEIGHT: 225 LBS | DIASTOLIC BLOOD PRESSURE: 94 MMHG | HEART RATE: 74 BPM | BODY MASS INDEX: 34.1 KG/M2 | HEIGHT: 68 IN

## 2019-08-08 DIAGNOSIS — E78.5 HYPERLIPIDEMIA, UNSPECIFIED HYPERLIPIDEMIA TYPE: ICD-10-CM

## 2019-08-08 DIAGNOSIS — M48.062 SPINAL STENOSIS, LUMBAR REGION, WITH NEUROGENIC CLAUDICATION: ICD-10-CM

## 2019-08-08 DIAGNOSIS — I25.10 CHRONIC CORONARY ARTERY DISEASE: Primary | ICD-10-CM

## 2019-08-08 DIAGNOSIS — Z01.810 PREOP CARDIOVASCULAR EXAM: ICD-10-CM

## 2019-08-08 DIAGNOSIS — I10 ESSENTIAL HYPERTENSION: ICD-10-CM

## 2019-08-08 PROCEDURE — 93000 ELECTROCARDIOGRAM COMPLETE: CPT | Performed by: NURSE PRACTITIONER

## 2019-08-08 PROCEDURE — 99214 OFFICE O/P EST MOD 30 MIN: CPT | Performed by: NURSE PRACTITIONER

## 2019-08-12 NOTE — PROGRESS NOTES
Subjective   Patient ID: Kristine Rivas is a 59 y.o. female is here today for follow-up for back and bilateral leg pain with new XR. She was seen by CARYN Carvajal for follow up of cardiac clearance and to review surgical recommendations.     She has been seen for consultation for lower back pain that radiates into bilateral legs but left leg is worse.  This is been aggravated severely for the last 2 years but probably been going on longer than that.  Patient reports numbness and tingling primarily in her left leg. Patient is taking Flexeril 10 mg BID. She is also using a gabapentin compound cream.  He has been evaluated for plantar fasciitis, musculoskeletal disorder, statin side effects and most recently got an EMG/NCV study which suggested it may be coming from lumbar stenosis.      Back Pain   The current episode started more than 1 year ago. The problem occurs constantly. The problem has been gradually worsening since onset. The pain is present in the lumbar spine. The quality of the pain is described as aching, burning, cramping and shooting. The pain radiates to the right thigh, left knee, left thigh and left foot. The pain is at a severity of 10/10. The pain is severe. The symptoms are aggravated by position, sitting and standing. Associated symptoms include leg pain, numbness, tingling and weakness.       The following portions of the patient's history were reviewed and updated as appropriate: allergies, current medications, past family history, past medical history, past social history, past surgical history and problem list.    Review of Systems   HENT: Negative.    Eyes: Negative.    Respiratory: Negative.    Cardiovascular: Negative.    Gastrointestinal: Negative.    Endocrine: Negative.    Genitourinary: Negative.    Musculoskeletal: Positive for back pain.   Skin: Negative.    Allergic/Immunologic: Negative.    Neurological: Positive for tingling, weakness and numbness.   Hematological: Negative.  "   Psychiatric/Behavioral: Negative.        Objective     Vitals:    08/13/19 1322   BP: 170/100   Weight: 102 kg (225 lb)   Height: 172.7 cm (68\")     Body mass index is 34.21 kg/m².      Physical Exam  Neurologic Exam     Physical Exam:     CONSTITUTIONAL: This 59 year old right handed  female appears well developed, well-nourished and in extreme discomfort having to walk around the room because a constant position aggravates pain in her back and left greater than right leg.     HEAD & FACE: the head and face are symmetric, normocephalic and atraumatic.     EYES: Inspection of the conjunctivae and lids reveals no swelling, erythema or discharge.  Pupils are round, equal and reactive to light and there is no scleral icterus.     EARS, NOSE, MOUTH & THROAT: On inspection, the ears and nose are within normal limits.     NECK: the neck is supple and symmetric. The trachea is midline with no masses.     PULMONARY: Respiratory effort is normal with no increased work of breathing or signs of respiratory distress.     CARDIOVASCULAR: Pedal pulses are +2/4 bilaterally. Examination of the extremities shows no edema or varicosities.     LYMPHATIC: There is no palpable lymphadenopathy of the neck.     MUSCULOSKELETAL: Gait and station are within normal limits. The spine has normal alignment and range of motion,     SKIN: The skin is warm, dry and intact     NEUROLOGIC:   Cranial Nerves 2-12 intact  Normal motor strength noted. Muscle bulk and tone are normal.  Sensory exam reveals loss of L5 sensation on the left.  Reflexes on the right side demonstrates 1/4 Triceps Reflex, 1/4 Biceps Reflex, 0/4 Brachioradialis Reflex, 0/4 Knee Jerk Reflex, 0/4 Ankle Jerk Reflex and no ankle clonus on the right.   Reflexes on the left side demonstrates 1/4 Triceps Reflex, 1/4 Biceps Reflex, 0/4 Brachioradialis Reflex, 0/4 Knee Jerk Reflex, 0/4 Ankle Jerk Reflex and no ankle clonus on the left.  Superficial/Primitive Reflexes: " primitive reflexes were absent.  No coordination deficit observed.  Radicular testing showed a negative Gerson (SHEREE) test and positive straight leg raise on the left  Cortical function is intact and without deficits. Speech is normal.     PSYCHIATRIC: oriented to person, place and time. Patient's mood and affect are normal.    Assessment/Plan   Independent Review of Radiographic Studies:      I personally reviewed the images from the following studies.    Lumbar plain films done on 8/6/2019 [facility] feels grade 1 anterolisthesis which does increase in flexion consistent with our suspicion for instability.    Plan films of the cervical spine done on 8/5/2019 at [facility] revealed degenerative changes with no areas of deformity or fracture    Also had the opportunity review the images of an MRI of the lumbar spine done on 7/22/2019 at [facility].  The predominant finding is anterolisthesis at L4-5 with facet arthropathy and severe disc bulge with severe canal stenosis, and moderate bilateral foraminal stenosis.    Medical Decision Making:      As I suspected the flexion-extension films in comparison to the MRI at our he had the opportunity to review the lumbar spine reveals instability contributing to her symptomatology.  So she not only has severe lumbar stenosis at L4-5 but she also has instability.  She has been assessed for cardiac risk by the cardiologist which is felt to be acceptable.      Therefore, the surgical recommendation is for a lumbar decompressive laminectomy at L4-L5 with interbody fusion with instrumentation and posterior lateral fusion with instrumentation.  A Lumbar Laminectomy involves a small skin incision localized over the affected spinal area which is confirmed by X-ray. Then the thickened bone is removed off the back of the spine with a drill to expose the nerves. Any thickened bone along the disc or joints are removed also to make more room around the  nerves.  Once the decompression is complete then the disc is replaced with an interbody carbon fiber device which is filled with allograft bone to facilitate bony fusion across the vertebral bodies.  To provide extra stability pedicle screw fixation occurs bilaterally and allograft bone is placed along the lateral recesses of the transverse processes and facet joints.      The patient understands that there are inherent risks to surgery including bleeding, infection, anesthetic risk, death, heart attack, stroke, damage to nerves, weakness, numbness, paralysis, failure to improve, need for further surgery, CSF leak, recurrence, persistent pain, and any other unforeseen complications. They comprehend and had opportunity to ask further questions.    Kristine was seen today for follow-up and back pain.    Diagnoses and all orders for this visit:    Spinal stenosis, lumbar region, with neurogenic claudication  -     Back Brace  -     Case Request; Standing  -     ceFAZolin (ANCEF) 2 g in sodium chloride 0.9 % 100 mL IVPB  -     Type & Screen; Future  -     Case Request    Spondylolisthesis of lumbar region  -     Back Brace  -     Case Request; Standing  -     ceFAZolin (ANCEF) 2 g in sodium chloride 0.9 % 100 mL IVPB  -     Type & Screen; Future  -     Case Request    Other orders  -     Follow anesthesia standing orders.  -     Obtain informed consent  -     Provide NPO Instructions to Patient; Future  -     Follow anesthesia standing orders.; Standing  -     Verify NPO Status; Standing  -     Obtain Informed Consent; Standing  -     SCD (sequential compression device)- to be placed on patient in Pre-op; Standing  -     Verify/Perform Chlorhexidine Skin Prep; Standing  -     Instructions on Coughing, Deep Breathing & Incentive Spirometry; Standing  -     Inpatient Admission; Standing      Return for follow-up after surgery.

## 2019-08-13 ENCOUNTER — OFFICE VISIT (OUTPATIENT)
Dept: NEUROSURGERY | Facility: CLINIC | Age: 59
End: 2019-08-13

## 2019-08-13 VITALS
DIASTOLIC BLOOD PRESSURE: 100 MMHG | SYSTOLIC BLOOD PRESSURE: 170 MMHG | HEIGHT: 68 IN | BODY MASS INDEX: 34.1 KG/M2 | WEIGHT: 225 LBS

## 2019-08-13 DIAGNOSIS — M43.16 SPONDYLOLISTHESIS OF LUMBAR REGION: ICD-10-CM

## 2019-08-13 DIAGNOSIS — M48.062 SPINAL STENOSIS, LUMBAR REGION, WITH NEUROGENIC CLAUDICATION: Primary | ICD-10-CM

## 2019-08-13 PROCEDURE — 99214 OFFICE O/P EST MOD 30 MIN: CPT | Performed by: NEUROLOGICAL SURGERY

## 2019-08-13 RX ORDER — CEFAZOLIN SODIUM 2 G/100ML
2 INJECTION, SOLUTION INTRAVENOUS ONCE
Status: CANCELLED | OUTPATIENT
Start: 2019-08-21 | End: 2019-08-13

## 2019-08-14 ENCOUNTER — APPOINTMENT (OUTPATIENT)
Dept: PREADMISSION TESTING | Facility: HOSPITAL | Age: 59
End: 2019-08-14

## 2019-08-14 VITALS
SYSTOLIC BLOOD PRESSURE: 151 MMHG | TEMPERATURE: 97.9 F | WEIGHT: 224 LBS | RESPIRATION RATE: 16 BRPM | OXYGEN SATURATION: 99 % | HEART RATE: 75 BPM | HEIGHT: 68 IN | DIASTOLIC BLOOD PRESSURE: 86 MMHG | BODY MASS INDEX: 33.95 KG/M2

## 2019-08-14 DIAGNOSIS — M43.16 SPONDYLOLISTHESIS OF LUMBAR REGION: ICD-10-CM

## 2019-08-14 DIAGNOSIS — M48.062 SPINAL STENOSIS, LUMBAR REGION, WITH NEUROGENIC CLAUDICATION: ICD-10-CM

## 2019-08-14 LAB
ABO GROUP BLD: NORMAL
ALBUMIN SERPL-MCNC: 4.5 G/DL (ref 3.5–5.2)
ALBUMIN/GLOB SERPL: 1.7 G/DL
ALP SERPL-CCNC: 100 U/L (ref 39–117)
ALT SERPL W P-5'-P-CCNC: 21 U/L (ref 1–33)
ANION GAP SERPL CALCULATED.3IONS-SCNC: 15.1 MMOL/L (ref 5–15)
AST SERPL-CCNC: 18 U/L (ref 1–32)
BASOPHILS # BLD AUTO: 0.03 10*3/MM3 (ref 0–0.2)
BASOPHILS NFR BLD AUTO: 0.4 % (ref 0–1.5)
BILIRUB SERPL-MCNC: 0.3 MG/DL (ref 0.2–1.2)
BLD GP AB SCN SERPL QL: NEGATIVE
BUN BLD-MCNC: 13 MG/DL (ref 6–20)
BUN/CREAT SERPL: 18.1 (ref 7–25)
CALCIUM SPEC-SCNC: 9 MG/DL (ref 8.6–10.5)
CHLORIDE SERPL-SCNC: 104 MMOL/L (ref 98–107)
CO2 SERPL-SCNC: 23.9 MMOL/L (ref 22–29)
CREAT BLD-MCNC: 0.72 MG/DL (ref 0.57–1)
DEPRECATED RDW RBC AUTO: 45.2 FL (ref 37–54)
EOSINOPHIL # BLD AUTO: 0.39 10*3/MM3 (ref 0–0.4)
EOSINOPHIL NFR BLD AUTO: 5 % (ref 0.3–6.2)
ERYTHROCYTE [DISTWIDTH] IN BLOOD BY AUTOMATED COUNT: 14.4 % (ref 12.3–15.4)
GFR SERPL CREATININE-BSD FRML MDRD: 83 ML/MIN/1.73
GLOBULIN UR ELPH-MCNC: 2.6 GM/DL
GLUCOSE BLD-MCNC: 145 MG/DL (ref 65–99)
HCT VFR BLD AUTO: 42.4 % (ref 34–46.6)
HGB BLD-MCNC: 13.2 G/DL (ref 12–15.9)
IMM GRANULOCYTES # BLD AUTO: 0.02 10*3/MM3 (ref 0–0.05)
IMM GRANULOCYTES NFR BLD AUTO: 0.3 % (ref 0–0.5)
LYMPHOCYTES # BLD AUTO: 2.79 10*3/MM3 (ref 0.7–3.1)
LYMPHOCYTES NFR BLD AUTO: 35.7 % (ref 19.6–45.3)
MCH RBC QN AUTO: 26.8 PG (ref 26.6–33)
MCHC RBC AUTO-ENTMCNC: 31.1 G/DL (ref 31.5–35.7)
MCV RBC AUTO: 86 FL (ref 79–97)
MONOCYTES # BLD AUTO: 0.54 10*3/MM3 (ref 0.1–0.9)
MONOCYTES NFR BLD AUTO: 6.9 % (ref 5–12)
NEUTROPHILS # BLD AUTO: 4.05 10*3/MM3 (ref 1.7–7)
NEUTROPHILS NFR BLD AUTO: 51.7 % (ref 42.7–76)
NRBC BLD AUTO-RTO: 0 /100 WBC (ref 0–0.2)
PLATELET # BLD AUTO: 305 10*3/MM3 (ref 140–450)
PMV BLD AUTO: 10.9 FL (ref 6–12)
POTASSIUM BLD-SCNC: 4.4 MMOL/L (ref 3.5–5.2)
PROT SERPL-MCNC: 7.1 G/DL (ref 6–8.5)
RBC # BLD AUTO: 4.93 10*6/MM3 (ref 3.77–5.28)
RH BLD: POSITIVE
SODIUM BLD-SCNC: 143 MMOL/L (ref 136–145)
T&S EXPIRATION DATE: NORMAL
WBC NRBC COR # BLD: 7.82 10*3/MM3 (ref 3.4–10.8)

## 2019-08-14 PROCEDURE — 86900 BLOOD TYPING SEROLOGIC ABO: CPT | Performed by: NEUROLOGICAL SURGERY

## 2019-08-14 PROCEDURE — 85025 COMPLETE CBC W/AUTO DIFF WBC: CPT | Performed by: NEUROLOGICAL SURGERY

## 2019-08-14 PROCEDURE — 80053 COMPREHEN METABOLIC PANEL: CPT | Performed by: NEUROLOGICAL SURGERY

## 2019-08-14 PROCEDURE — 36415 COLL VENOUS BLD VENIPUNCTURE: CPT

## 2019-08-14 PROCEDURE — 86850 RBC ANTIBODY SCREEN: CPT | Performed by: NEUROLOGICAL SURGERY

## 2019-08-14 PROCEDURE — 86901 BLOOD TYPING SEROLOGIC RH(D): CPT | Performed by: NEUROLOGICAL SURGERY

## 2019-08-14 RX ORDER — LISINOPRIL 40 MG/1
40 TABLET ORAL DAILY
COMMUNITY
End: 2019-09-26 | Stop reason: SDUPTHER

## 2019-08-14 NOTE — DISCHARGE INSTRUCTIONS
Take the following medications the morning of surgery with a small sip of water:    CARVEDILOL  PANTOPRAZOLE    ARRIVE AT 0530.        General Instructions:  • Do not eat solid food after midnight the night before surgery.  • You may drink clear liquids day of surgery but must stop at least one hour before your hospital arrival time.  • It is beneficial for you to have a clear drink that contains carbohydrates the day of surgery.  We suggest a 12 to 20 ounce bottle of Gatorade or Powerade for non-diabetic patients or a 12 to 20 ounce bottle of G2 or Powerade Zero for diabetic patients. (Pediatric patients, are not advised to drink a 12 to 20 ounce carbohydrate drink)    Clear liquids are liquids you can see through.  Nothing red in color.     Plain water                               Sports drinks  Sodas                                   Gelatin (Jell-O)  Fruit juices without pulp such as white grape juice and apple juice  Popsicles that contain no fruit or yogurt  Tea or coffee (no cream or milk added)  Gatorade / Powerade  G2 / Powerade Zero    • Infants may have breast milk up to four hours before surgery.  • Infants drinking formula may drink formula up to six hours before surgery.   • Patients who avoid smoking, chewing tobacco and alcohol for 4 weeks prior to surgery have a reduced risk of post-operative complications.  Quit smoking as many days before surgery as you can.  • Do not smoke, use chewing tobacco or drink alcohol the day of surgery.   • If applicable bring your C-PAP/ BI-PAP machine.  • Bring any papers given to you in the doctor’s office.  • Wear clean comfortable clothes and socks.  • Do not wear contact lenses, false eyelashes or make-up.  Bring a case for your glasses.   • Bring crutches or walker if applicable.  • Remove all piercings.  Leave jewelry and any other valuables at home.  • Hair extensions with metal clips must be removed prior to surgery.  • The Pre-Admission Testing nurse will  instruct you to bring medications if unable to obtain an accurate list in Pre-Admission Testing.        If you were given a blood bank ID arm band remember to bring it with you the day of surgery.    Preventing a Surgical Site Infection:  • For 2 to 3 days before surgery, avoid shaving with a razor because the razor can irritate skin and make it easier to develop an infection.    • Any areas of open skin can increase the risk of a post-operative wound infection by allowing bacteria to enter and travel throughout the body.  Notify your surgeon if you have any skin wounds / rashes even if it is not near the expected surgical site.  The area will need assessed to determine if surgery should be delayed until it is healed.  • The night prior to surgery sleep in a clean bed with clean clothing.  Do not allow pets to sleep with you.  • Shower on the morning of surgery using a fresh bar of anti-bacterial soap (such as Dial) and clean washcloth.  Dry with a clean towel and dress in clean clothing.  • Ask your surgeon if you will be receiving antibiotics prior to surgery.  • Make sure you, your family, and all healthcare providers clean their hands with soap and water or an alcohol based hand  before caring for you or your wound.    Day of surgery:  Upon arrival, a Pre-op nurse and Anesthesiologist will review your health history, obtain vital signs, and answer questions you may have.  The only belongings needed at this time will be a list of your home medications and if applicable your C-PAP/BI-PAP machine.  If you are staying overnight your family can leave the rest of your belongings in the car and bring them to your room later.  A Pre-op nurse will start an IV and you may receive medication in preparation for surgery, including something to help you relax.  Your family will be able to see you in the Pre-op area.  While you are in surgery your family should notify the waiting room  if they leave the  waiting room area and provide a contact phone number.    Please be aware that surgery does come with discomfort.  We want to make every effort to control your discomfort so please discuss any uncontrolled symptoms with your nurse.   Your doctor will most likely have prescribed pain medications.      If you are going home after surgery you will receive individualized written care instructions before being discharged.  A responsible adult must drive you to and from the hospital on the day of your surgery and stay with you for 24 hours.    If you are staying overnight following surgery, you will be transported to your hospital room following the recovery period.  [facility] has all private rooms.    You have received a list of surgical assistants for your reference.  If you have any questions please call Pre-Admission Testing at [phone].  Deductibles and co-payments are collected on the day of service. Please be prepared to pay the required co-pay, deductible or deposit on the day of service as defined by your plan.

## 2019-08-15 ENCOUNTER — TELEPHONE (OUTPATIENT)
Dept: GASTROENTEROLOGY | Facility: CLINIC | Age: 59
End: 2019-08-15

## 2019-08-15 NOTE — TELEPHONE ENCOUNTER
SPOKE WITH PATIENT.  SCHEDULED [facility] 09/13/2019.  NEEDS TO RESCHEDULE TO 03/2020 BECAUSE HAVING BACK SURGERY SOON.  EXPLAINED WILL CHECK BACK WITH HER 10/2019 TO SCHEDULED FOR 03/2020.  SHE UNDERSTANDS.    SET REMINDER IN MY CALENDAR FOR 10/2019 TO CALL HER.

## 2019-08-21 ENCOUNTER — ANESTHESIA (OUTPATIENT)
Dept: PERIOP | Facility: HOSPITAL | Age: 59
End: 2019-08-21

## 2019-08-21 ENCOUNTER — APPOINTMENT (OUTPATIENT)
Dept: GENERAL RADIOLOGY | Facility: HOSPITAL | Age: 59
End: 2019-08-21

## 2019-08-21 ENCOUNTER — ANESTHESIA EVENT (OUTPATIENT)
Dept: PERIOP | Facility: HOSPITAL | Age: 59
End: 2019-08-21

## 2019-08-21 ENCOUNTER — HOSPITAL ENCOUNTER (INPATIENT)
Facility: HOSPITAL | Age: 59
LOS: 3 days | Discharge: HOME | End: 2019-08-24
Attending: NEUROLOGICAL SURGERY | Admitting: NEUROLOGICAL SURGERY

## 2019-08-21 DIAGNOSIS — M43.16 SPONDYLOLISTHESIS OF LUMBAR REGION: ICD-10-CM

## 2019-08-21 DIAGNOSIS — M48.062 SPINAL STENOSIS, LUMBAR REGION, WITH NEUROGENIC CLAUDICATION: ICD-10-CM

## 2019-08-21 PROBLEM — E66.9 OBESITY (BMI 30-39.9): Status: ACTIVE | Noted: 2019-08-21

## 2019-08-21 LAB
GLUCOSE BLDC GLUCOMTR-MCNC: 174 MG/DL (ref 70–130)
GLUCOSE BLDC GLUCOMTR-MCNC: 185 MG/DL (ref 70–130)
GLUCOSE BLDC GLUCOMTR-MCNC: 201 MG/DL (ref 70–130)
GLUCOSE BLDC GLUCOMTR-MCNC: 229 MG/DL (ref 70–130)

## 2019-08-21 PROCEDURE — 25010000002 ONDANSETRON PER 1 MG: Performed by: NURSE ANESTHETIST, CERTIFIED REGISTERED

## 2019-08-21 PROCEDURE — 25010000002 HYDROMORPHONE PER 4 MG: Performed by: NURSE ANESTHETIST, CERTIFIED REGISTERED

## 2019-08-21 PROCEDURE — 22633 ARTHRD CMBN 1NTRSPC LUMBAR: CPT | Performed by: NEUROLOGICAL SURGERY

## 2019-08-21 PROCEDURE — 63710000001 INSULIN REGULAR HUMAN PER 5 UNITS: Performed by: NEUROLOGICAL SURGERY

## 2019-08-21 PROCEDURE — C1713 ANCHOR/SCREW BN/BN,TIS/BN: HCPCS | Performed by: NEUROLOGICAL SURGERY

## 2019-08-21 PROCEDURE — 22840 INSERT SPINE FIXATION DEVICE: CPT | Performed by: SPECIALIST/TECHNOLOGIST, OTHER

## 2019-08-21 PROCEDURE — 25010000002 PHENYLEPHRINE PER 1 ML: Performed by: NURSE ANESTHETIST, CERTIFIED REGISTERED

## 2019-08-21 PROCEDURE — 82962 GLUCOSE BLOOD TEST: CPT

## 2019-08-21 PROCEDURE — 01NB0ZZ RELEASE LUMBAR NERVE, OPEN APPROACH: ICD-10-PCS | Performed by: NEUROLOGICAL SURGERY

## 2019-08-21 PROCEDURE — 25810000003 SODIUM CHLORIDE 0.9 % WITH KCL 20 MEQ 20-0.9 MEQ/L-% SOLUTION: Performed by: NEUROLOGICAL SURGERY

## 2019-08-21 PROCEDURE — 25010000002 FENTANYL CITRATE (PF) 100 MCG/2ML SOLUTION: Performed by: ANESTHESIOLOGY

## 2019-08-21 PROCEDURE — 0SG00AJ FUSION OF LUMBAR VERTEBRAL JOINT WITH INTERBODY FUSION DEVICE, POSTERIOR APPROACH, ANTERIOR COLUMN, OPEN APPROACH: ICD-10-PCS | Performed by: NEUROLOGICAL SURGERY

## 2019-08-21 PROCEDURE — 22633 ARTHRD CMBN 1NTRSPC LUMBAR: CPT | Performed by: SPECIALIST/TECHNOLOGIST, OTHER

## 2019-08-21 PROCEDURE — 25010000003 CEFAZOLIN IN DEXTROSE 2-4 GM/100ML-% SOLUTION: Performed by: NEUROLOGICAL SURGERY

## 2019-08-21 PROCEDURE — 0SG00K1 FUSION OF LUMBAR VERTEBRAL JOINT WITH NONAUTOLOGOUS TISSUE SUBSTITUTE, POSTERIOR APPROACH, POSTERIOR COLUMN, OPEN APPROACH: ICD-10-PCS | Performed by: NEUROLOGICAL SURGERY

## 2019-08-21 PROCEDURE — 22853 INSJ BIOMECHANICAL DEVICE: CPT | Performed by: NEUROLOGICAL SURGERY

## 2019-08-21 PROCEDURE — 72100 X-RAY EXAM L-S SPINE 2/3 VWS: CPT | Performed by: NEUROLOGICAL SURGERY

## 2019-08-21 PROCEDURE — 22853 INSJ BIOMECHANICAL DEVICE: CPT | Performed by: SPECIALIST/TECHNOLOGIST, OTHER

## 2019-08-21 PROCEDURE — 25010000002 PROMETHAZINE PER 50 MG: Performed by: NURSE ANESTHETIST, CERTIFIED REGISTERED

## 2019-08-21 PROCEDURE — 0SB20ZZ EXCISION OF LUMBAR VERTEBRAL DISC, OPEN APPROACH: ICD-10-PCS | Performed by: NEUROLOGICAL SURGERY

## 2019-08-21 PROCEDURE — 76000 FLUOROSCOPY <1 HR PHYS/QHP: CPT | Performed by: NEUROLOGICAL SURGERY

## 2019-08-21 PROCEDURE — 25010000002 MIDAZOLAM PER 1 MG: Performed by: ANESTHESIOLOGY

## 2019-08-21 PROCEDURE — 63710000001 INSULIN ISOPHANE HUMAN PER 5 UNITS: Performed by: HOSPITALIST

## 2019-08-21 PROCEDURE — 25010000002 NEOSTIGMINE PER 0.5 MG: Performed by: NURSE ANESTHETIST, CERTIFIED REGISTERED

## 2019-08-21 PROCEDURE — 25010000002 KETOROLAC TROMETHAMINE PER 15 MG: Performed by: NURSE ANESTHETIST, CERTIFIED REGISTERED

## 2019-08-21 PROCEDURE — 22840 INSERT SPINE FIXATION DEVICE: CPT | Performed by: NEUROLOGICAL SURGERY

## 2019-08-21 PROCEDURE — 25010000002 PROPOFOL 10 MG/ML EMULSION: Performed by: NURSE ANESTHETIST, CERTIFIED REGISTERED

## 2019-08-21 DEVICE — IMPLANTABLE DEVICE: Type: IMPLANTABLE DEVICE | Site: SPINE LUMBAR | Status: FUNCTIONAL

## 2019-08-21 DEVICE — ALLOGRFT BONE VIVIGEN CELLUAR MATRX FORMABLE 5CC: Type: IMPLANTABLE DEVICE | Site: SPINE LUMBAR | Status: FUNCTIONAL

## 2019-08-21 DEVICE — SCRW INNR EXPEDIUM: Type: IMPLANTABLE DEVICE | Site: SPINE LUMBAR | Status: FUNCTIONAL

## 2019-08-21 DEVICE — ROD PREBNT SPINE EXPEDIUM TI 5.5X45MM: Type: IMPLANTABLE DEVICE | Site: SPINE LUMBAR | Status: FUNCTIONAL

## 2019-08-21 DEVICE — SCRW EXPEDIUM PA TI 6X50MM: Type: IMPLANTABLE DEVICE | Site: SPINE LUMBAR | Status: FUNCTIONAL

## 2019-08-21 DEVICE — SCRW EXPEDIUM PA TI 6X45MM: Type: IMPLANTABLE DEVICE | Site: SPINE LUMBAR | Status: FUNCTIONAL

## 2019-08-21 RX ORDER — GLYCOPYRROLATE 0.2 MG/ML
INJECTION INTRAMUSCULAR; INTRAVENOUS AS NEEDED
Status: DISCONTINUED | OUTPATIENT
Start: 2019-08-21 | End: 2019-08-21 | Stop reason: SURG

## 2019-08-21 RX ORDER — CEFAZOLIN SODIUM 2 G/100ML
2 INJECTION, SOLUTION INTRAVENOUS ONCE
Status: COMPLETED | OUTPATIENT
Start: 2019-08-21 | End: 2019-08-21

## 2019-08-21 RX ORDER — SENNA AND DOCUSATE SODIUM 50; 8.6 MG/1; MG/1
1 TABLET, FILM COATED ORAL NIGHTLY PRN
Status: DISCONTINUED | OUTPATIENT
Start: 2019-08-21 | End: 2019-08-24 | Stop reason: HOSPADM

## 2019-08-21 RX ORDER — KETOROLAC TROMETHAMINE 30 MG/ML
INJECTION, SOLUTION INTRAMUSCULAR; INTRAVENOUS AS NEEDED
Status: DISCONTINUED | OUTPATIENT
Start: 2019-08-21 | End: 2019-08-21 | Stop reason: SURG

## 2019-08-21 RX ORDER — MORPHINE SULFATE 2 MG/ML
2 INJECTION, SOLUTION INTRAMUSCULAR; INTRAVENOUS EVERY 4 HOURS PRN
Status: DISCONTINUED | OUTPATIENT
Start: 2019-08-21 | End: 2019-08-24 | Stop reason: HOSPADM

## 2019-08-21 RX ORDER — NALOXONE HCL 0.4 MG/ML
0.4 VIAL (ML) INJECTION
Status: DISCONTINUED | OUTPATIENT
Start: 2019-08-21 | End: 2019-08-24 | Stop reason: HOSPADM

## 2019-08-21 RX ORDER — BUPIVACAINE HYDROCHLORIDE AND EPINEPHRINE 5; 5 MG/ML; UG/ML
INJECTION, SOLUTION PERINEURAL AS NEEDED
Status: DISCONTINUED | OUTPATIENT
Start: 2019-08-21 | End: 2019-08-21 | Stop reason: HOSPADM

## 2019-08-21 RX ORDER — KETOROLAC TROMETHAMINE 30 MG/ML
15 INJECTION, SOLUTION INTRAMUSCULAR; INTRAVENOUS EVERY 6 HOURS PRN
Status: ACTIVE | OUTPATIENT
Start: 2019-08-21 | End: 2019-08-22

## 2019-08-21 RX ORDER — OXYCODONE AND ACETAMINOPHEN 7.5; 325 MG/1; MG/1
1 TABLET ORAL EVERY 4 HOURS PRN
Status: DISCONTINUED | OUTPATIENT
Start: 2019-08-21 | End: 2019-08-24 | Stop reason: HOSPADM

## 2019-08-21 RX ORDER — SODIUM CHLORIDE 0.9 % (FLUSH) 0.9 %
3-10 SYRINGE (ML) INJECTION AS NEEDED
Status: DISCONTINUED | OUTPATIENT
Start: 2019-08-21 | End: 2019-08-24 | Stop reason: HOSPADM

## 2019-08-21 RX ORDER — SODIUM CHLORIDE, SODIUM LACTATE, POTASSIUM CHLORIDE, CALCIUM CHLORIDE 600; 310; 30; 20 MG/100ML; MG/100ML; MG/100ML; MG/100ML
9 INJECTION, SOLUTION INTRAVENOUS CONTINUOUS
Status: DISCONTINUED | OUTPATIENT
Start: 2019-08-21 | End: 2019-08-22

## 2019-08-21 RX ORDER — ACETAMINOPHEN 325 MG/1
650 TABLET ORAL ONCE AS NEEDED
Status: DISCONTINUED | OUTPATIENT
Start: 2019-08-21 | End: 2019-08-21 | Stop reason: HOSPADM

## 2019-08-21 RX ORDER — CHOLECALCIFEROL (VITAMIN D3) 125 MCG
5 CAPSULE ORAL NIGHTLY
Status: DISCONTINUED | OUTPATIENT
Start: 2019-08-21 | End: 2019-08-24 | Stop reason: HOSPADM

## 2019-08-21 RX ORDER — MIDAZOLAM HYDROCHLORIDE 1 MG/ML
1 INJECTION INTRAMUSCULAR; INTRAVENOUS
Status: DISCONTINUED | OUTPATIENT
Start: 2019-08-21 | End: 2019-08-21 | Stop reason: HOSPADM

## 2019-08-21 RX ORDER — SODIUM CHLORIDE AND POTASSIUM CHLORIDE 150; 900 MG/100ML; MG/100ML
75 INJECTION, SOLUTION INTRAVENOUS CONTINUOUS
Status: DISCONTINUED | OUTPATIENT
Start: 2019-08-21 | End: 2019-08-22

## 2019-08-21 RX ORDER — CYCLOBENZAPRINE HCL 10 MG
10 TABLET ORAL 3 TIMES DAILY PRN
Status: DISCONTINUED | OUTPATIENT
Start: 2019-08-21 | End: 2019-08-21 | Stop reason: SDUPTHER

## 2019-08-21 RX ORDER — HYDROMORPHONE HCL IN 0.9% NACL 10 MG/50ML
PATIENT CONTROLLED ANALGESIA SYRINGE INTRAVENOUS CONTINUOUS
Status: DISCONTINUED | OUTPATIENT
Start: 2019-08-21 | End: 2019-08-22

## 2019-08-21 RX ORDER — HYDROMORPHONE HCL 110MG/55ML
PATIENT CONTROLLED ANALGESIA SYRINGE INTRAVENOUS AS NEEDED
Status: DISCONTINUED | OUTPATIENT
Start: 2019-08-21 | End: 2019-08-21 | Stop reason: SURG

## 2019-08-21 RX ORDER — PROPOFOL 10 MG/ML
VIAL (ML) INTRAVENOUS AS NEEDED
Status: DISCONTINUED | OUTPATIENT
Start: 2019-08-21 | End: 2019-08-21 | Stop reason: SURG

## 2019-08-21 RX ORDER — NICOTINE POLACRILEX 4 MG
15 LOZENGE BUCCAL
Status: DISCONTINUED | OUTPATIENT
Start: 2019-08-21 | End: 2019-08-24 | Stop reason: HOSPADM

## 2019-08-21 RX ORDER — SENNA AND DOCUSATE SODIUM 50; 8.6 MG/1; MG/1
2 TABLET, FILM COATED ORAL NIGHTLY
Status: DISCONTINUED | OUTPATIENT
Start: 2019-08-21 | End: 2019-08-24 | Stop reason: HOSPADM

## 2019-08-21 RX ORDER — NALOXONE HCL 0.4 MG/ML
0.2 VIAL (ML) INJECTION AS NEEDED
Status: DISCONTINUED | OUTPATIENT
Start: 2019-08-21 | End: 2019-08-21 | Stop reason: HOSPADM

## 2019-08-21 RX ORDER — ROCURONIUM BROMIDE 10 MG/ML
INJECTION, SOLUTION INTRAVENOUS AS NEEDED
Status: DISCONTINUED | OUTPATIENT
Start: 2019-08-21 | End: 2019-08-21 | Stop reason: SURG

## 2019-08-21 RX ORDER — FAMOTIDINE 10 MG/ML
20 INJECTION, SOLUTION INTRAVENOUS ONCE
Status: COMPLETED | OUTPATIENT
Start: 2019-08-21 | End: 2019-08-21

## 2019-08-21 RX ORDER — CYCLOBENZAPRINE HCL 10 MG
10 TABLET ORAL EVERY 8 HOURS PRN
Status: DISCONTINUED | OUTPATIENT
Start: 2019-08-21 | End: 2019-08-24 | Stop reason: HOSPADM

## 2019-08-21 RX ORDER — HYDROCODONE BITARTRATE AND ACETAMINOPHEN 7.5; 325 MG/1; MG/1
1 TABLET ORAL ONCE AS NEEDED
Status: DISCONTINUED | OUTPATIENT
Start: 2019-08-21 | End: 2019-08-21 | Stop reason: HOSPADM

## 2019-08-21 RX ORDER — ONDANSETRON 2 MG/ML
4 INJECTION INTRAMUSCULAR; INTRAVENOUS ONCE AS NEEDED
Status: COMPLETED | OUTPATIENT
Start: 2019-08-21 | End: 2019-08-21

## 2019-08-21 RX ORDER — SODIUM CHLORIDE 0.9 % (FLUSH) 0.9 %
3 SYRINGE (ML) INJECTION EVERY 12 HOURS SCHEDULED
Status: DISCONTINUED | OUTPATIENT
Start: 2019-08-21 | End: 2019-08-24 | Stop reason: HOSPADM

## 2019-08-21 RX ORDER — PROMETHAZINE HYDROCHLORIDE 25 MG/ML
12.5 INJECTION, SOLUTION INTRAMUSCULAR; INTRAVENOUS ONCE AS NEEDED
Status: DISCONTINUED | OUTPATIENT
Start: 2019-08-21 | End: 2019-08-21 | Stop reason: HOSPADM

## 2019-08-21 RX ORDER — DEXTROSE MONOHYDRATE 25 G/50ML
25 INJECTION, SOLUTION INTRAVENOUS
Status: DISCONTINUED | OUTPATIENT
Start: 2019-08-21 | End: 2019-08-24 | Stop reason: HOSPADM

## 2019-08-21 RX ORDER — EPHEDRINE SULFATE 50 MG/ML
5 INJECTION, SOLUTION INTRAVENOUS ONCE AS NEEDED
Status: DISCONTINUED | OUTPATIENT
Start: 2019-08-21 | End: 2019-08-21 | Stop reason: HOSPADM

## 2019-08-21 RX ORDER — ZOLPIDEM TARTRATE 5 MG/1
5 TABLET ORAL NIGHTLY PRN
Status: DISCONTINUED | OUTPATIENT
Start: 2019-08-21 | End: 2019-08-24 | Stop reason: HOSPADM

## 2019-08-21 RX ORDER — LABETALOL HYDROCHLORIDE 5 MG/ML
5 INJECTION, SOLUTION INTRAVENOUS
Status: DISCONTINUED | OUTPATIENT
Start: 2019-08-21 | End: 2019-08-21 | Stop reason: HOSPADM

## 2019-08-21 RX ORDER — PROMETHAZINE HYDROCHLORIDE 25 MG/1
25 TABLET ORAL ONCE AS NEEDED
Status: DISCONTINUED | OUTPATIENT
Start: 2019-08-21 | End: 2019-08-21 | Stop reason: HOSPADM

## 2019-08-21 RX ORDER — CARVEDILOL 3.12 MG/1
3.12 TABLET ORAL 2 TIMES DAILY
Status: DISCONTINUED | OUTPATIENT
Start: 2019-08-21 | End: 2019-08-24 | Stop reason: HOSPADM

## 2019-08-21 RX ORDER — OXYCODONE AND ACETAMINOPHEN 7.5; 325 MG/1; MG/1
1 TABLET ORAL ONCE AS NEEDED
Status: DISCONTINUED | OUTPATIENT
Start: 2019-08-21 | End: 2019-08-21 | Stop reason: HOSPADM

## 2019-08-21 RX ORDER — PROMETHAZINE HYDROCHLORIDE 25 MG/1
25 SUPPOSITORY RECTAL ONCE AS NEEDED
Status: DISCONTINUED | OUTPATIENT
Start: 2019-08-21 | End: 2019-08-21 | Stop reason: HOSPADM

## 2019-08-21 RX ORDER — LIDOCAINE HYDROCHLORIDE 10 MG/ML
0.5 INJECTION, SOLUTION EPIDURAL; INFILTRATION; INTRACAUDAL; PERINEURAL ONCE AS NEEDED
Status: DISCONTINUED | OUTPATIENT
Start: 2019-08-21 | End: 2019-08-21 | Stop reason: HOSPADM

## 2019-08-21 RX ORDER — OXYCODONE AND ACETAMINOPHEN 7.5; 325 MG/1; MG/1
2 TABLET ORAL EVERY 4 HOURS PRN
Status: DISCONTINUED | OUTPATIENT
Start: 2019-08-21 | End: 2019-08-24 | Stop reason: HOSPADM

## 2019-08-21 RX ORDER — ACETAMINOPHEN 325 MG/1
650 TABLET ORAL EVERY 4 HOURS PRN
Status: DISCONTINUED | OUTPATIENT
Start: 2019-08-21 | End: 2019-08-24 | Stop reason: HOSPADM

## 2019-08-21 RX ORDER — ACETAMINOPHEN 500 MG
1000 TABLET ORAL EVERY 6 HOURS PRN
Status: DISCONTINUED | OUTPATIENT
Start: 2019-08-21 | End: 2019-08-21 | Stop reason: SDUPTHER

## 2019-08-21 RX ORDER — PROMETHAZINE HYDROCHLORIDE 25 MG/ML
6.25 INJECTION, SOLUTION INTRAMUSCULAR; INTRAVENOUS
Status: DISCONTINUED | OUTPATIENT
Start: 2019-08-21 | End: 2019-08-21 | Stop reason: HOSPADM

## 2019-08-21 RX ORDER — ATORVASTATIN CALCIUM 20 MG/1
40 TABLET, FILM COATED ORAL NIGHTLY
Status: DISCONTINUED | OUTPATIENT
Start: 2019-08-21 | End: 2019-08-24 | Stop reason: HOSPADM

## 2019-08-21 RX ORDER — ONDANSETRON 2 MG/ML
4 INJECTION INTRAMUSCULAR; INTRAVENOUS EVERY 6 HOURS PRN
Status: DISCONTINUED | OUTPATIENT
Start: 2019-08-21 | End: 2019-08-24 | Stop reason: HOSPADM

## 2019-08-21 RX ORDER — PANTOPRAZOLE SODIUM 40 MG/1
40 TABLET, DELAYED RELEASE ORAL DAILY
Status: DISCONTINUED | OUTPATIENT
Start: 2019-08-22 | End: 2019-08-24 | Stop reason: HOSPADM

## 2019-08-21 RX ORDER — FENTANYL CITRATE 50 UG/ML
50 INJECTION, SOLUTION INTRAMUSCULAR; INTRAVENOUS
Status: DISCONTINUED | OUTPATIENT
Start: 2019-08-21 | End: 2019-08-21 | Stop reason: HOSPADM

## 2019-08-21 RX ORDER — DIPHENHYDRAMINE HCL 25 MG
25 CAPSULE ORAL
Status: DISCONTINUED | OUTPATIENT
Start: 2019-08-21 | End: 2019-08-21 | Stop reason: HOSPADM

## 2019-08-21 RX ORDER — LIDOCAINE HYDROCHLORIDE 20 MG/ML
INJECTION, SOLUTION INFILTRATION; PERINEURAL AS NEEDED
Status: DISCONTINUED | OUTPATIENT
Start: 2019-08-21 | End: 2019-08-21 | Stop reason: SURG

## 2019-08-21 RX ORDER — LISINOPRIL 40 MG/1
40 TABLET ORAL DAILY
Status: DISCONTINUED | OUTPATIENT
Start: 2019-08-21 | End: 2019-08-24 | Stop reason: HOSPADM

## 2019-08-21 RX ORDER — NALOXONE HCL 0.4 MG/ML
0.1 VIAL (ML) INJECTION
Status: DISCONTINUED | OUTPATIENT
Start: 2019-08-21 | End: 2019-08-22

## 2019-08-21 RX ORDER — MIDAZOLAM HYDROCHLORIDE 1 MG/ML
2 INJECTION INTRAMUSCULAR; INTRAVENOUS
Status: DISCONTINUED | OUTPATIENT
Start: 2019-08-21 | End: 2019-08-21 | Stop reason: HOSPADM

## 2019-08-21 RX ORDER — DIPHENHYDRAMINE HYDROCHLORIDE 50 MG/ML
12.5 INJECTION INTRAMUSCULAR; INTRAVENOUS
Status: DISCONTINUED | OUTPATIENT
Start: 2019-08-21 | End: 2019-08-21 | Stop reason: HOSPADM

## 2019-08-21 RX ORDER — ONDANSETRON 2 MG/ML
INJECTION INTRAMUSCULAR; INTRAVENOUS AS NEEDED
Status: DISCONTINUED | OUTPATIENT
Start: 2019-08-21 | End: 2019-08-21 | Stop reason: SURG

## 2019-08-21 RX ORDER — SODIUM CHLORIDE 0.9 % (FLUSH) 0.9 %
1-10 SYRINGE (ML) INJECTION AS NEEDED
Status: DISCONTINUED | OUTPATIENT
Start: 2019-08-21 | End: 2019-08-21 | Stop reason: HOSPADM

## 2019-08-21 RX ORDER — FLUMAZENIL 0.1 MG/ML
0.2 INJECTION INTRAVENOUS AS NEEDED
Status: DISCONTINUED | OUTPATIENT
Start: 2019-08-21 | End: 2019-08-21 | Stop reason: HOSPADM

## 2019-08-21 RX ORDER — HYDRALAZINE HYDROCHLORIDE 20 MG/ML
5 INJECTION INTRAMUSCULAR; INTRAVENOUS
Status: DISCONTINUED | OUTPATIENT
Start: 2019-08-21 | End: 2019-08-21 | Stop reason: HOSPADM

## 2019-08-21 RX ORDER — HYDROMORPHONE HYDROCHLORIDE 1 MG/ML
0.5 INJECTION, SOLUTION INTRAMUSCULAR; INTRAVENOUS; SUBCUTANEOUS
Status: DISCONTINUED | OUTPATIENT
Start: 2019-08-21 | End: 2019-08-21 | Stop reason: HOSPADM

## 2019-08-21 RX ADMIN — ONDANSETRON HYDROCHLORIDE 4 MG: 2 SOLUTION INTRAMUSCULAR; INTRAVENOUS at 11:44

## 2019-08-21 RX ADMIN — PROMETHAZINE HYDROCHLORIDE 6.25 MG: 25 INJECTION INTRAMUSCULAR; INTRAVENOUS at 13:27

## 2019-08-21 RX ADMIN — NEOSTIGMINE METHYLSULFATE 3 MG: 1 INJECTION INTRAMUSCULAR; INTRAVENOUS; SUBCUTANEOUS at 10:36

## 2019-08-21 RX ADMIN — ONDANSETRON 4 MG: 2 INJECTION INTRAMUSCULAR; INTRAVENOUS at 10:36

## 2019-08-21 RX ADMIN — ROCURONIUM BROMIDE 10 MG: 10 INJECTION INTRAVENOUS at 09:50

## 2019-08-21 RX ADMIN — OXYCODONE HYDROCHLORIDE AND ACETAMINOPHEN 1 TABLET: 7.5; 325 TABLET ORAL at 21:25

## 2019-08-21 RX ADMIN — SODIUM CHLORIDE, POTASSIUM CHLORIDE, SODIUM LACTATE AND CALCIUM CHLORIDE: 600; 310; 30; 20 INJECTION, SOLUTION INTRAVENOUS at 10:05

## 2019-08-21 RX ADMIN — PHENYLEPHRINE HYDROCHLORIDE 100 MCG: 10 INJECTION INTRAVENOUS at 09:36

## 2019-08-21 RX ADMIN — FAMOTIDINE 20 MG: 10 INJECTION INTRAVENOUS at 07:20

## 2019-08-21 RX ADMIN — INSULIN HUMAN 32 UNITS: 100 INJECTION, SUSPENSION SUBCUTANEOUS at 22:39

## 2019-08-21 RX ADMIN — SENNOSIDES AND DOCUSATE SODIUM 2 TABLET: 8.6; 5 TABLET ORAL at 21:25

## 2019-08-21 RX ADMIN — HYDROMORPHONE HYDROCHLORIDE: 10 INJECTION INTRAMUSCULAR; INTRAVENOUS; SUBCUTANEOUS at 11:51

## 2019-08-21 RX ADMIN — OXYCODONE HYDROCHLORIDE AND ACETAMINOPHEN 1 TABLET: 7.5; 325 TABLET ORAL at 15:27

## 2019-08-21 RX ADMIN — FENTANYL CITRATE 100 MCG: 50 INJECTION INTRAMUSCULAR; INTRAVENOUS at 07:47

## 2019-08-21 RX ADMIN — PHENYLEPHRINE HYDROCHLORIDE 100 MCG: 10 INJECTION INTRAVENOUS at 09:07

## 2019-08-21 RX ADMIN — POTASSIUM CHLORIDE AND SODIUM CHLORIDE 75 ML/HR: 900; 150 INJECTION, SOLUTION INTRAVENOUS at 17:13

## 2019-08-21 RX ADMIN — SODIUM CHLORIDE, POTASSIUM CHLORIDE, SODIUM LACTATE AND CALCIUM CHLORIDE 9 ML/HR: 600; 310; 30; 20 INJECTION, SOLUTION INTRAVENOUS at 07:20

## 2019-08-21 RX ADMIN — GLYCOPYRROLATE 0.3 MG: 0.2 INJECTION INTRAMUSCULAR; INTRAVENOUS at 10:36

## 2019-08-21 RX ADMIN — CARVEDILOL 3.12 MG: 3.12 TABLET, FILM COATED ORAL at 21:25

## 2019-08-21 RX ADMIN — ATORVASTATIN CALCIUM 40 MG: 20 TABLET, FILM COATED ORAL at 21:25

## 2019-08-21 RX ADMIN — LISINOPRIL 40 MG: 40 TABLET ORAL at 17:13

## 2019-08-21 RX ADMIN — CYCLOBENZAPRINE 10 MG: 10 TABLET, FILM COATED ORAL at 21:25

## 2019-08-21 RX ADMIN — KETOROLAC TROMETHAMINE 30 MG: 30 INJECTION, SOLUTION INTRAMUSCULAR; INTRAVENOUS at 10:34

## 2019-08-21 RX ADMIN — MIDAZOLAM 2 MG: 1 INJECTION INTRAMUSCULAR; INTRAVENOUS at 07:21

## 2019-08-21 RX ADMIN — LIDOCAINE HYDROCHLORIDE 100 MG: 20 INJECTION, SOLUTION INFILTRATION; PERINEURAL at 07:47

## 2019-08-21 RX ADMIN — CEFAZOLIN SODIUM 2 G: 2 INJECTION, SOLUTION INTRAVENOUS at 07:54

## 2019-08-21 RX ADMIN — HYDROMORPHONE HYDROCHLORIDE 0.5 MG: 2 INJECTION INTRAMUSCULAR; INTRAVENOUS; SUBCUTANEOUS at 10:33

## 2019-08-21 RX ADMIN — HYDROMORPHONE HYDROCHLORIDE 0.5 MG: 2 INJECTION INTRAMUSCULAR; INTRAVENOUS; SUBCUTANEOUS at 08:17

## 2019-08-21 RX ADMIN — INSULIN HUMAN 15 UNITS: 100 INJECTION, SOLUTION PARENTERAL at 17:16

## 2019-08-21 RX ADMIN — PROPOFOL 200 MG: 10 INJECTION, EMULSION INTRAVENOUS at 07:47

## 2019-08-21 RX ADMIN — ROCURONIUM BROMIDE 50 MG: 10 INJECTION INTRAVENOUS at 07:47

## 2019-08-21 RX ADMIN — INSULIN HUMAN 4 UNITS: 100 INJECTION, SOLUTION PARENTERAL at 17:15

## 2019-08-21 NOTE — ANESTHESIA POSTPROCEDURE EVALUATION
Patient: Kristine Rivas    Procedure Summary     Date:  08/21/19 Room / Location:  [facility] OR [address] [place] [address] MAIN OR    Anesthesia Start:  0744 Anesthesia Stop:  1108    Procedure:  Lumbar Laminectomy Lumbar 4 Lumbar 5 with posterior interbody allograft fusion using carbon fiber interbody device and posterior pedicle titanium screw fixation (Bilateral Spine Lumbar) Diagnosis:       Spinal stenosis, lumbar region, with neurogenic claudication      Spondylolisthesis of lumbar region      (Spinal stenosis, lumbar region, with neurogenic claudication [M48.062])      (Spondylolisthesis of lumbar region [M43.16])    Surgeon:  Harshad Madera MD Provider:  Clarence Macdonald MD    Anesthesia Type:  general ASA Status:  3          Anesthesia Type: general  Last vitals  BP   127/64 (08/21/19 1220)   Temp   36.5 °C (97.7 °F) (08/21/19 1106)   Pulse   78 (08/21/19 1220)   Resp   14 (08/21/19 1220)     SpO2   97 % (08/21/19 1220)     Post Anesthesia Care and Evaluation    Patient location during evaluation: PACU  Patient participation: complete - patient participated  Level of consciousness: awake and alert  Pain management: adequate  Airway patency: patent  Anesthetic complications: No anesthetic complications    Cardiovascular status: acceptable  Respiratory status: acceptable  Hydration status: acceptable    Comments: --------------------            08/21/19               1220     --------------------   BP:       127/64     Pulse:      78       Resp:       14       Temp:                SpO2:      97%      --------------------

## 2019-08-21 NOTE — ANESTHESIA PROCEDURE NOTES
Airway  Urgency: elective    Airway not difficult    General Information and Staff    Patient location during procedure: OR  CRNA: Zbigniew Mathews CRNA    Indications and Patient Condition  Indications for airway management: airway protection    Preoxygenated: yes  MILS maintained throughout  Mask difficulty assessment: 1 - vent by mask    Final Airway Details  Final airway type: endotracheal airway      Successful airway: ETT    Successful intubation technique: direct laryngoscopy  Blade: Kleber  Blade size: 3  ETT size (mm): 7.0  Cormack-Lehane Classification: grade IIa - partial view of glottis  Placement verified by: chest auscultation   Measured from: teeth  ETT to teeth (cm): 21  Number of attempts at approach: 1

## 2019-08-21 NOTE — ANESTHESIA PREPROCEDURE EVALUATION
Anesthesia Evaluation     Patient summary reviewed and Nursing notes reviewed   NPO Solid Status: > 8 hours  NPO Liquid Status: > 2 hours           Airway   Mallampati: II  TM distance: >3 FB  Neck ROM: full  No difficulty expected  Dental - normal exam   (+) poor dentition    Pulmonary - negative pulmonary ROS and normal exam    breath sounds clear to auscultation  Cardiovascular - normal exam  Exercise tolerance: good (4-7 METS)    ECG reviewed  Patient on routine beta blocker and Beta blocker given within 24 hours of surgery  Rhythm: regular  Rate: normal    (+) hypertension well controlled, past MI  >12 months, CAD, CABG >6 Months, hyperlipidemia,     ROS comment: >4mets  No issues since CABG  2106 stress test EF 60%    Neuro/Psych  (+) numbness,       ROS Comment: Left  Lower leg  GI/Hepatic/Renal/Endo    (+) obesity,  GERD well controlled,  diabetes mellitus type 2 using insulin,     Musculoskeletal     (+) neck pain,   Abdominal   (+) obese,    Substance History      OB/GYN negative ob/gyn ROS         Other   (+) arthritis                     Anesthesia Plan    ASA 3     general     intravenous induction   Anesthetic plan, all risks, benefits, and alternatives have been provided, discussed and informed consent has been obtained with: patient.    Plan discussed with CRNA.

## 2019-08-22 LAB
ANION GAP SERPL CALCULATED.3IONS-SCNC: 12 MMOL/L (ref 5–15)
BUN BLD-MCNC: 11 MG/DL (ref 6–20)
BUN/CREAT SERPL: 16.7 (ref 7–25)
CALCIUM SPEC-SCNC: 8 MG/DL (ref 8.6–10.5)
CHLORIDE SERPL-SCNC: 105 MMOL/L (ref 98–107)
CO2 SERPL-SCNC: 23 MMOL/L (ref 22–29)
CREAT BLD-MCNC: 0.66 MG/DL (ref 0.57–1)
DEPRECATED RDW RBC AUTO: 49.4 FL (ref 37–54)
ERYTHROCYTE [DISTWIDTH] IN BLOOD BY AUTOMATED COUNT: 14.8 % (ref 12.3–15.4)
GFR SERPL CREATININE-BSD FRML MDRD: 92 ML/MIN/1.73
GLUCOSE BLD-MCNC: 207 MG/DL (ref 65–99)
GLUCOSE BLDC GLUCOMTR-MCNC: 182 MG/DL (ref 70–130)
GLUCOSE BLDC GLUCOMTR-MCNC: 207 MG/DL (ref 70–130)
GLUCOSE BLDC GLUCOMTR-MCNC: 236 MG/DL (ref 70–130)
GLUCOSE BLDC GLUCOMTR-MCNC: 258 MG/DL (ref 70–130)
HBA1C MFR BLD: 8.57 % (ref 4.8–5.6)
HCT VFR BLD AUTO: 38.4 % (ref 34–46.6)
HGB BLD-MCNC: 11.4 G/DL (ref 12–15.9)
MCH RBC QN AUTO: 27.3 PG (ref 26.6–33)
MCHC RBC AUTO-ENTMCNC: 29.7 G/DL (ref 31.5–35.7)
MCV RBC AUTO: 91.9 FL (ref 79–97)
PLATELET # BLD AUTO: 206 10*3/MM3 (ref 140–450)
PMV BLD AUTO: 11.2 FL (ref 6–12)
POTASSIUM BLD-SCNC: 4.4 MMOL/L (ref 3.5–5.2)
RBC # BLD AUTO: 4.18 10*6/MM3 (ref 3.77–5.28)
SODIUM BLD-SCNC: 140 MMOL/L (ref 136–145)
WBC NRBC COR # BLD: 8.32 10*3/MM3 (ref 3.4–10.8)

## 2019-08-22 PROCEDURE — 63710000001 INSULIN REGULAR HUMAN PER 5 UNITS: Performed by: NEUROLOGICAL SURGERY

## 2019-08-22 PROCEDURE — 80048 BASIC METABOLIC PNL TOTAL CA: CPT | Performed by: HOSPITALIST

## 2019-08-22 PROCEDURE — 25810000003 SODIUM CHLORIDE 0.9 % WITH KCL 20 MEQ 20-0.9 MEQ/L-% SOLUTION: Performed by: NEUROLOGICAL SURGERY

## 2019-08-22 PROCEDURE — 97110 THERAPEUTIC EXERCISES: CPT

## 2019-08-22 PROCEDURE — 85027 COMPLETE CBC AUTOMATED: CPT | Performed by: HOSPITALIST

## 2019-08-22 PROCEDURE — 63710000001 INSULIN REGULAR HUMAN PER 5 UNITS: Performed by: HOSPITALIST

## 2019-08-22 PROCEDURE — 82962 GLUCOSE BLOOD TEST: CPT

## 2019-08-22 PROCEDURE — 83036 HEMOGLOBIN GLYCOSYLATED A1C: CPT | Performed by: HOSPITALIST

## 2019-08-22 PROCEDURE — 99024 POSTOP FOLLOW-UP VISIT: CPT | Performed by: PHYSICIAN ASSISTANT

## 2019-08-22 PROCEDURE — 97162 PT EVAL MOD COMPLEX 30 MIN: CPT

## 2019-08-22 PROCEDURE — 63710000001 INSULIN ISOPHANE HUMAN PER 5 UNITS: Performed by: HOSPITALIST

## 2019-08-22 RX ADMIN — LISINOPRIL 40 MG: 40 TABLET ORAL at 08:09

## 2019-08-22 RX ADMIN — OXYCODONE HYDROCHLORIDE AND ACETAMINOPHEN 1 TABLET: 7.5; 325 TABLET ORAL at 17:30

## 2019-08-22 RX ADMIN — CARVEDILOL 3.12 MG: 3.12 TABLET, FILM COATED ORAL at 08:09

## 2019-08-22 RX ADMIN — OXYCODONE HYDROCHLORIDE AND ACETAMINOPHEN 1 TABLET: 7.5; 325 TABLET ORAL at 12:49

## 2019-08-22 RX ADMIN — SODIUM CHLORIDE, PRESERVATIVE FREE 3 ML: 5 INJECTION INTRAVENOUS at 21:30

## 2019-08-22 RX ADMIN — INSULIN HUMAN 2 UNITS: 100 INJECTION, SOLUTION PARENTERAL at 18:05

## 2019-08-22 RX ADMIN — INSULIN HUMAN 15 UNITS: 100 INJECTION, SOLUTION PARENTERAL at 18:05

## 2019-08-22 RX ADMIN — INSULIN HUMAN 4 UNITS: 100 INJECTION, SOLUTION PARENTERAL at 21:28

## 2019-08-22 RX ADMIN — OXYCODONE HYDROCHLORIDE AND ACETAMINOPHEN 1 TABLET: 7.5; 325 TABLET ORAL at 21:29

## 2019-08-22 RX ADMIN — SENNOSIDES AND DOCUSATE SODIUM 2 TABLET: 8.6; 5 TABLET ORAL at 21:30

## 2019-08-22 RX ADMIN — INSULIN HUMAN 32 UNITS: 100 INJECTION, SUSPENSION SUBCUTANEOUS at 22:55

## 2019-08-22 RX ADMIN — OXYCODONE HYDROCHLORIDE AND ACETAMINOPHEN 1 TABLET: 7.5; 325 TABLET ORAL at 08:48

## 2019-08-22 RX ADMIN — ATORVASTATIN CALCIUM 40 MG: 20 TABLET, FILM COATED ORAL at 21:29

## 2019-08-22 RX ADMIN — PANTOPRAZOLE SODIUM 40 MG: 40 TABLET, DELAYED RELEASE ORAL at 08:09

## 2019-08-22 RX ADMIN — POTASSIUM CHLORIDE AND SODIUM CHLORIDE 75 ML/HR: 900; 150 INJECTION, SOLUTION INTRAVENOUS at 03:41

## 2019-08-22 RX ADMIN — CARVEDILOL 3.12 MG: 3.12 TABLET, FILM COATED ORAL at 21:29

## 2019-08-22 RX ADMIN — SODIUM CHLORIDE, PRESERVATIVE FREE 3 ML: 5 INJECTION INTRAVENOUS at 08:09

## 2019-08-22 RX ADMIN — INSULIN HUMAN 15 UNITS: 100 INJECTION, SOLUTION PARENTERAL at 08:07

## 2019-08-22 RX ADMIN — INSULIN HUMAN 6 UNITS: 100 INJECTION, SOLUTION PARENTERAL at 12:26

## 2019-08-22 RX ADMIN — OXYCODONE HYDROCHLORIDE AND ACETAMINOPHEN 1 TABLET: 7.5; 325 TABLET ORAL at 03:36

## 2019-08-22 RX ADMIN — Medication 5 MG: at 21:29

## 2019-08-22 RX ADMIN — INSULIN HUMAN 4 UNITS: 100 INJECTION, SOLUTION PARENTERAL at 08:08

## 2019-08-22 RX ADMIN — INSULIN HUMAN 15 UNITS: 100 INJECTION, SOLUTION PARENTERAL at 12:25

## 2019-08-23 LAB
BASOPHILS # BLD AUTO: 0.02 10*3/MM3 (ref 0–0.2)
BASOPHILS NFR BLD AUTO: 0.3 % (ref 0–1.5)
DEPRECATED RDW RBC AUTO: 47.2 FL (ref 37–54)
EOSINOPHIL # BLD AUTO: 0.46 10*3/MM3 (ref 0–0.4)
EOSINOPHIL NFR BLD AUTO: 5.9 % (ref 0.3–6.2)
ERYTHROCYTE [DISTWIDTH] IN BLOOD BY AUTOMATED COUNT: 14.6 % (ref 12.3–15.4)
GLUCOSE BLDC GLUCOMTR-MCNC: 153 MG/DL (ref 70–130)
GLUCOSE BLDC GLUCOMTR-MCNC: 165 MG/DL (ref 70–130)
GLUCOSE BLDC GLUCOMTR-MCNC: 172 MG/DL (ref 70–130)
GLUCOSE BLDC GLUCOMTR-MCNC: 203 MG/DL (ref 70–130)
HCT VFR BLD AUTO: 34.6 % (ref 34–46.6)
HGB BLD-MCNC: 10.8 G/DL (ref 12–15.9)
IMM GRANULOCYTES # BLD AUTO: 0.02 10*3/MM3 (ref 0–0.05)
IMM GRANULOCYTES NFR BLD AUTO: 0.3 % (ref 0–0.5)
LYMPHOCYTES # BLD AUTO: 2.16 10*3/MM3 (ref 0.7–3.1)
LYMPHOCYTES NFR BLD AUTO: 27.9 % (ref 19.6–45.3)
MCH RBC QN AUTO: 27.2 PG (ref 26.6–33)
MCHC RBC AUTO-ENTMCNC: 31.2 G/DL (ref 31.5–35.7)
MCV RBC AUTO: 87.2 FL (ref 79–97)
MONOCYTES # BLD AUTO: 0.64 10*3/MM3 (ref 0.1–0.9)
MONOCYTES NFR BLD AUTO: 8.3 % (ref 5–12)
NEUTROPHILS # BLD AUTO: 4.44 10*3/MM3 (ref 1.7–7)
NEUTROPHILS NFR BLD AUTO: 57.3 % (ref 42.7–76)
NRBC BLD AUTO-RTO: 0 /100 WBC (ref 0–0.2)
PLATELET # BLD AUTO: 197 10*3/MM3 (ref 140–450)
PMV BLD AUTO: 10.9 FL (ref 6–12)
RBC # BLD AUTO: 3.97 10*6/MM3 (ref 3.77–5.28)
WBC NRBC COR # BLD: 7.74 10*3/MM3 (ref 3.4–10.8)

## 2019-08-23 PROCEDURE — 63710000001 INSULIN REGULAR HUMAN PER 5 UNITS: Performed by: NEUROLOGICAL SURGERY

## 2019-08-23 PROCEDURE — 85025 COMPLETE CBC W/AUTO DIFF WBC: CPT | Performed by: PHYSICIAN ASSISTANT

## 2019-08-23 PROCEDURE — 97110 THERAPEUTIC EXERCISES: CPT

## 2019-08-23 PROCEDURE — 99024 POSTOP FOLLOW-UP VISIT: CPT | Performed by: NEUROLOGICAL SURGERY

## 2019-08-23 PROCEDURE — 63710000001 INSULIN REGULAR HUMAN PER 5 UNITS: Performed by: HOSPITALIST

## 2019-08-23 PROCEDURE — 63710000001 INSULIN ISOPHANE HUMAN PER 5 UNITS: Performed by: HOSPITALIST

## 2019-08-23 PROCEDURE — 82962 GLUCOSE BLOOD TEST: CPT

## 2019-08-23 RX ADMIN — PANTOPRAZOLE SODIUM 40 MG: 40 TABLET, DELAYED RELEASE ORAL at 08:02

## 2019-08-23 RX ADMIN — OXYCODONE HYDROCHLORIDE AND ACETAMINOPHEN 1 TABLET: 7.5; 325 TABLET ORAL at 16:51

## 2019-08-23 RX ADMIN — SODIUM CHLORIDE, PRESERVATIVE FREE 3 ML: 5 INJECTION INTRAVENOUS at 08:04

## 2019-08-23 RX ADMIN — INSULIN HUMAN 15 UNITS: 100 INJECTION, SOLUTION PARENTERAL at 18:00

## 2019-08-23 RX ADMIN — INSULIN HUMAN 2 UNITS: 100 INJECTION, SOLUTION PARENTERAL at 22:23

## 2019-08-23 RX ADMIN — OXYCODONE HYDROCHLORIDE AND ACETAMINOPHEN 1 TABLET: 7.5; 325 TABLET ORAL at 07:25

## 2019-08-23 RX ADMIN — INSULIN HUMAN 15 UNITS: 100 INJECTION, SOLUTION PARENTERAL at 12:15

## 2019-08-23 RX ADMIN — CYCLOBENZAPRINE 10 MG: 10 TABLET, FILM COATED ORAL at 07:31

## 2019-08-23 RX ADMIN — CYCLOBENZAPRINE 10 MG: 10 TABLET, FILM COATED ORAL at 22:24

## 2019-08-23 RX ADMIN — OXYCODONE HYDROCHLORIDE AND ACETAMINOPHEN 1 TABLET: 7.5; 325 TABLET ORAL at 02:54

## 2019-08-23 RX ADMIN — SODIUM CHLORIDE, PRESERVATIVE FREE 3 ML: 5 INJECTION INTRAVENOUS at 20:48

## 2019-08-23 RX ADMIN — Medication 5 MG: at 20:48

## 2019-08-23 RX ADMIN — INSULIN HUMAN 32 UNITS: 100 INJECTION, SUSPENSION SUBCUTANEOUS at 20:49

## 2019-08-23 RX ADMIN — INSULIN HUMAN 2 UNITS: 100 INJECTION, SOLUTION PARENTERAL at 09:58

## 2019-08-23 RX ADMIN — CARVEDILOL 3.12 MG: 3.12 TABLET, FILM COATED ORAL at 20:48

## 2019-08-23 RX ADMIN — OXYCODONE HYDROCHLORIDE AND ACETAMINOPHEN 2 TABLET: 7.5; 325 TABLET ORAL at 20:48

## 2019-08-23 RX ADMIN — INSULIN HUMAN 4 UNITS: 100 INJECTION, SOLUTION PARENTERAL at 12:15

## 2019-08-23 RX ADMIN — CYCLOBENZAPRINE 10 MG: 10 TABLET, FILM COATED ORAL at 15:32

## 2019-08-23 RX ADMIN — INSULIN HUMAN 2 UNITS: 100 INJECTION, SOLUTION PARENTERAL at 18:00

## 2019-08-23 RX ADMIN — OXYCODONE HYDROCHLORIDE AND ACETAMINOPHEN 1 TABLET: 7.5; 325 TABLET ORAL at 12:14

## 2019-08-23 RX ADMIN — LISINOPRIL 40 MG: 40 TABLET ORAL at 08:02

## 2019-08-23 RX ADMIN — INSULIN HUMAN 15 UNITS: 100 INJECTION, SOLUTION PARENTERAL at 09:57

## 2019-08-23 RX ADMIN — SENNOSIDES AND DOCUSATE SODIUM 2 TABLET: 8.6; 5 TABLET ORAL at 20:48

## 2019-08-23 RX ADMIN — ATORVASTATIN CALCIUM 40 MG: 20 TABLET, FILM COATED ORAL at 20:48

## 2019-08-23 RX ADMIN — CARVEDILOL 3.12 MG: 3.12 TABLET, FILM COATED ORAL at 08:02

## 2019-08-24 VITALS
RESPIRATION RATE: 16 BRPM | HEIGHT: 68 IN | HEART RATE: 75 BPM | OXYGEN SATURATION: 94 % | SYSTOLIC BLOOD PRESSURE: 116 MMHG | BODY MASS INDEX: 33.57 KG/M2 | TEMPERATURE: 98.1 F | DIASTOLIC BLOOD PRESSURE: 65 MMHG | WEIGHT: 221.5 LBS

## 2019-08-24 LAB
GLUCOSE BLDC GLUCOMTR-MCNC: 190 MG/DL (ref 70–130)
GLUCOSE BLDC GLUCOMTR-MCNC: 98 MG/DL (ref 70–130)

## 2019-08-24 PROCEDURE — 63710000001 INSULIN REGULAR HUMAN PER 5 UNITS: Performed by: HOSPITALIST

## 2019-08-24 PROCEDURE — 63710000001 INSULIN REGULAR HUMAN PER 5 UNITS: Performed by: NEUROLOGICAL SURGERY

## 2019-08-24 PROCEDURE — 97110 THERAPEUTIC EXERCISES: CPT

## 2019-08-24 PROCEDURE — 82962 GLUCOSE BLOOD TEST: CPT

## 2019-08-24 RX ORDER — CYCLOBENZAPRINE HCL 10 MG
10 TABLET ORAL EVERY 8 HOURS PRN
Qty: 30 TABLET | Refills: 0 | Status: SHIPPED | OUTPATIENT
Start: 2019-08-24 | End: 2019-10-23

## 2019-08-24 RX ORDER — OXYCODONE AND ACETAMINOPHEN 7.5; 325 MG/1; MG/1
1 TABLET ORAL EVERY 4 HOURS PRN
Qty: 40 TABLET | Refills: 0 | Status: SHIPPED | OUTPATIENT
Start: 2019-08-24 | End: 2019-08-31

## 2019-08-24 RX ORDER — SENNA AND DOCUSATE SODIUM 50; 8.6 MG/1; MG/1
1 TABLET, FILM COATED ORAL NIGHTLY PRN
Qty: 20 TABLET | Refills: 0 | Status: SHIPPED | OUTPATIENT
Start: 2019-08-24 | End: 2020-07-01

## 2019-08-24 RX ADMIN — PANTOPRAZOLE SODIUM 40 MG: 40 TABLET, DELAYED RELEASE ORAL at 08:13

## 2019-08-24 RX ADMIN — SODIUM CHLORIDE, PRESERVATIVE FREE 3 ML: 5 INJECTION INTRAVENOUS at 08:13

## 2019-08-24 RX ADMIN — LISINOPRIL 40 MG: 40 TABLET ORAL at 08:14

## 2019-08-24 RX ADMIN — CARVEDILOL 3.12 MG: 3.12 TABLET, FILM COATED ORAL at 08:14

## 2019-08-24 RX ADMIN — OXYCODONE HYDROCHLORIDE AND ACETAMINOPHEN 1 TABLET: 7.5; 325 TABLET ORAL at 03:38

## 2019-08-24 RX ADMIN — INSULIN HUMAN 15 UNITS: 100 INJECTION, SOLUTION PARENTERAL at 12:08

## 2019-08-24 RX ADMIN — INSULIN HUMAN 15 UNITS: 100 INJECTION, SOLUTION PARENTERAL at 08:12

## 2019-08-24 RX ADMIN — CYCLOBENZAPRINE 10 MG: 10 TABLET, FILM COATED ORAL at 09:55

## 2019-08-24 RX ADMIN — OXYCODONE HYDROCHLORIDE AND ACETAMINOPHEN 1 TABLET: 7.5; 325 TABLET ORAL at 12:07

## 2019-08-24 RX ADMIN — INSULIN HUMAN 2 UNITS: 100 INJECTION, SOLUTION PARENTERAL at 12:08

## 2019-08-24 RX ADMIN — OXYCODONE HYDROCHLORIDE AND ACETAMINOPHEN 1 TABLET: 7.5; 325 TABLET ORAL at 08:12

## 2019-08-25 ENCOUNTER — READMISSION MANAGEMENT (OUTPATIENT)
Dept: CALL CENTER | Facility: HOSPITAL | Age: 59
End: 2019-08-25

## 2019-08-25 NOTE — OUTREACH NOTE
Prep Survey      Responses   Facility patient discharged from?  [place]   Is patient eligible?  Yes   Discharge diagnosis  Spinal stenosis, lumbar region, with neurogenic claudication, obesity, spondylolisthesis of lumbar region, DM II, HLD, Essential HTN, chronic CAD, S/P lumbar laminectomy L4-5 with Post. interbody allograft fusion   Does the patient have one of the following disease processes/diagnoses(primary or secondary)?  General Surgery   Does the patient have Home health ordered?  No   Is there a DME ordered?  No   Comments regarding appointments  Pt to schedule follow up with PCP   Prep survey completed?  Yes          Jessica Peralta RN

## 2019-08-26 ENCOUNTER — READMISSION MANAGEMENT (OUTPATIENT)
Dept: CALL CENTER | Facility: HOSPITAL | Age: 59
End: 2019-08-26

## 2019-08-26 NOTE — OUTREACH NOTE
General Surgery Week 1 Survey      Responses   Facility patient discharged from?  [place]   Does the patient have one of the following disease processes/diagnoses(primary or secondary)?  General Surgery   Is there a successful TCM telephone encounter documented?  No   Week 1 attempt successful?  No   Unsuccessful attempts  Attempt 1          Lilly Motta RN

## 2019-08-27 ENCOUNTER — TELEPHONE (OUTPATIENT)
Dept: NEUROSURGERY | Facility: CLINIC | Age: 59
End: 2019-08-27

## 2019-08-27 ENCOUNTER — READMISSION MANAGEMENT (OUTPATIENT)
Dept: CALL CENTER | Facility: HOSPITAL | Age: 59
End: 2019-08-27

## 2019-08-27 NOTE — TELEPHONE ENCOUNTER
How are you feeling? OK    Are you having any pain? Where? Incisional soreness. Bruised feeling in the hip and buttock area    Rate pain from 1-10 - 8 1/2 when moving    Are you taking the pain RX? Yes, as directed    Do you think it's helping? yes    Do you feel better than before surgery? yes    Dermabond OK? yes    Is you incision red, swollen or bleeding? Bruised, no fever    Are you having any trouble with nausea or constipation? Constipation, advised to use OTC meds    Were your discharge instructions easy to understand? yes    Any other questions?    Confirm post op appt - yes

## 2019-08-27 NOTE — OUTREACH NOTE
General Surgery Week 1 Survey      Responses   Facility patient discharged from?  [place]   Does the patient have one of the following disease processes/diagnoses(primary or secondary)?  General Surgery   Is there a successful TCM telephone encounter documented?  No   Week 1 attempt successful?  Yes   Call start time  1106   Call end time  1111   Discharge diagnosis  Spinal stenosis, lumbar region, with neurogenic claudication, obesity, spondylolisthesis of lumbar region, DM II, HLD, Essential HTN, chronic CAD, S/P lumbar laminectomy L4-5 with Post. interbody allograft fusion   Meds reviewed with patient/caregiver?  Yes   Is the patient having any side effects they believe may be caused by any medication additions or changes?  No   Does the patient have all medications related to this admission filled (includes all antibiotics, pain medications, etc.)  Yes   Is the patient taking all medications as directed (includes completed medication regime)?  Yes   Does the patient have a follow up appointment scheduled with their surgeon?  Yes [Followup on Sept 5 2019]   Has the patient kept scheduled appointments due by today?  N/A   Has home health visited the patient within 72 hours of discharge?  N/A   Psychosocial issues?  No   Comments  Patient reports she is doing well. Mild pain. No s/s of infection.    Did the patient receive a copy of their discharge instructions?  Yes   Nursing interventions  Reviewed instructions with patient   What is the patient's perception of their health status since discharge?  Improving   Nursing interventions  Nurse provided patient education   Is the patient /caregiver able to teach back basic post-op care?  Continue use of incentive spirometry at least 1 week post discharge, Drive as instructed by MD in discharge instructions, Take showers only when approved by MD-sponge bathe until then, No tub bath, swimming, or hot tub until instructed by MD, Keep incision areas clean,dry and  protected, Do not remove steri-strips, Lifting as instructed by MD in discharge instructions   Is the patient/caregiver able to teach back signs and symptoms of incisional infection?  Increased redness, swelling or pain at the incisonal site, Increased drainage or bleeding, Incisional warmth, Pus or odor from incision, Fever   Is the patient/caregiver able to teach back steps to recovery at home?  Set small, achievable goals for return to baseline health, Rest and rebuild strength, gradually increase activity, Make a list of questions for surgeon's appointment   Is the patient/caregiver able to teach back the hierarchy of who to call/visit for symptoms/problems? PCP, Specialist, Home health nurse, Urgent Care, ED, 911  Yes   Week 1 call completed?  Yes          Delvis Flower RN

## 2019-09-04 ENCOUNTER — OFFICE VISIT (OUTPATIENT)
Dept: NEUROSURGERY | Facility: CLINIC | Age: 59
End: 2019-09-04

## 2019-09-04 ENCOUNTER — READMISSION MANAGEMENT (OUTPATIENT)
Dept: CALL CENTER | Facility: HOSPITAL | Age: 59
End: 2019-09-04

## 2019-09-04 DIAGNOSIS — M43.16 SPONDYLOLISTHESIS OF LUMBAR REGION: ICD-10-CM

## 2019-09-04 DIAGNOSIS — M48.062 SPINAL STENOSIS, LUMBAR REGION, WITH NEUROGENIC CLAUDICATION: Primary | ICD-10-CM

## 2019-09-04 PROCEDURE — 99024 POSTOP FOLLOW-UP VISIT: CPT | Performed by: NURSE PRACTITIONER

## 2019-09-04 RX ORDER — HYDROCODONE BITARTRATE AND ACETAMINOPHEN 7.5; 325 MG/1; MG/1
2 TABLET ORAL EVERY 6 HOURS PRN
Qty: 40 TABLET | Refills: 0 | Status: SHIPPED | OUTPATIENT
Start: 2019-09-04 | End: 2020-07-01

## 2019-09-04 NOTE — OUTREACH NOTE
General Surgery Week 2 Survey      Responses   Facility patient discharged from?  [place]   Does the patient have one of the following disease processes/diagnoses(primary or secondary)?  General Surgery   Week 2 attempt successful?  No   Unsuccessful attempts  Attempt 1          Darlene Sharif RN

## 2019-09-05 ENCOUNTER — READMISSION MANAGEMENT (OUTPATIENT)
Dept: CALL CENTER | Facility: HOSPITAL | Age: 59
End: 2019-09-05

## 2019-09-05 NOTE — OUTREACH NOTE
General Surgery Week 2 Survey      Responses   Facility patient discharged from?  [place]   Does the patient have one of the following disease processes/diagnoses(primary or secondary)?  General Surgery   Week 2 attempt successful?  Yes   Call start time  1518   Call end time  1524   Discharge diagnosis  Spinal stenosis, lumbar region, with neurogenic claudication, obesity, spondylolisthesis of lumbar region, DM II, HLD, Essential HTN, chronic CAD, S/P lumbar laminectomy L4-5 with Post. interbody allograft fusion   Is patient permission given to speak with other caregiver?  No   Meds reviewed with patient/caregiver?  Yes   Is the patient taking all medications as directed (includes completed medication regime)?  Yes   Has the patient kept scheduled appointments due by today?  Yes   Has home health visited the patient within 72 hours of discharge?  N/A   What is the patient's perception of their health status since discharge?  Improving   Week 2 call completed?  Yes   Revoked  No further contact(revokes)-requires comment   Graduated/Revoked comments  Goals met   Wrap up additional comments  Incision healing well. Steri strips, staples all out.  Pain rated 3. Afebrile.  Has seen her surgeon is please with her progress.           Starr Castro, RN

## 2019-09-10 ENCOUNTER — HOSPITAL ENCOUNTER (EMERGENCY)
Facility: HOSPITAL | Age: 59
Discharge: HOME | End: 2019-09-10
Attending: EMERGENCY MEDICINE | Admitting: EMERGENCY MEDICINE

## 2019-09-10 VITALS
HEIGHT: 68 IN | OXYGEN SATURATION: 97 % | SYSTOLIC BLOOD PRESSURE: 182 MMHG | HEART RATE: 65 BPM | RESPIRATION RATE: 18 BRPM | TEMPERATURE: 98.5 F | DIASTOLIC BLOOD PRESSURE: 89 MMHG | WEIGHT: 210 LBS | BODY MASS INDEX: 31.83 KG/M2

## 2019-09-10 DIAGNOSIS — R42 VESTIBULAR DIZZINESS INVOLVING LEFT INNER EAR: Primary | ICD-10-CM

## 2019-09-10 LAB
ALBUMIN SERPL-MCNC: 4.2 G/DL (ref 3.5–5.2)
ALBUMIN/GLOB SERPL: 1.2 G/DL
ALP SERPL-CCNC: 156 U/L (ref 39–117)
ALT SERPL W P-5'-P-CCNC: 17 U/L (ref 1–33)
ANION GAP SERPL CALCULATED.3IONS-SCNC: 18.2 MMOL/L (ref 5–15)
AST SERPL-CCNC: 16 U/L (ref 1–32)
BASOPHILS # BLD AUTO: 0.07 10*3/MM3 (ref 0–0.2)
BASOPHILS NFR BLD AUTO: 0.6 % (ref 0–1.5)
BILIRUB SERPL-MCNC: 0.4 MG/DL (ref 0.2–1.2)
BUN BLD-MCNC: 10 MG/DL (ref 6–20)
BUN/CREAT SERPL: 11.9 (ref 7–25)
CALCIUM SPEC-SCNC: 9.4 MG/DL (ref 8.6–10.5)
CHLORIDE SERPL-SCNC: 98 MMOL/L (ref 98–107)
CO2 SERPL-SCNC: 21.8 MMOL/L (ref 22–29)
CREAT BLD-MCNC: 0.84 MG/DL (ref 0.57–1)
DEPRECATED RDW RBC AUTO: 42.5 FL (ref 37–54)
EOSINOPHIL # BLD AUTO: 0.99 10*3/MM3 (ref 0–0.4)
EOSINOPHIL NFR BLD AUTO: 8.9 % (ref 0.3–6.2)
ERYTHROCYTE [DISTWIDTH] IN BLOOD BY AUTOMATED COUNT: 13.9 % (ref 12.3–15.4)
GFR SERPL CREATININE-BSD FRML MDRD: 69 ML/MIN/1.73
GLOBULIN UR ELPH-MCNC: 3.4 GM/DL
GLUCOSE BLD-MCNC: 231 MG/DL (ref 65–99)
HCT VFR BLD AUTO: 39.2 % (ref 34–46.6)
HGB BLD-MCNC: 12.3 G/DL (ref 12–15.9)
IMM GRANULOCYTES # BLD AUTO: 0.06 10*3/MM3 (ref 0–0.05)
IMM GRANULOCYTES NFR BLD AUTO: 0.5 % (ref 0–0.5)
LYMPHOCYTES # BLD AUTO: 4.39 10*3/MM3 (ref 0.7–3.1)
LYMPHOCYTES NFR BLD AUTO: 39.5 % (ref 19.6–45.3)
MCH RBC QN AUTO: 26.2 PG (ref 26.6–33)
MCHC RBC AUTO-ENTMCNC: 31.4 G/DL (ref 31.5–35.7)
MCV RBC AUTO: 83.6 FL (ref 79–97)
MONOCYTES # BLD AUTO: 0.8 10*3/MM3 (ref 0.1–0.9)
MONOCYTES NFR BLD AUTO: 7.2 % (ref 5–12)
NEUTROPHILS # BLD AUTO: 4.8 10*3/MM3 (ref 1.7–7)
NEUTROPHILS NFR BLD AUTO: 43.3 % (ref 42.7–76)
NRBC BLD AUTO-RTO: 0 /100 WBC (ref 0–0.2)
PLATELET # BLD AUTO: 516 10*3/MM3 (ref 140–450)
PMV BLD AUTO: 10.8 FL (ref 6–12)
POTASSIUM BLD-SCNC: 4.1 MMOL/L (ref 3.5–5.2)
PROT SERPL-MCNC: 7.6 G/DL (ref 6–8.5)
RBC # BLD AUTO: 4.69 10*6/MM3 (ref 3.77–5.28)
SODIUM BLD-SCNC: 138 MMOL/L (ref 136–145)
WBC NRBC COR # BLD: 11.11 10*3/MM3 (ref 3.4–10.8)

## 2019-09-10 PROCEDURE — 25010000002 PROMETHAZINE PER 50 MG: Performed by: EMERGENCY MEDICINE

## 2019-09-10 PROCEDURE — 96374 THER/PROPH/DIAG INJ IV PUSH: CPT

## 2019-09-10 PROCEDURE — 99284 EMERGENCY DEPT VISIT MOD MDM: CPT

## 2019-09-10 PROCEDURE — 80053 COMPREHEN METABOLIC PANEL: CPT | Performed by: EMERGENCY MEDICINE

## 2019-09-10 PROCEDURE — 25010000002 LORAZEPAM PER 2 MG: Performed by: EMERGENCY MEDICINE

## 2019-09-10 PROCEDURE — 85025 COMPLETE CBC W/AUTO DIFF WBC: CPT | Performed by: EMERGENCY MEDICINE

## 2019-09-10 PROCEDURE — 99282 EMERGENCY DEPT VISIT SF MDM: CPT | Performed by: EMERGENCY MEDICINE

## 2019-09-10 PROCEDURE — 96375 TX/PRO/DX INJ NEW DRUG ADDON: CPT

## 2019-09-10 PROCEDURE — 25010000002 ONDANSETRON PER 1 MG: Performed by: EMERGENCY MEDICINE

## 2019-09-10 RX ORDER — PROMETHAZINE HYDROCHLORIDE 25 MG/ML
25 INJECTION, SOLUTION INTRAMUSCULAR; INTRAVENOUS ONCE
Status: COMPLETED | OUTPATIENT
Start: 2019-09-10 | End: 2019-09-10

## 2019-09-10 RX ORDER — SODIUM CHLORIDE 0.9 % (FLUSH) 0.9 %
10 SYRINGE (ML) INJECTION AS NEEDED
Status: DISCONTINUED | OUTPATIENT
Start: 2019-09-10 | End: 2019-09-10 | Stop reason: HOSPADM

## 2019-09-10 RX ORDER — MECLIZINE HYDROCHLORIDE 25 MG/1
25 TABLET ORAL EVERY 6 HOURS PRN
Qty: 20 TABLET | Refills: 0 | Status: SHIPPED | OUTPATIENT
Start: 2019-09-10

## 2019-09-10 RX ORDER — MECLIZINE HYDROCHLORIDE 25 MG/1
25 TABLET ORAL ONCE
Status: COMPLETED | OUTPATIENT
Start: 2019-09-10 | End: 2019-09-10

## 2019-09-10 RX ORDER — ONDANSETRON 2 MG/ML
4 INJECTION INTRAMUSCULAR; INTRAVENOUS ONCE
Status: COMPLETED | OUTPATIENT
Start: 2019-09-10 | End: 2019-09-10

## 2019-09-10 RX ORDER — LORAZEPAM 2 MG/ML
1 INJECTION INTRAMUSCULAR ONCE
Status: COMPLETED | OUTPATIENT
Start: 2019-09-10 | End: 2019-09-10

## 2019-09-10 RX ORDER — ONDANSETRON 4 MG/1
TABLET, ORALLY DISINTEGRATING ORAL
Qty: 20 TABLET | Refills: 0 | Status: SHIPPED | OUTPATIENT
Start: 2019-09-10 | End: 2020-07-01

## 2019-09-10 RX ADMIN — MECLIZINE HYDROCHLORIDE 25 MG: 25 TABLET ORAL at 20:12

## 2019-09-10 RX ADMIN — MECLIZINE HYDROCHLORIDE 25 MG: 25 TABLET ORAL at 18:38

## 2019-09-10 RX ADMIN — LORAZEPAM 1 MG: 2 INJECTION INTRAMUSCULAR; INTRAVENOUS at 18:22

## 2019-09-10 RX ADMIN — PROMETHAZINE HYDROCHLORIDE 25 MG: 25 INJECTION INTRAMUSCULAR; INTRAVENOUS at 18:22

## 2019-09-10 RX ADMIN — SODIUM CHLORIDE 1000 ML: 9 INJECTION, SOLUTION INTRAVENOUS at 18:22

## 2019-09-10 RX ADMIN — ONDANSETRON 4 MG: 2 SOLUTION INTRAMUSCULAR; INTRAVENOUS at 19:07

## 2019-09-10 NOTE — ED PROVIDER NOTES
" EMERGENCY DEPARTMENT ENCOUNTER      Room Number: 4/04      HPI:    Chief complaint: Dizziness and vomiting    Location: Not applicable    Quality/Severity: Severe    Timing/Duration: Symptoms started several hours ago    Modifying Factors: Closing eyes and movement causes increased dizziness    Associated Symptoms: Diaphoresis and difficulty walking    Narrative: Pt is a 59 y.o. female who presents complaining of dizziness and vomiting as noted above.  Patient relates that her right ear was \"clogged up\" and she instilled hydrogen peroxide into the EAC.  Shortly thereafter the patient began to have severe dizziness with associated vomiting.  Patient relates to previous episodes of vertigo and this episode is similar to those events.      PMD: Aura Duckworth MD    REVIEW OF SYSTEMS  Review of Systems   Constitutional: Positive for diaphoresis. Negative for activity change, appetite change, fatigue and fever.   HENT: Negative for congestion.         Right ear fullness   Respiratory: Negative for cough, shortness of breath and wheezing.    Cardiovascular: Negative for chest pain, palpitations and leg swelling.   Gastrointestinal: Positive for vomiting (Multiple times). Negative for abdominal pain, diarrhea and nausea.   Endocrine: Negative for polydipsia.   Genitourinary: Negative for difficulty urinating, dysuria, flank pain, frequency and urgency.   Musculoskeletal: Positive for gait problem (Current balance problem due to dizziness). Negative for back pain.   Skin: Negative for rash.   Neurological: Positive for dizziness. Negative for weakness and headaches.   Psychiatric/Behavioral: Negative for confusion.   All other systems reviewed and are negative.      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), managed as type 1 (CMS/[facility]) 07/08/2016   • Hyperlipidemia 07/08/2016   • " Essential hypertension 2016   • Post-menopausal 2016   • Neck pain 2017   • Rotator cuff tendonitis 2017   • Seasonal allergic rhinitis 2017   • Type 2 diabetes mellitus (CMS/HCC) 2017   • Foot pain, right 2017   • Neuropathy of foot 2017   • Screen for colon cancer 2019   • Family history of polyps in the colon 2019   • Gastroesophageal reflux disease 2019   • Spondylolisthesis of lumbar region 2019   • Spinal stenosis, lumbar region, with neurogenic claudication 2019   • Obesity (BMI 30-39.9) 2019     Resolved Ambulatory Problems     Diagnosis Date Noted   • Chest pain 2017   • Diabetes mellitus (CMS/HCC) 2017   • Open wound 2017   • Postoperative wound infection 2017     Past Medical History:   Diagnosis Date   • AC (acromioclavicular) joint bone spurs    • Diabetes mellitus (CMS/HCC)    • GERD (gastroesophageal reflux disease)    • Herpes zoster    • Hyperlipidemia    • Hypertension    • Myocardial infarction (CMS/HCC)        PAST SURGICAL HISTORY  Past Surgical History:   Procedure Laterality Date   • BACK SURGERY     • BREAST LUMPECTOMY     • BREAST LUMPECTOMY      for benign lump   • CARDIAC SURGERY  2014   •  SECTION     • CORONARY ARTERY BYPASS GRAFT     • D&C WITH SUCTION      times 3 -miscarriages   • KNEE SURGERY     • LUMBAR DISCECTOMY FUSION INSTRUMENTATION Bilateral 2019    Procedure: Lumbar Laminectomy Lumbar 4 Lumbar 5 with posterior interbody allograft fusion using carbon fiber interbody device and posterior pedicle titanium screw fixation;  Surgeon: Harshad Madera MD;  [facility];  Service: Neurosurgery       FAMILY HISTORY  Family History   Problem Relation Age of Onset   • Hypertension Father    • Diabetes Father    • Glaucoma Father    • Hypotension Father    • Kidney failure Father    • Anxiety disorder Father    • Hypertension Sister    •  Diabetes Sister    • Anxiety disorder Sister    • Colon polyps Sister    • Osteoporosis Mother    • Cataracts Mother    • Heart failure Mother    • Diverticulitis Mother    • Colon polyps Brother    • Colon polyps Maternal Uncle    • Colon cancer Maternal Uncle    • Malig Hyperthermia Neg Hx        SOCIAL HISTORY  Social History     Socioeconomic History   • Marital status: Legally      Spouse name: Not on file   • Number of children: Not on file   • Years of education: Not on file   • Highest education level: Not on file   Tobacco Use   • Smoking status: Former Smoker     Packs/day: 1.00     Years: 15.00     Pack years: 15.00     Types: Cigarettes     Last attempt to quit: 2014     Years since quittin.1   • Smokeless tobacco: Never Used   Substance and Sexual Activity   • Alcohol use: Yes     Comment: SOCIALLY   • Drug use: No   • Sexual activity: Defer       ALLERGIES  Patient has no known allergies.    PHYSICAL EXAM  ED Triage Vitals   Temp Heart Rate Resp BP SpO2   09/10/19 1758 09/10/19 1755 09/10/19 1755 09/10/19 1757 09/10/19 1755   98.5 °F (36.9 °C) 80 22 144/81 97 %      Temp src Heart Rate Source Patient Position BP Location FiO2 (%)   09/10/19 1758 09/10/19 1755 -- -- --   Oral Monitor          Physical Exam   Constitutional: She is oriented to person, place, and time.   The patient is a moderately obese, 59-year-old, white female noted to be sitting stiffly in bed and staring straight ahead.  Patient reluctant to move eyes or head.   HENT:   Head: Normocephalic and atraumatic.   Eyes: Conjunctivae and EOM are normal. Pupils are equal, round, and reactive to light.   Neck: Normal range of motion. Neck supple. No thyromegaly present.   Cardiovascular: Normal rate and normal heart sounds.   No murmur heard.  Pulmonary/Chest: Effort normal and breath sounds normal. No respiratory distress.   Abdominal: Soft. Bowel sounds are normal. There is no tenderness.   Musculoskeletal: Normal range of  motion. She exhibits no edema.   Lymphadenopathy:     She has no cervical adenopathy.   Neurological: She is alert and oriented to person, place, and time. No cranial nerve deficit.   Skin: Skin is warm and dry.   Psychiatric: Affect and judgment normal.   Nursing note and vitals reviewed.      LAB RESULTS  Results for orders placed or performed during the hospital encounter of 09/10/19   Comprehensive Metabolic Panel   Result Value Ref Range    Glucose 231 (H) 65 - 99 mg/dL    BUN 10 6 - 20 mg/dL    Creatinine 0.84 0.57 - 1.00 mg/dL    Sodium 138 136 - 145 mmol/L    Potassium 4.1 3.5 - 5.2 mmol/L    Chloride 98 98 - 107 mmol/L    CO2 21.8 (L) 22.0 - 29.0 mmol/L    Calcium 9.4 8.6 - 10.5 mg/dL    Total Protein 7.6 6.0 - 8.5 g/dL    Albumin 4.20 3.50 - 5.20 g/dL    ALT (SGPT) 17 1 - 33 U/L    AST (SGOT) 16 1 - 32 U/L    Alkaline Phosphatase 156 (H) 39 - 117 U/L    Total Bilirubin 0.4 0.2 - 1.2 mg/dL    eGFR Non African Amer 69 >60 mL/min/1.73    Globulin 3.4 gm/dL    A/G Ratio 1.2 g/dL    BUN/Creatinine Ratio 11.9 7.0 - 25.0    Anion Gap 18.2 (H) 5.0 - 15.0 mmol/L   CBC Auto Differential   Result Value Ref Range    WBC 11.11 (H) 3.40 - 10.80 10*3/mm3    RBC 4.69 3.77 - 5.28 10*6/mm3    Hemoglobin 12.3 12.0 - 15.9 g/dL    Hematocrit 39.2 34.0 - 46.6 %    MCV 83.6 79.0 - 97.0 fL    MCH 26.2 (L) 26.6 - 33.0 pg    MCHC 31.4 (L) 31.5 - 35.7 g/dL    RDW 13.9 12.3 - 15.4 %    RDW-SD 42.5 37.0 - 54.0 fl    MPV 10.8 6.0 - 12.0 fL    Platelets 516 (H) 140 - 450 10*3/mm3    Neutrophil % 43.3 42.7 - 76.0 %    Lymphocyte % 39.5 19.6 - 45.3 %    Monocyte % 7.2 5.0 - 12.0 %    Eosinophil % 8.9 (H) 0.3 - 6.2 %    Basophil % 0.6 0.0 - 1.5 %    Immature Grans % 0.5 0.0 - 0.5 %    Neutrophils, Absolute 4.80 1.70 - 7.00 10*3/mm3    Lymphocytes, Absolute 4.39 (H) 0.70 - 3.10 10*3/mm3    Monocytes, Absolute 0.80 0.10 - 0.90 10*3/mm3    Eosinophils, Absolute 0.99 (H) 0.00 - 0.40 10*3/mm3    Basophils, Absolute 0.07 0.00 - 0.20 10*3/mm3     Immature Grans, Absolute 0.06 (H) 0.00 - 0.05 10*3/mm3    nRBC 0.0 0.0 - 0.2 /100 WBC         I ordered the above labs and reviewed the results    RADIOLOGY  Xr Spine Lumbar 2 Or 3 View    Result Date: 8/21/2019  Narrative: TWO-VIEW PORTABLE LUMBAR SPINE IN OR  HISTORY: Localization imaging for surgery.  FINDINGS:  Imaging in the operating room was performed at the time of anterior and posterior fusion surgery at L4-5 and this includes posterior plates and pedicle screws. Three views were obtained and the fluoroscopy time measures 1 minute 7 seconds.  This report was finalized on 8/21/2019 11:33 AM by Dr. [place] M.D.      Fl C Arm During Surgery    Result Date: 8/21/2019  Narrative: This procedure was auto-finalized with no dictation required.      I ordered the above radiologic testing and reviewed the results    PROCEDURES  Procedures      PROGRESS AND CONSULTS  ED Course as of Sep 10 2030   Tue Sep 10, 2019   1846 After Ativan and Phenergan patient now much more relaxed and able to keep her eyes closed for short periods of time.  No vomiting or heaving at this time.  Old records reviewed and patient in our department with similar symptoms in November 2017.  Negative head CT during that visit and ENT follow-up recommended.  Patient queried and she did not follow-up with ENT.  [ML]   2004 Patient had repeated vomiting and Zofran given with relief of symptoms.  Patient still complaining of dizziness, although not as severe.  Will re-dose Antivert as the patient may have inadvertently lost this medication with her previous episode of vomiting.  Patient now with complaint of altered hearing on the right.  Patient strongly advised to follow up with ENT for possible Ménière's.  [ML]   2025 No further vomiting in the emergency department.  [ML]      ED Course User Index  [ML] Sandip Campbell MD           MEDICAL DECISION MAKING  Results were reviewed/discussed with the patient and they were also made aware  of online access. Pt also made aware that some labs, such as cultures, will not be resulted during ER visit and follow up with PMD is necessary.     MDM  Number of Diagnoses or Management Options  Vestibular dizziness involving left inner ear: new and requires workup     Amount and/or Complexity of Data Reviewed  Clinical lab tests: ordered and reviewed  Tests in the radiology section of CPT®: ordered and reviewed  Review and summarize past medical records: yes    Risk of Complications, Morbidity, and/or Mortality  Presenting problems: moderate  Diagnostic procedures: moderate  Management options: moderate    Patient Progress  Patient progress: improved         DIAGNOSIS  Final diagnoses:   Vestibular dizziness involving left inner ear       Latest Documented Vital Signs:  As of 8:30 PM  BP- 143/81 HR- 80 Temp- 98.5 °F (36.9 °C) (Oral) O2 sat- 97%    DISPOSITION  Discharged in stable condition       Medication List      New Prescriptions    meclizine 25 MG tablet  Commonly known as:  ANTIVERT  Take 1 tablet by mouth Every 6 (Six) Hours As Needed for dizziness.     ondansetron ODT 4 MG disintegrating tablet  Commonly known as:  ZOFRAN-ODT  1 tablet every 4 hours as needed for nausea or vomiting        Changed    carvedilol 3.125 MG tablet  Commonly known as:  COREG  TAKE ONE TABLET BY MOUTH TWICE A DAY  What changed:    how much to take  how to take this  when to take this  additional instructions          Follow-up Information     Schedule an appointment as soon as possible for a visit  with Thomas Bo MD.    Specialty:  Otolaryngology  Why:  ENT  Contact information:  [address] [facility] [address]  [place] [address] [phone]             Aura Duckworth MD.    Specialties:  Internal Medicine & Pediatrics, Pediatrics  Why:  As needed  Contact information:  [address] [facility] [address]  [facility] [address] [phone]                      Sandip Campbell MD  09/10/19 7480

## 2019-09-10 NOTE — ED NOTES
Pt appeared to be wretching while holding emesis bag during time of triage but no emesis noted     Monse Thompson, MAURA  09/10/19 7261

## 2019-09-11 NOTE — ED NOTES
Pt's family is going to see if they can get prescriptions filled before pharmacy closes.  Pt will stay here until they return.     Laila Marrero, RN  09/10/19 2041

## 2019-09-11 NOTE — ED NOTES
Pt given another dose of meclizine.  Pt instructed to take with the smallest amount of water possible.       Laila Marrero RN  09/10/19 2023

## 2019-09-13 DIAGNOSIS — E78.5 HYPERLIPIDEMIA, UNSPECIFIED HYPERLIPIDEMIA TYPE: ICD-10-CM

## 2019-09-13 DIAGNOSIS — E13.9 LATENT AUTOIMMUNE DIABETES IN ADULTS (LADA), MANAGED AS TYPE 1 (HCC): ICD-10-CM

## 2019-09-13 DIAGNOSIS — I25.10 CHRONIC CORONARY ARTERY DISEASE: ICD-10-CM

## 2019-09-13 RX ORDER — ATORVASTATIN CALCIUM 40 MG/1
TABLET, FILM COATED ORAL
Qty: 30 TABLET | Refills: 0 | Status: SHIPPED | OUTPATIENT
Start: 2019-09-13 | End: 2019-10-19 | Stop reason: SDUPTHER

## 2019-09-16 ENCOUNTER — TELEPHONE (OUTPATIENT)
Dept: NEUROSURGERY | Facility: CLINIC | Age: 59
End: 2019-09-16

## 2019-09-16 NOTE — TELEPHONE ENCOUNTER
Patient last saw Daisy on 09/04/19. She I almost 4 weeks out from having a Lumbar Laminectomy L4-L5 with posterior interbody allograft fusion using carbon fiber interbody device and posterior pedicle titanium screw fixation on 08/21/19 by Dr. Madera

## 2019-09-16 NOTE — TELEPHONE ENCOUNTER
Patient called and wanted to know when she start driving, she had forgot to ask at the last appointment.

## 2019-09-26 DIAGNOSIS — I10 ESSENTIAL HYPERTENSION: ICD-10-CM

## 2019-09-26 RX ORDER — LISINOPRIL 40 MG/1
TABLET ORAL
Qty: 90 TABLET | Refills: 0 | Status: SHIPPED | OUTPATIENT
Start: 2019-09-26 | End: 2019-12-30

## 2019-10-09 ENCOUNTER — OFFICE VISIT (OUTPATIENT)
Dept: NEUROSURGERY | Facility: CLINIC | Age: 59
End: 2019-10-09

## 2019-10-09 ENCOUNTER — HOSPITAL ENCOUNTER (OUTPATIENT)
Dept: GENERAL RADIOLOGY | Facility: HOSPITAL | Age: 59
Discharge: HOME | End: 2019-10-09
Admitting: NURSE PRACTITIONER

## 2019-10-09 VITALS
HEIGHT: 68 IN | HEART RATE: 86 BPM | BODY MASS INDEX: 31.83 KG/M2 | DIASTOLIC BLOOD PRESSURE: 84 MMHG | WEIGHT: 210 LBS | SYSTOLIC BLOOD PRESSURE: 134 MMHG

## 2019-10-09 DIAGNOSIS — M48.062 SPINAL STENOSIS, LUMBAR REGION, WITH NEUROGENIC CLAUDICATION: ICD-10-CM

## 2019-10-09 DIAGNOSIS — M43.16 SPONDYLOLISTHESIS OF LUMBAR REGION: ICD-10-CM

## 2019-10-09 DIAGNOSIS — M48.062 SPINAL STENOSIS, LUMBAR REGION, WITH NEUROGENIC CLAUDICATION: Primary | ICD-10-CM

## 2019-10-09 PROCEDURE — 99024 POSTOP FOLLOW-UP VISIT: CPT | Performed by: NURSE PRACTITIONER

## 2019-10-09 PROCEDURE — 72100 X-RAY EXAM L-S SPINE 2/3 VWS: CPT

## 2019-10-09 NOTE — PROGRESS NOTES
"Subjective   History of Present Illness: Kristine Rivas is a 59 y.o. female is here today for post op appt with lumbar spine plain films done today at [facility].      Patient had surgery 8.21.19 by Dr Madera, lumbar laminectomy L4/5 with posterior fusion and posterior pedicle screw fixation    Patient was last seen 9.4.19 for post op residual leg pain.    Patient states that she is currently having intermittent mild to moderate low back pain,worse at night and with prolonged standing.  Patient denies leg pain or weakness, but has intermittent left foot pain and numbness right foot/toes.  She is wearing her back brace, but is not taking any muscle relaxants or pain meds.    Back Pain   The problem occurs intermittently. The problem has been gradually improving since onset. The pain is present in the lumbar spine. The quality of the pain is described as aching. The pain does not radiate. The pain is at a severity of 4/10. The pain is mild. The symptoms are aggravated by standing and lying down. Associated symptoms include numbness and weakness. Pertinent negatives include no leg pain.   Foot Pain   Associated symptoms include numbness and weakness. Pertinent negatives include no arthralgias or myalgias.       The following portions of the patient's history were reviewed and updated as appropriate: allergies, current medications, past family history, past medical history, past social history, past surgical history and problem list.    Review of Systems   Musculoskeletal: Positive for back pain. Negative for arthralgias, gait problem and myalgias.   Neurological: Positive for weakness and numbness.   All other systems reviewed and are negative.      Objective     Vitals:    10/09/19 1323   BP: 134/84   Pulse: 86   Weight: 95.3 kg (210 lb)   Height: 172.7 cm (67.99\")     Body mass index is 31.94 kg/m².      Physical Exam   Constitutional: She is oriented to person, place, and time. She appears well-developed and " well-nourished. No distress.   Cardiovascular: Normal pulses.   Pulmonary/Chest: Effort normal.   Neurological: She is alert and oriented to person, place, and time.   Reflex Scores:       Patellar reflexes are 0 on the right side and 0 on the left side.       Achilles reflexes are 0 on the right side and 0 on the left side.  Skin: Skin is warm and dry.   Psychiatric: She has a normal mood and affect.     Neurologic Exam     Mental Status   Oriented to person, place, and time.   Level of consciousness: alert    Motor Exam   Muscle bulk: normal  Overall muscle tone: normal    Strength   Right quadriceps: 5/5  Left quadriceps: 5/5  Right anterior tibial: 5/5  Left anterior tibial: 5/5  Right gastroc: 5/5  Left gastroc: 5/5    Sensory Exam   Light touch normal.     Gait, Coordination, and Reflexes     Reflexes   Right patellar: 0  Left patellar: 0  Right achilles: 0  Left achilles: 0    Incision lumbar well healed    Assessment/Plan   Independent Review of Radiographic Studies:      I personally reviewed the images from the following studies.    Lumbar Xray 10/9/19 was reviewed and reveals pedicles screws at L4 and L5 with posterior rods, no hardware malfunction    Medical Decision Making:      She is doing well. We will initiate physical therapy in the brace. We will see her back in one month with another film. If everything is going well she may be able to come out of the brace and do more therapy. We will then extend follow up to every three months for a year with films to evaluate the hardware and bony union. 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   Kristine was seen today for post-op, back pain, foot pain and numbness.    Diagnoses and all orders for this visit:    Spinal stenosis, lumbar region, with neurogenic claudication  -     Ambulatory Referral to Physical Therapy Evaluate and treat, POST OP  -     XR Spine Lumbar 2 or 3 View;  Future    Spondylolisthesis of lumbar region  -     Ambulatory Referral to Physical Therapy Evaluate and treat, POST OP  -     XR Spine Lumbar 2 or 3 View; Future      Return in about 4 weeks (around 11/6/2019) for After radiographic tests.

## 2019-10-15 ENCOUNTER — HOSPITAL ENCOUNTER (OUTPATIENT)
Dept: PHYSICAL THERAPY | Facility: HOSPITAL | Age: 59
Setting detail: THERAPIES SERIES
Discharge: HOME | End: 2019-10-15

## 2019-10-15 DIAGNOSIS — M43.16 SPONDYLOLISTHESIS OF LUMBAR REGION: Primary | ICD-10-CM

## 2019-10-15 DIAGNOSIS — M48.062 SPINAL STENOSIS, LUMBAR REGION, WITH NEUROGENIC CLAUDICATION: ICD-10-CM

## 2019-10-15 PROCEDURE — 97110 THERAPEUTIC EXERCISES: CPT

## 2019-10-15 PROCEDURE — 97161 PT EVAL LOW COMPLEX 20 MIN: CPT

## 2019-10-15 NOTE — THERAPY EVALUATION
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), managed as type 1 (CMS/HCC)   • Hyperlipidemia   • Essential hypertension   • Post-menopausal   • Neck pain   • Rotator cuff tendonitis   • Seasonal allergic rhinitis   • Type 2 diabetes mellitus (CMS/HCC)   • Foot pain, right   • Neuropathy of foot   • Screen for colon cancer   • Family history of polyps in the colon   • Gastroesophageal reflux disease   • Spondylolisthesis of lumbar region   • Spinal stenosis, lumbar region, with neurogenic claudication   • Obesity (BMI 30-39.9)        Past Medical History:   Diagnosis Date   • AC (acromioclavicular) joint bone spurs    • Diabetes mellitus (CMS/HCC)    • GERD (gastroesophageal reflux disease)    • Herpes zoster    • Hyperlipidemia    • Hypertension    • Myocardial infarction (CMS/HCC)         Past Surgical History:   Procedure Laterality Date   • BACK SURGERY     • BREAST LUMPECTOMY     • BREAST LUMPECTOMY      for benign lump   • CARDIAC SURGERY  2014   •  SECTION     • CORONARY ARTERY BYPASS GRAFT     • D&C WITH SUCTION      times 3 -miscarriages   • KNEE SURGERY     • LUMBAR DISCECTOMY FUSION INSTRUMENTATION Bilateral 2019    Procedure: Lumbar Laminectomy Lumbar 4 Lumbar 5 with posterior interbody allograft fusion using carbon fiber interbody device and posterior pedicle titanium screw fixation;  Surgeon: Harshad Madera MD;  Location: [facility];  Service: Neurosurgery       Visit Dx:     ICD-10-CM ICD-9-CM   1. Spondylolisthesis of lumbar region M43.16 738.4   2. Spinal stenosis, lumbar region, with neurogenic claudication M48.062 724.03         Patient History     Row Name 10/15/19 1100             History     "Chief Complaint  Pain;Difficulty with daily activities;Joint stiffness;Muscle tenderness;Muscle weakness;Tightness  -LN      Type of Pain  Back pain  -LN      Date Current Problem(s) Began  08/21/19  -LN      Brief Description of Current Complaint  Patient had been having trouble with her feet and legs for 2 years and \"they discovered the pain was coming from my spine.\" She ended up having lumbar surgery on 8/21/2019 -  Lumbar Laminectomy Lumbar 4 Lumbar 5 with posterior interbody allograft fusion using carbon fiber interbody device and posterior pedicle titanium screw fixation. \"I have 2 rods and screws.\"  \"I have to do a month of therapy in the brace and then 1 month after they discharge the brace.\"  Used a RWX for 2 weeks and then uses a SPC or walking stick PRN.  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 She likes to do photography; arts, take walks and play with grandkids.  -LN      Patient seeing anyone else for problem(s)?  neurosurgeon  -LN      How has patient tried to help current problem?  ice; rest; takes daily walks  -LN      What clinical tests have you had for this problem?  X-ray;MRI  -LN      Related/Recent Hospitalizations  Yes  -LN      Date of Hospitalization  08/21/19  -LN      Surgery/Hospitalization  s/p lumbar laminectomy with hardware L4,L5.  -LN      History of Previous Related Injuries  " none reported  -LN         Pain     Pain Location  Back central  -LN      Pain at Present  3  -LN      Pain at Best  0  -LN      Pain at Worst  10 but only for a sec; normally 3-4/10  -LN      Pain Frequency  Intermittent  -LN      Pain Description  Aching;Tightness  -LN      What Performance Factors Make the Current Problem(s) WORSE?  standing; walking too much; rolling over in bed  -LN      What Performance Factors Make the Current Problem(s) BETTER?  in bed with wedge; sits up straight.   -LN      Tolerance Time- Standing  3 minute limit  -LN      Tolerance Time- Sitting  does okay if she shifts positions  -LN      Tolerance Time- Walking  likes to walk- used to walking about 10 miles a day; on uneven ground  -LN      Tolerance Time- Lying  pain with rolling over.   -LN      Is your sleep disturbed?  Yes  -LN      Is medication used to assist with sleep?  Yes has insomnia- takes tylenol/melatonin  -LN      What position do you sleep in?  Supine on wedge  -LN      Difficulties at work?  not presently working  -LN      Difficulties with ADL's?  okay but not allowed to bend/lift or twist.   -LN      Difficulties with recreational activities?  yes  -LN         Fall Risk Assessment    Any falls in the past year:  No  -LN      Does patient have a fear of falling  Yes (comment) since surgery  -LN         Services    Prior Rehab/Home Health Experiences  Yes  -LN      When was the prior experience with Rehab/Home Health  cardiac rehab and for knee  -LN      Where was the prior experience with Rehab/Home Health  [facility] for cardiac rehab  -LN      Are you currently receiving Home Health services  No  -LN      Do you plan to receive Home Health services in the near future  No  -LN         Daily Activities    Primary Language  English  -LN      Are you able to read  Yes  -LN      Are you able to write  Yes  -LN      How does patient learn best?  Listening;Reading;Demonstration;Pictures/Video  -LN       "Teaching needs identified  Home Exercise Program;Management of Condition;Other (comment) Risks and benefits of treatment explained to patient.  -LN      Patient is concerned about/has problems with  Bed Mobility;Difficulty with self care (i.e. bathing, dressing, toileting:;Flexibility;Grasping objects lifting;Performing home management (household chores, shopping, care of dependents);Performing job responsibilities/community activities (work, school,;Performing sports, recreation, and play activities;Standing;Walking  -LN      Does patient have problems with the following?  None  -LN      Barriers to learning  None  -LN      Pt Participated in POC and Goals  Yes  -LN         Safety    Are you being hurt, hit, or frightened by anyone at home or in your life?  No  -LN      Are you being neglected by a caregiver  No  -LN        User Key  (r) = Recorded By, (t) = Taken By, (c) = Cosigned By    Initials Name Provider Type    LN Bhavna Winter, PT Physical Therapist          PT Ortho     Row Name 10/15/19 1100       Subjective Comments    Subjective Comments  She reports pain in central LB at area of surgery; with occas sharp pain with movement. Decreased pain at rest. \"I can tell if I don't have my back brace on, my muscles are very weak.\"  She reports no longer having leg pain except for occas sharp pain in area of left medial ankle/lower leg and reports numbness in 2nd toe on right foot.   -LN       Precautions and Contraindications    Precautions/Limitations  lifting restrictions (specify in comments);brace on when up  -LN    Precautions  no lifting/bending/twisting; wears brace when up all the time; doesn't have to sleep in it.   -LN       Subjective Pain    Able to rate subjective pain?  yes  -LN    Pre-Treatment Pain Level  3  -LN    Post-Treatment Pain Level  3  -LN       Posture/Observations    Forward Head  Mild  -LN    Rounded Shoulders  Bilateral:;Moderate  -LN    Iliac crests  " Left:;Mild;Elevated;Standing posture  -LN    Posture/Observations Comments  With back brace on.   -LN       Lumbar/SI Special Tests    SLR (Neural Tension)  Bilateral:;Negative  -LN    SHEREE (hip vs. SI Dysfunction)  Bilateral:;Negative  -LN    FAIR Test (Piriformis Syndrome)  Bilateral:;Negative  -LN    Lumbar/SI Special Tests Comments  limited secondary to recent lumbar surgery and her restrictions from surgeon.  -LN       General ROM    GENERAL ROM COMMENTS  Trunk ROM NT at this time secondary to restrictions; Bilateral LE WFL.   -LN       MMT Neck/Trunk    Trunk Flexion MMT, Gross Movement  (3-/5) fair minus  -LN    Trunk Extension MMT, Gross Movement  (2+/5) poor plus  -LN    Neck/Trunk Comments   grossly- with back brace intact  -LN       MMT Right Lower Ext    Rt Hip Flexion MMT, Gross Movement  (5/5) normal  -LN    Rt Hip Extension MMT, Gross Movement  (5/5) normal  -LN    Rt Hip ABduction MMT, Gross Movement  (5/5) normal  -LN    Rt Hip ADduction MMT, Gross Movement  (5/5) normal  -LN    Rt Knee Extension MMT, Gross Movement  (5/5) normal  -LN    Rt Knee Flexion MMT, Gross Movement  (5/5) normal  -LN    Rt Ankle Plantarflexion MMT, Gross Movement  (5/5) normal  -LN    Rt Ankle Dorsiflexion MMT, Gross Movement  (5/5) normal  -LN       MMT Left Lower Ext    Lt Hip Flexion MMT, Gross Movement  (5/5) normal  -LN    Lt Hip Extension MMT, Gross Movement  (5/5) normal  -LN    Lt Hip ABduction MMT, Gross Movement  (5/5) normal  -LN    Lt Hip ADduction MMT, Gross Movement  (5/5) normal  -LN    Lt Knee Extension MMT, Gross Movement  (5/5) normal  -LN    Lt Knee Flexion MMT, Gross Movement  (5/5) normal  -LN    Lt Ankle Plantarflexion MMT, Gross Movement  (5/5) normal  -LN    Lt Ankle Dorsiflexion MMT, Gross Movement  (5/5) normal  -LN       Sensation    Sensation WNL?  WFL  -LN    Light Touch  Partial deficits in the RLE  -LN    Additional Comments  numbness in 2nd toe right foot; has some numbness left lower leg  "from CABG.  -LN       Lower Extremity Flexibility    Hamstrings  Bilateral:;Moderately limited  -LN    Hip Adductors  Right:;Mildly limited;Left:;Moderately limited  -LN    Gastrocnemius  Bilateral:;Mildly limited  -LN    Soleus  Bilateral:;Mildly limited  -LN       Transfers    Sit-Stand [place] (Transfers)  independent  -LN    Stand-Sit [place] (Transfers)  independent  -LN    Transfers, Sit-Stand-Sit, Assist Device  other (see comments) none  -LN       Gait/Stairs Assessment/Training    [place] Level (Gait)  independent  -LN    Assistive Device (Gait)  other (see comments) none  -LN    Deviations/Abnormal Patterns (Gait)  antalgic;keith decreased;gait speed decreased  -LN    Comment (Gait/Stairs)  She reports she can do stairs but has to go slow.  She reports using a SPC/walking stick PRN, but not very often.   -LN      User Key  (r) = Recorded By, (t) = Taken By, (c) = Cosigned By    Initials Name Provider Type    LN Bhavna Winter, PT Physical Therapist                      Therapy Education  Education Details: Patient to work on HEP 2 x day as tolerated in \"pain free range.\" She was instructed to decrease to 1 x day if exercises increase her pain level too much.  Spoke to patient about listening to her body and take rest breaks with walking/activity as needed.   Given: HEP, Symptoms/condition management, Pain management, Posture/body mechanics  Program: New  How Provided: Verbal, Demonstration, Written  Provided to: Patient  Level of Understanding: Teach back education performed, Verbalized, Demonstrated     PT OP Goals     Row Name 10/15/19 1100          PT Short Term Goals    STG Date to Achieve  10/29/19  -LN     STG 1  Patient to report decreased verbal rating of pain in LB to <3/10 with all ADLs & everday activities (with back brace on).   -LN     STG 2  Patient able to perform 10-15 reps gentle stretching and back/core stabilization exercises with back brace on with no c/o any " increased back pain.   -LN     STG 3  Patient to report improved standing tolerance (with back brace on ) to 10-15 minutes to allow her to do more cooking in the kitchen.   -LN     STG 4  Patient able to walk 1/2 mile with no difficulty and no c/o any increased back pain.   -LN        Long Term Goals    LTG Date to Achieve  11/12/19  -LN     LTG 1  Patient to report decreased verbal rating of pain in LB to 0-1/10 with all ADLs & everday activities (with back brace on).   -LN     LTG 2  Patient independent with HEP issued by therapist.   -LN     LTG 3  Patient to have improved core strength to 4/5.   -LN     LTG 4  Patient to have improved standing tolerance to 30-45 minutes to allow her to cook a meal.   -LN     LTG 5  Patient able to walk 1 mile with no difficulty and no c/o any increased back pain.   -LN        Time Calculation    PT Goal Re-Cert Due Date  11/12/19  -LN       User Key  (r) = Recorded By, (t) = Taken By, (c) = Cosigned By    Initials Name Provider Type    Bhavna Sheets, PT Physical Therapist          PT Assessment/Plan     Row Name 10/15/19 5109          PT Assessment    Functional Limitations  Impaired gait;Impaired locomotion;Limitations in functional capacity and performance;Performance in self-care ADL;Limitations in community activities;Limitation in home management;Performance in leisure activities;Performance in work activities  -LN     Impairments  Endurance;Gait;Pain;Range of motion;Posture;Muscle strength;Sensation;Locomotion;Impaired flexibility  -LN     Assessment Comments  Patient presents 8 weeks s/p L4,L5 laminectomy/fusion with hardware with c/o central LBP, decreased core/trunk strength, decreased flexibility in LE; limited trunk ROM (very limited post-op at this time); decreased standing/walking/overall activity tolerance.  Patient will benefit from a gentle stretching and core stabilization program with modalities PRN and patient education.  Will increase exercises  "as MD allows and when she is discharged from wearing back brace.  Patient does have normal LE strength bilaterally.   -LN     Please refer to paper survey for additional self-reported information  Yes  -LN     Rehab Potential  Good  -LN     Patient/caregiver participated in establishment of treatment plan and goals  Yes  -LN     Patient would benefit from skilled therapy intervention  Yes  -LN        PT Plan    PT Frequency  2x/week;3x/week  -LN     Predicted Duration of Therapy Intervention (Therapy Eval)  6-8 weeks  -LN     Planned CPT's?  PT EVAL LOW COMPLEXITY: 78581;PT THER PROC EA 15 MIN: 33828;PT MANUAL THERAPY EA 15 MIN: 83177;PT HOT OR COLD PACK TREAT MCARE;PT ELECTRICAL STIM UNATTEND:   -LN     Physical Therapy Interventions (Optional Details)  home exercise program;modalities;postural re-education;patient/family education;ROM (Range of Motion);strengthening;stretching;taping;lumbar stabilization  -LN     PT Plan Comments  Progress with gentle stretching/core stabilization exercises as tolerated (with back brace on per MD); modalities PRN, patient education.  NO TRACTION.    -LN       User Key  (r) = Recorded By, (t) = Taken By, (c) = Cosigned By    Initials Name Provider Type    Bhavna Sheets, PT Physical Therapist          Modalities     Row Name 10/15/19 1100             Ice    Patient denies application of Ice  Yes  -LN      Patient reports will apply ice at home to involved area  Yes to use HP/CP PRN  -LN        User Key  (r) = Recorded By, (t) = Taken By, (c) = Cosigned By    Initials Name Provider Type    Bhavna Sheets, PT Physical Therapist        OP Exercises     Row Name 10/15/19 1100             Precautions    Existing Precautions/Restrictions  lifting;brace worn when out of bed  -LN         Subjective Comments    Subjective Comments  She reports pain in central LB at area of surgery; with occas sharp pain with movement. Decreased pain at rest. \"I can tell if I " "don't have my back brace on, my muscles are very weak.\"  She reports no longer having leg pain except for occas sharp pain in area of left medial ankle/lower leg and reports numbness in 2nd toe on right foot.   -LN         Subjective Pain    Able to rate subjective pain?  yes  -LN      Pre-Treatment Pain Level  3  -LN      Post-Treatment Pain Level  3  -LN         Total Minutes    18792 - PT Therapeutic Exercise Minutes  12 back brace on with all exercises and patient supine with HOB elevated. -LN         Exercise 1    Exercise Name 1  supine hamstring stretch with sheet- with HOB elevated  -LN      Cueing 1  Verbal;Tactile;Demo  -LN      Reps 1  5 each leg  -LN      Time 1  10 sec  -LN         Exercise 2    Exercise Name 2  PPT  -LN      Cueing 2  Verbal;Tactile;Demo  -LN      Reps 2  10  -LN      Time 2  5 sec  -LN      Additional Comments  in hooklying and in \"painfree\" range  -LN         Exercise 3    Exercise Name 3  Isometric hip adduction with ball  -LN      Cueing 3  Verbal;Tactile  -LN      Reps 3  10  -LN      Time 3  5 sec  -LN         Exercise 4    Exercise Name 4  Hooklying hip abduction vs. TB  -LN      Cueing 4  Verbal;Tactile  -LN      Reps 4  10  -LN      Time 4  5 sec  -LN      Additional Comments  blue TB; if this bothers her LB- to try 1 leg at a time.   -LN         Exercise 5    Exercise Name 5  GS  -LN      Cueing 5  Verbal;Demo  -LN      Reps 5  10  -LN      Time 5  5 sec  -LN      Additional Comments  with legs straight as tolerated.  -LN        User Key  (r) = Recorded By, (t) = Taken By, (c) = Cosigned By    Initials Name Provider Type    Bhavna Sheets, PT Physical Therapist                        Outcome Measure Options: Other Outcome Measure  Other Outcome Measure Tool Used  Other Outcome Measure Tool Comments: Back index- score of 16 today.       Time Calculation:     Start Time: 1110  Stop Time: 1200  Time Calculation (min): 50 min     Therapy Charges for Today     Code " Description Service Date Service Provider Modifiers Qty    40206842408 HC PT THER PROC EA 15 MIN 10/15/2019 Bhavna Winter, PT GP 1    30594731242 HC PT EVAL LOW COMPLEXITY 2 10/15/2019 Bhavna Winter, PT GP 1          PT G-Codes  Outcome Measure Options: Other Outcome Measure         Bhavna Winter, PT  10/15/2019

## 2019-10-18 ENCOUNTER — HOSPITAL ENCOUNTER (OUTPATIENT)
Dept: PHYSICAL THERAPY | Facility: HOSPITAL | Age: 59
Setting detail: THERAPIES SERIES
Discharge: HOME | End: 2019-10-18

## 2019-10-18 DIAGNOSIS — M48.062 SPINAL STENOSIS, LUMBAR REGION, WITH NEUROGENIC CLAUDICATION: ICD-10-CM

## 2019-10-18 DIAGNOSIS — M43.16 SPONDYLOLISTHESIS OF LUMBAR REGION: Primary | ICD-10-CM

## 2019-10-18 PROCEDURE — 97110 THERAPEUTIC EXERCISES: CPT

## 2019-10-18 NOTE — THERAPY TREATMENT NOTE
Outpatient Physical Therapy Ortho Treatment Note   [place]     Patient Name: Kristine Rivas  : 1960  MRN: [id number]  Today's Date: 10/18/2019      Visit Date: 10/18/2019    Visit Dx:    ICD-10-CM ICD-9-CM   1. Spondylolisthesis of lumbar region M43.16 738.4   2. 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), managed as type 1 (CMS/HCC)   • Hyperlipidemia   • Essential hypertension   • Post-menopausal   • Neck pain   • Rotator cuff tendonitis   • Seasonal allergic rhinitis   • Type 2 diabetes mellitus (CMS/HCC)   • Foot pain, right   • Neuropathy of foot   • Screen for colon cancer   • Family history of polyps in the colon   • Gastroesophageal reflux disease   • Spondylolisthesis of lumbar region   • Spinal stenosis, lumbar region, with neurogenic claudication   • Obesity (BMI 30-39.9)        Past Medical History:   Diagnosis Date   • AC (acromioclavicular) joint bone spurs    • Diabetes mellitus (CMS/HCC)    • GERD (gastroesophageal reflux disease)    • Herpes zoster    • Hyperlipidemia    • Hypertension    • Myocardial infarction (CMS/HCC)         Past Surgical History:   Procedure Laterality Date   • BACK SURGERY     • BREAST LUMPECTOMY     • BREAST LUMPECTOMY      for benign lump   • CARDIAC SURGERY  2014   •  SECTION     • CORONARY ARTERY BYPASS GRAFT     • D&C WITH SUCTION      times 3 -miscarriages   • KNEE SURGERY     • LUMBAR DISCECTOMY FUSION INSTRUMENTATION Bilateral 2019    Procedure: Lumbar Laminectomy Lumbar 4 Lumbar 5 with posterior interbody allograft fusion using carbon fiber interbody device and posterior pedicle titanium screw fixation;  Surgeon: Harshad Madera MD;  Location: [facility];  Service: Neurosurgery       PT Ortho     Row Name 10/18/19 1200       Precautions and Contraindications  "   Precautions/Limitations  lifting restrictions (specify in comments);brace on when up  -LN    Precautions  no lifting/bending/twisting; wears brace when up all the time; doesn't have to sleep in it.   -LN      User Key  (r) = Recorded By, (t) = Taken By, (c) = Cosigned By    Initials Name Provider Type    Bhavna Sheets, PT Physical Therapist                      PT Assessment/Plan     Row Name 10/18/19 1300          PT Assessment    Assessment Comments  Patient tolerated exercises fairly well with back brace on; does have some discomfort in left LS area with PPT and seated hip flexion. Treatment was limited today secondary to patient needing to take her son to work.   -LN        PT Plan    PT Frequency  2x/week;3x/week  -LN     PT Plan Comments  Continue per P.O.C.  Progress exercises as tolerated (with back brace on). Try recumbent bicycle next visit as tolerated. MH/CP PRN. Patient education.   -LN       User Key  (r) = Recorded By, (t) = Taken By, (c) = Cosigned By    Initials Name Provider Type    Bhavna Sheets, PT Physical Therapist          Modalities     Row Name 10/18/19 1300             Ice    Patient denies application of Ice  Yes  -LN      Patient reports will apply ice at home to involved area  Yes to use ice/HP/warm baths as needed and as tolerated  -LN        User Key  (r) = Recorded By, (t) = Taken By, (c) = Cosigned By    Initials Name Provider Type    Bhavna Sheets, PT Physical Therapist        OP Exercises     Row Name 10/18/19 1200             Precautions    Existing Precautions/Restrictions  lifting;brace worn when out of bed  -LN         Subjective Comments    Subjective Comments  \"I am hurting more today for some reason and I'm hurting higher up for some reason.\" \"It may be from the way I slept last night.\"   -LN         Subjective Pain    Able to rate subjective pain?  yes  -LN      Pre-Treatment Pain Level  4 4-5/10   -LN      Subjective Pain Comment  " "and having pain in midback area  -LN         Total Minutes    94617 - PT Therapeutic Exercise Minutes  15 back brace on for all exercises  -LN         Exercise 1    Exercise Name 1  supine hamstring stretch with sheet- with HOB elevated  -LN      Cueing 1  Verbal;Tactile;Demo  -LN      Reps 1  7 each leg  -LN      Time 1  10 sec  -LN         Exercise 2    Exercise Name 2  PPT  -LN      Cueing 2  Verbal;Tactile;Demo  -LN      Reps 2  10  -LN      Time 2  5 sec  -LN      Additional Comments  in hooklying and in \"painfree\" range  -LN         Exercise 3    Exercise Name 3  Isometric hip adduction with ball  -LN      Cueing 3  Verbal;Tactile  -LN      Reps 3  10  -LN      Time 3  5 sec  -LN         Exercise 4    Exercise Name 4  Hooklying hip abduction vs. TB  -LN      Cueing 4  Verbal;Tactile  -LN      Reps 4  10  -LN      Time 4  5 sec  -LN      Additional Comments  blue TB; if this bothers her LB- to try 1 leg at a time.   -LN         Exercise 5    Exercise Name 5  GS  -LN      Cueing 5  Verbal;Demo  -LN      Reps 5  10  -LN      Time 5  5 sec  -LN      Additional Comments  with legs straight  -LN         Exercise 6    Exercise Name 6  seated hip flexion with stomach muscles tight  -LN      Cueing 6  Verbal;Demo  -LN      Reps 6  6 each leg  -LN      Additional Comments  to progress to 10 reps as tolerated  -LN         Exercise 7    Exercise Name 7  seated LAQ with stomach muscles tight  -LN      Cueing 7  Verbal;Demo  -LN      Reps 7  10 each leg  -LN        User Key  (r) = Recorded By, (t) = Taken By, (c) = Cosigned By    Initials Name Provider Type    Bhavna Sheets, PT Physical Therapist                           Therapy Education  Education Details: Patient to continue to work on HEP 1-2 x day as tolerated and use MH/CP/warm baths as needed and as tolerated at home.   Given: HEP, Symptoms/condition management, Pain management, Posture/body mechanics  Program: New, Reinforced(added seated hip flexion " and seated LAQ with stomach muscles tight.)  How Provided: Verbal, Demonstration, Written  Provided to: Patient  Level of Understanding: Teach back education performed, Verbalized, Demonstrated              Time Calculation:   Start Time: 1240  Stop Time: 1310  Time Calculation (min): 30 min  Therapy Charges for Today     Code Description Service Date Service Provider Modifiers Qty    87611666247 HC PT THER PROC EA 15 MIN 10/18/2019 Bhavna Winter, PT GP 1                    Bhavna Winter, PT  10/18/2019

## 2019-10-19 DIAGNOSIS — E13.9 LATENT AUTOIMMUNE DIABETES IN ADULTS (LADA), MANAGED AS TYPE 1 (HCC): ICD-10-CM

## 2019-10-19 DIAGNOSIS — E78.5 HYPERLIPIDEMIA, UNSPECIFIED HYPERLIPIDEMIA TYPE: ICD-10-CM

## 2019-10-19 DIAGNOSIS — I25.10 CHRONIC CORONARY ARTERY DISEASE: ICD-10-CM

## 2019-10-21 RX ORDER — ATORVASTATIN CALCIUM 40 MG/1
TABLET, FILM COATED ORAL
Qty: 30 TABLET | Refills: 0 | Status: SHIPPED | OUTPATIENT
Start: 2019-10-21 | End: 2019-11-29 | Stop reason: SDUPTHER

## 2019-10-22 ENCOUNTER — TELEPHONE (OUTPATIENT)
Dept: NEUROSURGERY | Facility: CLINIC | Age: 59
End: 2019-10-22

## 2019-10-22 ENCOUNTER — HOSPITAL ENCOUNTER (OUTPATIENT)
Dept: PHYSICAL THERAPY | Facility: HOSPITAL | Age: 59
Setting detail: THERAPIES SERIES
Discharge: HOME | End: 2019-10-22

## 2019-10-22 DIAGNOSIS — M48.062 SPINAL STENOSIS, LUMBAR REGION, WITH NEUROGENIC CLAUDICATION: ICD-10-CM

## 2019-10-22 DIAGNOSIS — M43.16 SPONDYLOLISTHESIS OF LUMBAR REGION: Primary | ICD-10-CM

## 2019-10-22 PROCEDURE — 97110 THERAPEUTIC EXERCISES: CPT

## 2019-10-22 NOTE — THERAPY TREATMENT NOTE
Outpatient Physical Therapy Ortho Treatment Note   [place]     Patient Name: Kristine Rivas  : 1960  MRN: [id number]  Today's Date: 10/22/2019      Visit Date: 10/22/2019    Visit Dx:    ICD-10-CM ICD-9-CM   1. Spondylolisthesis of lumbar region M43.16 738.4   2. 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), managed as type 1 (CMS/HCC)   • Hyperlipidemia   • Essential hypertension   • Post-menopausal   • Neck pain   • Rotator cuff tendonitis   • Seasonal allergic rhinitis   • Type 2 diabetes mellitus (CMS/HCC)   • Foot pain, right   • Neuropathy of foot   • Screen for colon cancer   • Family history of polyps in the colon   • Gastroesophageal reflux disease   • Spondylolisthesis of lumbar region   • Spinal stenosis, lumbar region, with neurogenic claudication   • Obesity (BMI 30-39.9)        Past Medical History:   Diagnosis Date   • AC (acromioclavicular) joint bone spurs    • Diabetes mellitus (CMS/HCC)    • GERD (gastroesophageal reflux disease)    • Herpes zoster    • Hyperlipidemia    • Hypertension    • Myocardial infarction (CMS/HCC)         Past Surgical History:   Procedure Laterality Date   • BACK SURGERY     • BREAST LUMPECTOMY     • BREAST LUMPECTOMY      for benign lump   • CARDIAC SURGERY  2014   •  SECTION     • CORONARY ARTERY BYPASS GRAFT     • D&C WITH SUCTION      times 3 -miscarriages   • KNEE SURGERY     • LUMBAR DISCECTOMY FUSION INSTRUMENTATION Bilateral 2019    Procedure: Lumbar Laminectomy Lumbar 4 Lumbar 5 with posterior interbody allograft fusion using carbon fiber interbody device and posterior pedicle titanium screw fixation;  Surgeon: Harshad Madera MD;  Location: [facility] OR;  Service: Neurosurgery       PT Ortho     Row Name 10/22/19 1000       Subjective Comments    Subjective  "Comments  \"I didn't sleep at all last night because I am getting muscle spasms at night in my shoulders and upper back.\" \"I think I am going to call the doctor and see about getting a muscle relaxer for just at night so I can sleep.\"  \"When I first wake up my pain in my low back is a 3-4/10 but once I start moving around it decreases.\"   -LN       Precautions and Contraindications    Precautions/Limitations  lifting restrictions (specify in comments)  -LN    Precautions  no lifting/bending/twisting; wears brace when up all the time; doesn't have to sleep in it.   -LN       Subjective Pain    Able to rate subjective pain?  yes  -LN    Pre-Treatment Pain Level  3  -LN    Subjective Pain Comment  c/o achiness in upper back today  -LN      User Key  (r) = Recorded By, (t) = Taken By, (c) = Cosigned By    Initials Name Provider Type    Bhavna Sheets, PT Physical Therapist                      PT Assessment/Plan     Row Name 10/22/19 1000          PT Assessment    Assessment Comments  Patient tolerated exercises fairly well with only c/o mild pressure (\"no pain\") with pelvic tilt and seated/standing exercises.  She is having trouble sleeping but not because of back or leg pain, but c/o tightness in upper back and UT areas, which I feel is most likely secondary to  muscle compensation.  -LN        PT Plan    PT Frequency  2x/week;3x/week  -LN     PT Plan Comments  Continue per P.O.C.  Progress exercises as tolerated (with back brace on). Try recumbent bicycle next visit as tolerated. MH/CP PRN. Patient education.   -LN       User Key  (r) = Recorded By, (t) = Taken By, (c) = Cosigned By    Initials Name Provider Type    Bhavna Sheets, PT Physical Therapist          Modalities     Row Name 10/22/19 1000             Ice    Patient denies application of Ice  Yes  -LN      Patient reports will apply ice at home to involved area  Yes to use ice/HP/warm baths as needed and as tolerated  -LN        User " "Key  (r) = Recorded By, (t) = Taken By, (c) = Cosigned By    Initials Name Provider Type    Bhavna Sheets, PT Physical Therapist        OP Exercises     Row Name 10/22/19 1000             Precautions    Existing Precautions/Restrictions  brace worn when out of bed;lifting  -LN         Subjective Comments    Subjective Comments  \"I didn't sleep at all last night because I am getting muscle spasms at night in my shoulders and upper back.\" \"I think I am going to call the doctor and see about getting a muscle relaxer for just at night so I can sleep.\"  \"When I first wake up my pain in my low back is a 3-4/10 but once I start moving around it decreases.\"   -LN         Subjective Pain    Able to rate subjective pain?  yes  -LN      Pre-Treatment Pain Level  3  -LN      Subjective Pain Comment  c/o achiness in upper back today  -LN         Total Minutes    08484 - PT Therapeutic Exercise Minutes  17  -LN         Exercise 1    Exercise Name 1  supine hamstring stretch with sheet- with HOB elevated  -LN      Cueing 1  Verbal;Tactile;Demo  -LN      Reps 1  7 each leg  -LN      Time 1  10 sec  -LN         Exercise 2    Exercise Name 2  PPT  -LN      Cueing 2  Verbal;Tactile;Demo  -LN      Reps 2  10  -LN      Time 2  5 sec  -LN      Additional Comments  in hooklying and in \"painfree\" range  -LN         Exercise 3    Exercise Name 3  Isometric hip adduction with ball  -LN      Cueing 3  Verbal;Tactile  -LN      Reps 3  10  -LN      Time 3  5 sec  -LN         Exercise 4    Exercise Name 4  Hooklying hip abduction vs. TB  -LN      Cueing 4  Verbal;Tactile  -LN      Reps 4  10  -LN      Time 4  5 sec  -LN      Additional Comments  blue TB; if this bothers her LB- to try 1 leg at a time.   -LN         Exercise 5    Exercise Name 5  GS  -LN      Cueing 5  Verbal;Demo  -LN      Reps 5  10  -LN      Time 5  5 sec  -LN      Additional Comments  with legs straight  -LN         Exercise 6    Exercise Name 6  seated hip " flexion with stomach muscles tight  -LN      Cueing 6  Verbal;Demo  -LN      Reps 6  10 each leg  -LN         Exercise 7    Exercise Name 7  seated LAQ with stomach muscles tight  -LN      Cueing 7  Verbal;Demo  -LN      Reps 7  10 each leg  -LN         Exercise 8    Exercise Name 8  standing hip extension with stomach muscles tight  -LN      Cueing 8  Verbal;Demo  -LN      Reps 8  10  -LN      Additional Comments  with patient standing upright  -LN         Exercise 9    Exercise Name 9  standing hip abduction with stomach muscles tight  -LN      Cueing 9  Verbal;Demo  -LN      Reps 9  10  -LN      Additional Comments  with patient standing upright  -LN        User Key  (r) = Recorded By, (t) = Taken By, (c) = Cosigned By    Initials Name Provider Type    LN Bhavna Winter, PT Physical Therapist                           Therapy Education  Education Details: Patient to call MD about getting a muscle relaxer for at night only to help her sleep. To continue using MH/warm baths PRN and before bed.  Given: HEP, Symptoms/condition management, Pain management, Posture/body mechanics  Program: New, Reinforced, Progressed(added standing hip abd and standing hip extension with stomach muscles tight.)  How Provided: Verbal, Demonstration, Written  Provided to: Patient  Level of Understanding: Teach back education performed, Verbalized, Demonstrated              Time Calculation:   Start Time: 0959  Stop Time: 1025  Time Calculation (min): 26 min  Therapy Charges for Today     Code Description Service Date Service Provider Modifiers Qty    34147018649  PT THER PROC EA 15 MIN 10/22/2019 Bhavna Winter, PT GP 1                    Bhavna Winter, PT  10/22/2019

## 2019-10-22 NOTE — TELEPHONE ENCOUNTER
Patient had surgery 8.21.19 by Dr Madera, lumbar laminectomy L4/5 with posterior fusion and posterior pedicle screw fixation.  Pt is currently going to PT and is experiencing a lot of muscle spasms.  Would like a muscle relaxer called to [phone]  Pt [phone]

## 2019-10-23 RX ORDER — CYCLOBENZAPRINE HCL 10 MG
10 TABLET ORAL EVERY 8 HOURS PRN
Qty: 60 TABLET | Refills: 1 | Status: SHIPPED | OUTPATIENT
Start: 2019-10-23 | End: 2020-07-01 | Stop reason: SDUPTHER

## 2019-10-24 ENCOUNTER — DOCUMENTATION (OUTPATIENT)
Dept: PHYSICAL THERAPY | Facility: HOSPITAL | Age: 59
End: 2019-10-24

## 2019-10-29 ENCOUNTER — HOSPITAL ENCOUNTER (OUTPATIENT)
Dept: PHYSICAL THERAPY | Facility: HOSPITAL | Age: 59
Setting detail: THERAPIES SERIES
Discharge: HOME | End: 2019-10-29

## 2019-10-29 DIAGNOSIS — M48.062 SPINAL STENOSIS, LUMBAR REGION, WITH NEUROGENIC CLAUDICATION: ICD-10-CM

## 2019-10-29 DIAGNOSIS — M43.16 SPONDYLOLISTHESIS OF LUMBAR REGION: Primary | ICD-10-CM

## 2019-10-29 PROCEDURE — 97140 MANUAL THERAPY 1/> REGIONS: CPT

## 2019-10-29 PROCEDURE — 97110 THERAPEUTIC EXERCISES: CPT

## 2019-10-29 NOTE — THERAPY TREATMENT NOTE
Outpatient Physical Therapy Ortho Treatment Note   [place]     Patient Name: Kristine Rivas  : 1960  MRN: [id number]  Today's Date: 10/29/2019      Visit Date: 10/29/2019    Visit Dx:    ICD-10-CM ICD-9-CM   1. Spondylolisthesis of lumbar region M43.16 738.4   2. 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), managed as type 1 (CMS/HCC)   • Hyperlipidemia   • Essential hypertension   • Post-menopausal   • Neck pain   • Rotator cuff tendonitis   • Seasonal allergic rhinitis   • Type 2 diabetes mellitus (CMS/HCC)   • Foot pain, right   • Neuropathy of foot   • Screen for colon cancer   • Family history of polyps in the colon   • Gastroesophageal reflux disease   • Spondylolisthesis of lumbar region   • Spinal stenosis, lumbar region, with neurogenic claudication   • Obesity (BMI 30-39.9)        Past Medical History:   Diagnosis Date   • AC (acromioclavicular) joint bone spurs    • Diabetes mellitus (CMS/HCC)    • GERD (gastroesophageal reflux disease)    • Herpes zoster    • Hyperlipidemia    • Hypertension    • Myocardial infarction (CMS/HCC)         Past Surgical History:   Procedure Laterality Date   • BACK SURGERY     • BREAST LUMPECTOMY     • BREAST LUMPECTOMY      for benign lump   • CARDIAC SURGERY  2014   •  SECTION     • CORONARY ARTERY BYPASS GRAFT     • D&C WITH SUCTION      times 3 -miscarriages   • KNEE SURGERY     • LUMBAR DISCECTOMY FUSION INSTRUMENTATION Bilateral 2019    Procedure: Lumbar Laminectomy Lumbar 4 Lumbar 5 with posterior interbody allograft fusion using carbon fiber interbody device and posterior pedicle titanium screw fixation;  Surgeon: Harshad Madera MD;  Location: [facility] OR;  Service: Neurosurgery       PT Ortho     Row Name 10/29/19 1400       Subjective Comments    Subjective  "Comments  \"It's feeling pretty good; I just had a little pulling this morning.\"  \"I am sleeping better since I got a muscle relaxer.\"  \"They do make me feel a little dizzy though; I have had vertigo in the past.\"  \"I can't drive with them.\"  -LN       Precautions and Contraindications    Precautions/Limitations  lifting restrictions (specify in comments)  -LN    Precautions  no lifting/bending/twisting; wears brace when up all the time; doesn't have to sleep in it.   -LN       Subjective Pain    Able to rate subjective pain?  yes  -LN    Pre-Treatment Pain Level  0  -LN      User Key  (r) = Recorded By, (t) = Taken By, (c) = Cosigned By    Initials Name Provider Type    Bhavna Sheets, PT Physical Therapist                      PT Assessment/Plan     Row Name 10/29/19 1500          PT Assessment    Assessment Comments  Patient with overall decreased LBP but c/o discomfort in left PSIS/hip area. Did need MET for pelvic alignment.  She is tolerating exercises well; just limited by back brace.  Patient is sleeping much better since MD prescribed her a muscle relaxer at night.   -LN        PT Plan    PT Frequency  2x/week;3x/week  -LN     PT Plan Comments  Continue per P.O.C.  Progress exercises as tolerated (with back brace on). Try recumbent bicycle next visit as tolerated. MH/CP PRN. Patient education.   -LN       User Key  (r) = Recorded By, (t) = Taken By, (c) = Cosigned By    Initials Name Provider Type    Bhavna Sheets, PT Physical Therapist            OP Exercises     Row Name 10/29/19 1500 10/29/19 1400          Precautions    Existing Precautions/Restrictions  --  brace worn when out of bed  -LN        Subjective Comments    Subjective Comments  --  \"It's feeling pretty good; I just had a little pulling this morning.\"   -LN        Subjective Pain    Able to rate subjective pain?  --  yes  -LN     Pre-Treatment Pain Level  --  0  -LN        Total Minutes    19909 - PT Therapeutic " "Exercise Minutes  --  17  -LN     75165 - PT Manual Therapy Minutes  6  -LN  --        Exercise 1    Exercise Name 1  --  supine hamstring stretch with sheet- with HOB elevated  -LN     Cueing 1  --  Verbal;Tactile;Demo  -LN     Reps 1  --  7 each leg  -LN     Time 1  --  10 sec  -LN        Exercise 2    Exercise Name 2  --  PPT  -LN     Cueing 2  --  Verbal;Tactile;Demo  -LN     Reps 2  --  10  -LN     Time 2  --  5 sec  -LN     Additional Comments  --  in hooklying and in \"painfree\" range  -LN        Exercise 3    Exercise Name 3  --  Isometric hip adduction with ball  -LN     Cueing 3  --  Verbal;Tactile  -LN     Reps 3  --  10  -LN     Time 3  --  5 sec  -LN        Exercise 4    Exercise Name 4  --  Hooklying hip abduction vs. TB  -LN     Cueing 4  --  Verbal;Tactile  -LN     Reps 4  --  10  -LN     Time 4  --  5 sec  -LN     Additional Comments  --  blue TB; if this bothers her LB- to try 1 leg at a time.   -LN        Exercise 5    Exercise Name 5  --  GS  -LN     Cueing 5  --  Verbal;Demo  -LN     Reps 5  --  10  -LN     Time 5  --  5 sec  -LN     Additional Comments  --  with legs straight  -LN        Exercise 6    Exercise Name 6  --  seated hip flexion with stomach muscles tight  -LN     Cueing 6  --  Verbal;Demo  -LN     Reps 6  --  10 each leg  -LN        Exercise 7    Exercise Name 7  --  seated LAQ with stomach muscles tight  -LN     Cueing 7  --  Verbal;Demo  -LN     Reps 7  --  10 each leg  -LN        Exercise 8    Exercise Name 8  --  standing hip extension with stomach muscles tight  -LN     Cueing 8  --  Verbal;Demo  -LN     Reps 8  --  10  -LN     Additional Comments  --  with patient standing upright  -LN        Exercise 9    Exercise Name 9  --  standing hip abduction with stomach muscles tight  -LN     Cueing 9  --  Verbal;Demo  -LN     Reps 9  --  10  -LN     Additional Comments  --  with patient standing upright  -LN        Exercise 10    Exercise Name 10  --  supine dead bug   -LN     Cueing " 10  --  Verbal;Demo  -LN     Reps 10  --  7  -LN       User Key  (r) = Recorded By, (t) = Taken By, (c) = Cosigned By    Initials Name Provider Type    Bhavna Sheets, PT Physical Therapist                      Manual Rx (last 36 hours)      Manual Treatments     Row Name 10/29/19 1500             Total Minutes    31519 - PT Manual Therapy Minutes  6  -LN         Manual Rx 1    Manual Rx 1 Location  pelvis  -LN      Manual Rx 1 Type  MET- shotgun/left anterior innominate/left pelvic outflare  -LN      Manual Rx 1 Duration  Good pelvic alignment noted after MET.   -LN        User Key  (r) = Recorded By, (t) = Taken By, (c) = Cosigned By    Initials Name Provider Type    Bhavna Sheets, PT Physical Therapist              Therapy Education  Given: HEP, Symptoms/condition management, Pain management, Posture/body mechanics  Program: New, Reinforced(added dead bug core exercise)  How Provided: Verbal, Demonstration, Written  Provided to: Patient  Level of Understanding: Teach back education performed, Verbalized, Demonstrated              Time Calculation:   Start Time: 1450  Stop Time: 1520  Time Calculation (min): 30 min  Therapy Charges for Today     Code Description Service Date Service Provider Modifiers Qty    06757416722  PT MANUAL THERAPY EA 15 MIN 10/29/2019 Bhavna Winter, PT GP 1    [id number]  PT THER PROC EA 15 MIN 10/29/2019 Bhavna Winter, PT GP 1                    Bhavna Winter, PT  10/29/2019

## 2019-10-31 ENCOUNTER — DOCUMENTATION (OUTPATIENT)
Dept: PHYSICAL THERAPY | Facility: HOSPITAL | Age: 59
End: 2019-10-31

## 2019-10-31 ENCOUNTER — APPOINTMENT (OUTPATIENT)
Dept: PHYSICAL THERAPY | Facility: HOSPITAL | Age: 59
End: 2019-10-31

## 2019-11-05 NOTE — PROGRESS NOTES
Subjective   History of Present Illness: Kristine Rivas is a 59 y.o. female is here today for post op appt after PT and with plain lumbar spine plain films done at [facility] today. She is wearing her back brace, she states that she has only been able to go to PT 4 times due to scheduling conflicts so she cannot tell any improvement yet.  She has HEP.  She is taking Flexeril 10 mg prn, typically after PT.   She is not taking any pain meds.  Patient denies problems with her incision, no redness, swelling or drainage.    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    Patient had surgery 8.21.19 by Dr Madera, L4/5 laminectomy with posterior fusion and posterior pedicle screw fixation    Patient was last seen 10.9.19 for post op appt for residual low back and foot pain and numbness.  Patient states that she is still having constant low back pain that is mild and can be more severe when she does not wear her back brace.  She denies foot pain currently, but still has bilateral feet numbness.  She has had one instance of right lower extremity pain since she was seen last.  She denies leg weakness.    Back Pain   The problem occurs constantly. The problem has been gradually improving since onset. The pain is present in the lumbar spine. The pain does not radiate. The pain is mild. The symptoms are aggravated by standing. Associated symptoms include numbness. Pertinent negatives include no weakness.       The following portions of the patient's history were reviewed and updated as appropriate: allergies, current medications, past family history, past medical history, past social history, past surgical history and problem list.    Review of Systems   Musculoskeletal: Positive for back pain. Negative for arthralgias, gait problem and myalgias.   Neurological: Positive for numbness. Negative for weakness.   All other systems reviewed and are  "negative.      Objective     Vitals:    11/12/19 1228   Weight: 99.8 kg (220 lb)   Height: 172.7 cm (67.99\")     Body mass index is 33.46 kg/m².      Physical Exam   Constitutional: She is oriented to person, place, and time. She appears well-developed and well-nourished. No distress.   HENT:   Head: Normocephalic and atraumatic.   Pulmonary/Chest: Effort normal.   Musculoskeletal: She exhibits no edema.   Neurological: She is alert and oriented to person, place, and time. Gait normal.   Reflex Scores:       Patellar reflexes are 0 on the right side and 0 on the left side.       Achilles reflexes are 0 on the right side and 0 on the left side.  Skin: Skin is warm and dry.   Psychiatric: She has a normal mood and affect.     Neurologic Exam     Mental Status   Oriented to person, place, and time.     Sensory Exam   Light touch normal.     Gait, Coordination, and Reflexes     Gait  Gait: normal    Reflexes   Right patellar: 0  Left patellar: 0  Right achilles: 0  Left achilles: 0    Lumbar incision well-healed    Assessment/Plan   Independent Review of Radiographic Studies:      I personally reviewed the images from the following studies.    Lumbar Xray was reviewed and reveals satisfactory appearance of pedicle screws at L4 and L5 bilaterally with posterior rods and interbody device L4-5    Medical Decision Making:      She is doing very well from a surgical standpoint.  She has been able to ease back into her normal preop activities with no setbacks.  She did fall but did not suffer any injuries.  At this point she can come out of the brace and use it only as needed.  We will have her complete more therapy outside of the brace.  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  At this point we will see her back in 3 months with another x-ray.    Kristine was seen today for post-op, back pain and numbness.    Diagnoses and all orders for this visit:    Spinal stenosis, lumbar " region, with neurogenic claudication  -     Ambulatory Referral to Physical Therapy POST OP  -     XR Spine Lumbar 2 or 3 View; Future    Spondylolisthesis of lumbar region  -     Ambulatory Referral to Physical Therapy POST OP  -     XR Spine Lumbar 2 or 3 View; Future      Return in about 3 months (around 2/12/2020) for After radiographic tests.

## 2019-11-12 ENCOUNTER — HOSPITAL ENCOUNTER (OUTPATIENT)
Dept: GENERAL RADIOLOGY | Facility: HOSPITAL | Age: 59
Discharge: HOME | End: 2019-11-12
Admitting: NURSE PRACTITIONER

## 2019-11-12 ENCOUNTER — OFFICE VISIT (OUTPATIENT)
Dept: NEUROSURGERY | Facility: CLINIC | Age: 59
End: 2019-11-12

## 2019-11-12 VITALS — HEIGHT: 68 IN | BODY MASS INDEX: 33.34 KG/M2 | WEIGHT: 220 LBS

## 2019-11-12 DIAGNOSIS — M48.062 SPINAL STENOSIS, LUMBAR REGION, WITH NEUROGENIC CLAUDICATION: Primary | ICD-10-CM

## 2019-11-12 DIAGNOSIS — M43.16 SPONDYLOLISTHESIS OF LUMBAR REGION: ICD-10-CM

## 2019-11-12 DIAGNOSIS — M48.062 SPINAL STENOSIS, LUMBAR REGION, WITH NEUROGENIC CLAUDICATION: ICD-10-CM

## 2019-11-12 PROCEDURE — 72100 X-RAY EXAM L-S SPINE 2/3 VWS: CPT

## 2019-11-12 PROCEDURE — 99024 POSTOP FOLLOW-UP VISIT: CPT | Performed by: NURSE PRACTITIONER

## 2019-11-19 ENCOUNTER — APPOINTMENT (OUTPATIENT)
Dept: PHYSICAL THERAPY | Facility: HOSPITAL | Age: 59
End: 2019-11-19

## 2019-11-21 ENCOUNTER — HOSPITAL ENCOUNTER (OUTPATIENT)
Dept: PHYSICAL THERAPY | Facility: HOSPITAL | Age: 59
Setting detail: THERAPIES SERIES
Discharge: HOME | End: 2019-11-21

## 2019-11-21 DIAGNOSIS — M48.062 SPINAL STENOSIS, LUMBAR REGION, WITH NEUROGENIC CLAUDICATION: ICD-10-CM

## 2019-11-21 DIAGNOSIS — M43.16 SPONDYLOLISTHESIS OF LUMBAR REGION: Primary | ICD-10-CM

## 2019-11-21 PROCEDURE — 97110 THERAPEUTIC EXERCISES: CPT

## 2019-11-21 PROCEDURE — G0283 ELEC STIM OTHER THAN WOUND: HCPCS

## 2019-11-21 NOTE — THERAPY RE-EVALUATION
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), managed as type 1 (CMS/HCC)   • Hyperlipidemia   • Essential hypertension   • Post-menopausal   • Neck pain   • Rotator cuff tendonitis   • Seasonal allergic rhinitis   • Type 2 diabetes mellitus (CMS/HCC)   • Foot pain, right   • Neuropathy of foot   • Screen for colon cancer   • Family history of polyps in the colon   • Gastroesophageal reflux disease   • Spondylolisthesis of lumbar region   • Spinal stenosis, lumbar region, with neurogenic claudication   • Obesity (BMI 30-39.9)        Past Medical History:   Diagnosis Date   • AC (acromioclavicular) joint bone spurs    • Diabetes mellitus (CMS/HCC)    • GERD (gastroesophageal reflux disease)    • Herpes zoster    • Hyperlipidemia    • Hypertension    • Myocardial infarction (CMS/HCC)         Past Surgical History:   Procedure Laterality Date   • BACK SURGERY     • BREAST LUMPECTOMY     • BREAST LUMPECTOMY      for benign lump   • CARDIAC SURGERY  2014   •  SECTION     • CORONARY ARTERY BYPASS GRAFT     • D&C WITH SUCTION      times 3 -miscarriages   • KNEE SURGERY     • LUMBAR DISCECTOMY FUSION INSTRUMENTATION Bilateral 2019    Procedure: Lumbar Laminectomy Lumbar 4 Lumbar 5 with posterior interbody allograft fusion using carbon fiber interbody device and posterior pedicle titanium screw fixation;  Surgeon: Harshad Madera MD;  Location: [facility];  Service: Neurosurgery       Visit Dx:     ICD-10-CM ICD-9-CM   1. Spondylolisthesis of lumbar region M43.16 738.4   2. 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  \"I would like to go back to work in January but I don't see the doctor unit Feb.\"  \"I had to get up early to take my son to work and I forgot to put my back brace on.\" She reports her back just feels very weak without brace. \"My middle feels like jello.\"   -LN       Precautions and Contraindications    Precautions/Limitations  lifting restrictions (specify in comments)  -LN    Precautions  no more restrictions except listen to your body.  -LN       Subjective Pain    Able to rate subjective pain?  yes  -LN    Pre-Treatment Pain Level  2 c/o weakness more than pain  -LN       Head/Neck/Trunk    Trunk Extension AROM  25%- pain  -LN    Trunk Flexion AROM  50%- pain  -LN    Trunk Lt Lateral Flexion AROM  25%  -LN    Trunk Rt Lateral Flexion AROM  25%  -LN    Trunk Lt Rotation AROM  75%- discomfort  -LN    Trunk Rt Rotation AROM  WFL  -LN       MMT Neck/Trunk    Trunk Flexion MMT, Gross Movement  (3-/5) fair minus  -LN    Trunk Extension MMT, Gross Movement  (2+/5) poor plus  -LN    Neck/Trunk Comments   Patient needed assist to transfer supine to sit off treatment table secondary to weak abdominal muscles.   -LN       MMT Right Lower Ext    Rt Hip Flexion MMT, Gross Movement  (5/5) normal  -LN    Rt Hip Extension MMT, Gross Movement  (5/5) normal  -LN    Rt Hip ABduction MMT, Gross Movement  (5/5) normal  -LN    Rt Hip ADduction MMT, Gross Movement  (5/5) normal  -LN    Rt Knee Extension MMT, Gross Movement  (5/5) normal  -LN    Rt Knee Flexion MMT, Gross Movement  (5/5) normal  -LN    Rt Ankle Plantarflexion MMT, " Gross Movement  (5/5) normal  -LN    Rt Ankle Dorsiflexion MMT, Gross Movement  (5/5) normal  -LN       MMT Left Lower Ext    Lt Hip Flexion MMT, Gross Movement  (5/5) normal  -LN    Lt Hip Extension MMT, Gross Movement  (5/5) normal  -LN    Lt Hip ABduction MMT, Gross Movement  (5/5) normal  -LN    Lt Hip ADduction MMT, Gross Movement  (5/5) normal  -LN    Lt Knee Extension MMT, Gross Movement  (5/5) normal  -LN    Lt Knee Flexion MMT, Gross Movement  (5/5) normal  -LN    Lt Ankle Plantarflexion MMT, Gross Movement  (5/5) normal  -LN    Lt Ankle Dorsiflexion MMT, Gross Movement  (5/5) normal  -LN       Lower Extremity Flexibility    Hamstrings  Bilateral:;Moderately limited  -LN    Hip Adductors  Right:;Mildly limited  -LN    Gastrocnemius  Bilateral:;Mildly limited  -LN    Soleus  Bilateral:;Mildly limited  -LN       Transfers    Sit-Stand [place] (Transfers)  independent  -LN    Stand-Sit [place] (Transfers)  independent  -LN    Transfers, Sit-Stand-Sit, Assist Device  other (see comments) none- no back brace today.  -LN       Gait/Stairs Assessment/Training    [place] Level (Gait)  independent  -LN    Assistive Device (Gait)  other (see comments) none- no back brace today  -LN    Deviations/Abnormal Patterns (Gait)  antalgic;keith decreased;stride length decreased;base of support, wide  -LN      User Key  (r) = Recorded By, (t) = Taken By, (c) = Cosigned By    Initials Name Provider Type    Bhavna Sheets, PT Physical Therapist                      Therapy Education  Education Details: Patient to work on HEP 1 x day as tolerated and use MH/warm baths as needed at home.  Spoke to patient about continuing to wear brace when she is up and active [place] walking around farm/uneven ground but to not wear it when she is doing her exercises or just sitting or lying down and resting.   Given: HEP, Symptoms/condition management, Pain management, Posture/body mechanics  Program: New,  Reinforced(added LTR)  How Provided: Verbal, Demonstration, Written  Provided to: Patient  Level of Understanding: Teach back education performed, Verbalized, Demonstrated     PT OP Goals     Row Name 11/21/19 1100          PT Short Term Goals    STG Date to Achieve  12/05/19  -LN     STG 1  Patient to report decreased verbal rating of pain in LB to <3/10 with all ADLs & everday activities (with back brace on).   -LN     STG 1 Progress  Met  -LN     STG 1 Progress Comments  She rates pain at a 2/10; c/o weakness more than pain.  -LN     STG 2  Patient able to perform 10-15 reps gentle stretching and back/core stabilization exercises with back brace on with no c/o any increased back pain.   -LN     STG 2 Progress  Met  -LN     STG 2 Progress Comments  Began exercises without back brace on today.  -LN     STG 3  Patient to report improved standing tolerance (with back brace on ) to 10-15 minutes to allow her to do more cooking in the kitchen.   -LN     STG 3 Progress  Ongoing  -LN     STG 3 Progress Comments  still with decreased standing tolerance  -LN     STG 4  Patient able to walk 1/2 mile with no difficulty and no c/o any increased back pain.   -LN     STG 4 Progress  Ongoing  -LN     STG 4 Progress Comments  still with decreased walking tolerance  -LN     STG 5  Trunk ROM improved by 25% to allow for improved functional mobility and gait.  -LN     STG 5 Progress  New  -LN        Long Term Goals    LTG Date to Achieve  12/19/19  -LN     LTG 1  Patient to report decreased verbal rating of pain in LB to 0-1/10 with all ADLs & everday activities (with back brace on).   -LN     LTG 1 Progress  Ongoing;Progressing  -LN     LTG 2  Patient independent with HEP issued by therapist.   -LN     LTG 2 Progress  Ongoing  -LN     LTG 3  Patient to have improved core strength to 4/5.   -LN     LTG 3 Progress  Ongoing  -LN     LTG 3 Progress Comments  Core is still very weak.  -LN     LTG 4  Patient to have improved standing  tolerance to 30-45 minutes to allow her to cook a meal.   -LN     LTG 4 Progress  Ongoing  -LN     LTG 5  Patient able to walk 1 mile with no difficulty and no c/o any increased back pain.   -LN     LTG 5 Progress  Ongoing  -LN     LTG 6  Patient able to perform 15-20 reps of gentle stretching/ROM/core strengthening/stabilization exercises without back brace on without any difficulty or c/o increased back pain.   -LN     LTG 6 Progress  New  -LN     LTG 7  Trunk ROM WFL and painfree all planes.   -LN     LTG 7 Progress  New  -LN        Time Calculation    PT Goal Re-Cert Due Date  12/19/19  -LN       User Key  (r) = Recorded By, (t) = Taken By, (c) = Cosigned By    Initials Name Provider Type    Bhavna Sheets, PT Physical Therapist          PT Assessment/Plan     Row Name 11/21/19 1100          PT Assessment    Assessment Comments  Patient presents with minimal c/o LBP with minimal tenderness; c/o primarily back weakness. Patient with decreased trunk ROM, decreased flexibility bilateral LE, decreased core strength (abdominals and trunk extension), decreased standing and walking tolerance and guarded mobility and gait. 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  Patient will benefit from continued PT for stretching/ROM/core strengthening exercises/aerobic exercises to increase endurance and activity tolerance, and modalities for pain relief.  Patient has met 2 STG and she is making progress towards all goals.  Have set 1 new STG and 2 new LTG.   -LN        PT Plan    PT Frequency  2x/week  -LN     PT Plan Comments  Continue per P.O.C.  Progress exercises as tolerated (without back brace on). Try recumbent bicycle next visit as tolerated. MH/CP/IFC PRN. Patient education.  Wean patient out of back brace as her strength improves and as tolerated.  Check pelvic alignment next visit.  Continue towards all goals.   -LN      "  User Key  (r) = Recorded By, (t) = Taken By, (c) = Cosigned By    Initials Name Provider Type    Bhavna Sheets, PT Physical Therapist          Modalities     Row Name 11/21/19 1100             Moist Heat    MH Applied  Yes  -LN      Location  LB/LS area with IFC with patient supine in hooklying with HOB a little elevated.  -LN      Rx Minutes  15 mins  -LN      MH S/P Rx  Yes  -LN         ELECTRICAL STIMULATION    Attended/Unattended  Unattended  -LN      Stimulation Type  IFC  -LN      Location/Electrode Placement/Other  Bilateral LB/LS area with MH.  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  \"I would like to go back to work in January but I don't see the doctor unit Feb.\"  \"I had to get up early to take my son to work and I forgot to put my back brace on.\" She reports her back just feels very weak without brace. \"My middle feels like jello.\"   -LN         Subjective Pain    Able to rate subjective pain?  yes  -LN      Pre-Treatment Pain Level  2 c/o weakness more than pain  -LN         Exercise 1    Exercise Name 1  supine hamstring stretch with sheet- with HOB elevated  -LN      Cueing 1  Verbal;Tactile;Demo  -LN      Reps " "1  10 each leg  -LN      Time 1  10 sec  -LN         Exercise 2    Exercise Name 2  PPT  -LN      Cueing 2  Verbal;Tactile;Demo  -LN      Reps 2  10  -LN      Time 2  5 sec  -LN      Additional Comments  in hooklying and in \"painfree\" range  -LN         Exercise 3    Exercise Name 3  Isometric hip adduction with ball  -LN      Cueing 3  Verbal;Tactile  -LN      Reps 3  10  -LN      Time 3  5 sec  -LN         Exercise 4    Exercise Name 4  Hooklying hip abduction vs. TB  -LN      Cueing 4  Verbal;Tactile  -LN      Reps 4  10  -LN      Time 4  5 sec  -LN      Additional Comments  blue TB; 1 leg at a time.   -LN         Exercise 5    Exercise Name 5  GS  -LN      Cueing 5  Verbal;Demo  -LN      Reps 5  10  -LN      Time 5  5 sec  -LN      Additional Comments  with legs straight  -LN         Exercise 6    Exercise Name 6  seated hip flexion with stomach muscles tight  -LN      Cueing 6  Verbal;Demo  -LN      Reps 6  10 each leg  -LN      Additional Comments  with stomach muscles tight  -LN         Exercise 7    Exercise Name 7  seated LAQ with stomach muscles tight  -LN      Cueing 7  Verbal;Demo  -LN      Reps 7  10 each leg  -LN         Exercise 8    Exercise Name 8  --  -LN      Cueing 8  --  -LN      Reps 8  --  -LN         Exercise 9    Exercise Name 9  --  -LN      Cueing 9  --  -LN      Reps 9  --  -LN         Exercise 10    Exercise Name 10  --  -LN      Cueing 10  --  -LN      Reps 10  --  -LN         Exercise 11    Exercise Name 11  LTR  -LN      Cueing 11  Verbal;Tactile;Demo  -LN      Reps 11  5  -LN      Time 11  5 sec  -LN        User Key  (r) = Recorded By, (t) = Taken By, (c) = Cosigned By    Initials Name Provider Type    Bhavna Sheets, MANJU Physical Therapist                                  Time Calculation:     Start Time: 1110  Stop Time: 1215  Time Calculation (min): 65 min     Therapy Charges for Today     Code Description Service Date Service Provider Modifiers Qty    [id number]  PT " ELECTRICAL STIM UNATTENDED 11/21/2019 Bhavna Winter, PT  1    [id number]  PT THER PROC EA 15 MIN 11/21/2019 Bhavna Winter, PT GP 2                    Bhavna Winter, PT  11/21/2019

## 2019-11-26 ENCOUNTER — HOSPITAL ENCOUNTER (OUTPATIENT)
Dept: PHYSICAL THERAPY | Facility: HOSPITAL | Age: 59
Setting detail: THERAPIES SERIES
Discharge: HOME | End: 2019-11-26

## 2019-11-26 DIAGNOSIS — M48.062 SPINAL STENOSIS, LUMBAR REGION, WITH NEUROGENIC CLAUDICATION: ICD-10-CM

## 2019-11-26 DIAGNOSIS — M43.16 SPONDYLOLISTHESIS OF LUMBAR REGION: Primary | ICD-10-CM

## 2019-11-26 PROCEDURE — 97110 THERAPEUTIC EXERCISES: CPT

## 2019-11-26 PROCEDURE — G0283 ELEC STIM OTHER THAN WOUND: HCPCS

## 2019-11-26 NOTE — THERAPY TREATMENT NOTE
Outpatient Physical Therapy Ortho Treatment Note   [place]     Patient Name: Kristine Rivas  : 1960  MRN: [id number]  Today's Date: 2019      Visit Date: 2019    Visit Dx:    ICD-10-CM ICD-9-CM   1. Spondylolisthesis of lumbar region M43.16 738.4   2. 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), managed as type 1 (CMS/HCC)   • Hyperlipidemia   • Essential hypertension   • Post-menopausal   • Neck pain   • Rotator cuff tendonitis   • Seasonal allergic rhinitis   • Type 2 diabetes mellitus (CMS/HCC)   • Foot pain, right   • Neuropathy of foot   • Screen for colon cancer   • Family history of polyps in the colon   • Gastroesophageal reflux disease   • Spondylolisthesis of lumbar region   • Spinal stenosis, lumbar region, with neurogenic claudication   • Obesity (BMI 30-39.9)        Past Medical History:   Diagnosis Date   • AC (acromioclavicular) joint bone spurs    • Diabetes mellitus (CMS/HCC)    • GERD (gastroesophageal reflux disease)    • Herpes zoster    • Hyperlipidemia    • Hypertension    • Myocardial infarction (CMS/HCC)         Past Surgical History:   Procedure Laterality Date   • BACK SURGERY     • BREAST LUMPECTOMY     • BREAST LUMPECTOMY      for benign lump   • CARDIAC SURGERY  2014   •  SECTION     • CORONARY ARTERY BYPASS GRAFT     • D&C WITH SUCTION      times 3 -miscarriages   • KNEE SURGERY     • LUMBAR DISCECTOMY FUSION INSTRUMENTATION Bilateral 2019    Procedure: Lumbar Laminectomy Lumbar 4 Lumbar 5 with posterior interbody allograft fusion using carbon fiber interbody device and posterior pedicle titanium screw fixation;  Surgeon: Harshad Madera MD;  Location: [facility];  Service: Neurosurgery       PT Ortho     Row Name 19 1400       Precautions and Contraindications  "   Precautions/Limitations  lifting restrictions (specify in comments)  -LN    Precautions  no more restrictions except listen to your body.  -LN       Subjective Pain    Able to rate subjective pain?  yes  -LN    Pre-Treatment Pain Level  3 but with occas sharp pains  -LN    Subjective Pain Comment  in left side of low back  -LN      User Key  (r) = Recorded By, (t) = Taken By, (c) = Cosigned By    Initials Name Provider Type    Bhavna Sheets, PT Physical Therapist                      PT Assessment/Plan     Row Name 11/26/19 1400          PT Assessment    Assessment Comments  Patient with increased c/o left sided LBP with occas. sharp pains, so decreased the number of exercises for this week to see if this helps decrease her pain level. Feel the increase in pain is most likely muscular and may be secondary to her body getting used to being out of the brace for exercises.  She does report benefit from IFC and MH and is going to try using a home TENS for pain control at home.  Her mobility and gait are still very guarded.   -LN        PT Plan    PT Frequency  2x/week  -LN     PT Plan Comments  Continue per P.O.C.  Progress exercises as tolerated (without back brace on). Try recumbent bicycle next visit as tolerated. MH/CP/IFC PRN. Patient education.  Wean patient out of back brace as her strength improves and as tolerated.  Assess how patient is doing with decreased number of exercises.   -LN       User Key  (r) = Recorded By, (t) = Taken By, (c) = Cosigned By    Initials Name Provider Type    Bhavna Sheets, PT Physical Therapist          Modalities     Row Name 11/26/19 1400             Subjective Comments    Subjective Comments  Patient reports that she has been hurting more since the last visit in left side of LB and occasionally into right LB. No c/o any leg pain but reports occas. \"nerve\" pain in legs.  \"I have been trying to do my exercises but have had trouble because of the pain.\"  " "\"My sister says she has a home TENS unit that she has never used, can I use that.\"   -LN         Moist Heat    MH Applied  Yes  -LN      Location  LB/LS area with IFC with patient supine in hooklying with HOB a little elevated.  -LN      Rx Minutes  15 mins  -LN      MH S/P Rx  Yes  -LN         ELECTRICAL STIMULATION    Attended/Unattended  Unattended  -LN      Stimulation Type  IFC  -LN      Location/Electrode Placement/Other  Bilateral LB/LS area ; L>R with MH.  -LN       PT E-Stim Unattended (Manual) Minutes  15  -LN        User Key  (r) = Recorded By, (t) = Taken By, (c) = Cosigned By    Initials Name Provider Type    Bhavna Sheets, PT Physical Therapist        OP Exercises     Row Name 11/26/19 1400 11/26/19 1300          Precautions    Existing Precautions/Restrictions  brace worn when out of bed except for when in PT for exercise  -LN  --        Subjective Comments    Subjective Comments  Patient reports that she has been hurting more since the last visit in left side of LB and occasionally into right LB. No c/o any leg pain but reports occas. \"nerve\" pain in legs.  \"I have been trying to do my exercises but have had trouble because of the pain.\"  \"My sister says she has a home TENS unit that she has never used, can I use that.\"   -LN  --        Subjective Pain    Able to rate subjective pain?  yes  -LN  --     Pre-Treatment Pain Level  3 but with occas sharp pains  -LN  --     Subjective Pain Comment  in left side of low back  -LN  --        Total Minutes    51767 - PT Therapeutic Exercise Minutes  15  -LN  --     88337 - PT Manual Therapy Minutes  --  6  -LN        Exercise 1    Exercise Name 1  supine hamstring stretch with sheet- with HOB elevated  -LN  --     Cueing 1  Verbal;Tactile;Demo  -LN  --     Reps 1  10 each leg  -LN  --     Time 1  10 sec  -LN  --        Exercise 2    Exercise Name 2  PPT  -LN  --     Cueing 2  Verbal;Tactile;Demo  -LN  --     Reps 2  5  -LN  --     Time 2  5 " "sec  -LN  --     Additional Comments  in hooklying and in \"painfree\" range  -LN  --        Exercise 3    Exercise Name 3  Isometric hip adduction with ball  -LN  --     Cueing 3  Verbal;Tactile  -LN  --     Reps 3  10  -LN  --     Time 3  5 sec  -LN  --        Exercise 4    Exercise Name 4  --  -LN  --     Cueing 4  --  -LN  --     Reps 4  --  -LN  --     Time 4  --  -LN  --        Exercise 5    Exercise Name 5  GS  -LN  --     Cueing 5  Verbal;Demo  -LN  --     Reps 5  10  -LN  --     Time 5  5 sec  -LN  --     Additional Comments  with legs straight   -LN  --       User Key  (r) = Recorded By, (t) = Taken By, (c) = Cosigned By    Initials Name Provider Type    Bhavna Sheets, PT Physical Therapist                      Manual Rx (last 36 hours)      Manual Treatments     Row Name 11/26/19 1300             Total Minutes    86543 - PT Manual Therapy Minutes  6  -LN         Manual Rx 1    Manual Rx 1 Location  pelvis  -LN      Manual Rx 1 Type  MET- shotgun/left anterior innominate  -LN      Manual Rx 1 Duration  Good pelvic alignment noted after MET.   -LN        User Key  (r) = Recorded By, (t) = Taken By, (c) = Cosigned By    Initials Name Provider Type    Bhavna Sheets, PT Physical Therapist              Therapy Education  Education Details: Patient to work on HEP 1-2 x day and to only do the 4 exercises we reviewed today.  Patient to use MH/CP PRN and to use her sister's home TENS unit as needed for pain control. Instructed patient in where to apply electrodes and precautions, including not putting electrodes on spine and to not wear it all day because it will mask her pain and she may overdo it.   Given: HEP, Symptoms/condition management, Pain management, Posture/body mechanics  Program: Reinforced, Modified(Decreased # of exercises to 4 only.)  How Provided: Verbal, Demonstration, Written  Provided to: Patient  Level of Understanding: Teach back education performed, Verbalized, " Demonstrated              Time Calculation:   Start Time: 1400  Stop Time: 1500  Time Calculation (min): 60 min  Therapy Charges for Today     Code Description Service Date Service Provider Modifiers Qty    49944450729 HC PT ELECTRICAL STIM UNATTENDED 11/26/2019 Bhavna Winter, PT  1    [id number]  PT THER PROC EA 15 MIN 11/26/2019 Bhavna Winter, PT GP 1                    Bhavna Winter, PT  11/26/2019

## 2019-11-29 DIAGNOSIS — E13.9 LATENT AUTOIMMUNE DIABETES IN ADULTS (LADA), MANAGED AS TYPE 1 (HCC): ICD-10-CM

## 2019-11-29 DIAGNOSIS — E78.5 HYPERLIPIDEMIA, UNSPECIFIED HYPERLIPIDEMIA TYPE: ICD-10-CM

## 2019-11-29 DIAGNOSIS — I25.10 CHRONIC CORONARY ARTERY DISEASE: ICD-10-CM

## 2019-12-02 RX ORDER — ATORVASTATIN CALCIUM 40 MG/1
TABLET, FILM COATED ORAL
Qty: 30 TABLET | Refills: 0 | Status: SHIPPED | OUTPATIENT
Start: 2019-12-02 | End: 2019-12-30

## 2019-12-03 ENCOUNTER — HOSPITAL ENCOUNTER (OUTPATIENT)
Dept: PHYSICAL THERAPY | Facility: HOSPITAL | Age: 59
Setting detail: THERAPIES SERIES
Discharge: HOME | End: 2019-12-03

## 2019-12-03 DIAGNOSIS — M43.16 SPONDYLOLISTHESIS OF LUMBAR REGION: Primary | ICD-10-CM

## 2019-12-03 DIAGNOSIS — M48.062 SPINAL STENOSIS, LUMBAR REGION, WITH NEUROGENIC CLAUDICATION: ICD-10-CM

## 2019-12-03 PROCEDURE — 97110 THERAPEUTIC EXERCISES: CPT

## 2019-12-03 PROCEDURE — G0283 ELEC STIM OTHER THAN WOUND: HCPCS

## 2019-12-03 NOTE — THERAPY TREATMENT NOTE
Outpatient Physical Therapy Ortho Treatment Note   [place]     Patient Name: Kristine Rivas  : 1960  MRN: [id number]  Today's Date: 12/3/2019      Visit Date: 2019    Visit Dx:    ICD-10-CM ICD-9-CM   1. Spondylolisthesis of lumbar region M43.16 738.4   2. 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), managed as type 1 (CMS/HCC)   • Hyperlipidemia   • Essential hypertension   • Post-menopausal   • Neck pain   • Rotator cuff tendonitis   • Seasonal allergic rhinitis   • Type 2 diabetes mellitus (CMS/HCC)   • Foot pain, right   • Neuropathy of foot   • Screen for colon cancer   • Family history of polyps in the colon   • Gastroesophageal reflux disease   • Spondylolisthesis of lumbar region   • Spinal stenosis, lumbar region, with neurogenic claudication   • Obesity (BMI 30-39.9)        Past Medical History:   Diagnosis Date   • AC (acromioclavicular) joint bone spurs    • Diabetes mellitus (CMS/HCC)    • GERD (gastroesophageal reflux disease)    • Herpes zoster    • Hyperlipidemia    • Hypertension    • Myocardial infarction (CMS/HCC)         Past Surgical History:   Procedure Laterality Date   • BACK SURGERY     • BREAST LUMPECTOMY     • BREAST LUMPECTOMY      for benign lump   • CARDIAC SURGERY  2014   •  SECTION     • CORONARY ARTERY BYPASS GRAFT     • D&C WITH SUCTION      times 3 -miscarriages   • KNEE SURGERY     • LUMBAR DISCECTOMY FUSION INSTRUMENTATION Bilateral 2019    Procedure: Lumbar Laminectomy Lumbar 4 Lumbar 5 with posterior interbody allograft fusion using carbon fiber interbody device and posterior pedicle titanium screw fixation;  Surgeon: Harshad Madera MD;  Location: [facility];  Service: Neurosurgery       PT Ortho     Row Name 19 1300       Subjective Comments    Subjective  "Comments  Patient reports she is better than last visit- \"I try to go without the brace if I am just sitting and relaxing but with any sign of increased pain, I put the brace back on.\" \"I just can't go completely without it yet.\"  \"My sister is supposed to let me borrow a home TENS unit but I haven't gotten it yet.\" \"The worst is when I am standing in 1 place like cooking or doing dishes.\"   -LN       Precautions and Contraindications    Precautions/Limitations  lifting restrictions (specify in comments)  -LN    Precautions  no more restrictions except listen to your body per patient  -LN       Subjective Pain    Able to rate subjective pain?  yes  -LN    Pre-Treatment Pain Level  3 3-4/10  -LN      User Key  (r) = Recorded By, (t) = Taken By, (c) = Cosigned By    Initials Name Provider Type    Bhavna Sheets, PT Physical Therapist                      PT Assessment/Plan     Row Name 12/03/19 1400          PT Assessment    Assessment Comments  Patient with decreased LBP overall but still persists primarily left side. Good tolerance to decreased number of exercises. She is doing well with walking at home and on her property with brace on. She still needs to wear brace with increased activity at home.   -LN        PT Plan    PT Frequency  2x/week  -LN     PT Plan Comments  Continue per P.O.C.  Progress exercises as tolerated (without back brace on). Try recumbent bicycle next visit as tolerated. MH/CP/IFC PRN. Patient education.  Wean patient out of back brace as her strength improves and as tolerated.  Assess how patient is doing with decreased number of exercises.   -LN       User Key  (r) = Recorded By, (t) = Taken By, (c) = Cosigned By    Initials Name Provider Type    Bhavna Sheets, PT Physical Therapist          Modalities     Row Name 12/03/19 1300             Moist Heat    MH Applied  Yes  -LN      Location  LB/LS area with IFC with patient supine in hooklying with HOB a little " "elevated.  -LN      Rx Minutes  15 mins  -LN      MH S/P Rx  Yes  -LN         ELECTRICAL STIMULATION    Attended/Unattended  Unattended  -LN      Stimulation Type  IFC  -LN      Location/Electrode Placement/Other  Bilateral LB/LS area ; L>R with MH.  -LN       PT E-Stim Unattended (Manual) Minutes  15  -LN        User Key  (r) = Recorded By, (t) = Taken By, (c) = Cosigned By    Initials Name Provider Type    LN Bhavna Winter, PT Physical Therapist        OP Exercises     Row Name 12/03/19 1300             Precautions    Existing Precautions/Restrictions  brace worn when out of bed except for when in PT and with exercises  -LN         Subjective Comments    Subjective Comments  Patient reports she is better than last visit- \"I tried to go without the brace if I was just sititng and relaxing but with any sign of increased pain, I put the brace back on.\" \"I just can't go completely without it yet.\"  \"My sister is supposed to let me borrow a home TENS unit but I haven't gotten it yet.\"   -LN         Subjective Pain    Able to rate subjective pain?  yes  -LN      Pre-Treatment Pain Level  3 3-4/10  -LN      Subjective Pain Comment  still with left sided LBP but \"it has eased down some.\"   -LN         Total Minutes    74488 - PT Therapeutic Exercise Minutes  15  -LN      17680 - PT Manual Therapy Minutes  4  -LN         Exercise 1    Exercise Name 1  supine hamstring stretch with sheet- with HOB elevated  -LN      Cueing 1  Verbal;Tactile;Demo  -LN      Reps 1  10 each leg  -LN      Time 1  10 sec  -LN         Exercise 2    Exercise Name 2  PPT  -LN      Cueing 2  Verbal;Tactile;Demo  -LN      Reps 2  5  -LN      Time 2  5 sec  -LN      Additional Comments  in hooklying and in \"painfree\" range  -LN         Exercise 3    Exercise Name 3  Isometric hip adduction with ball  -LN      Cueing 3  Verbal;Tactile  -LN      Reps 3  10  -LN      Time 3  5 sec  -LN         Exercise 5    Exercise Name 5  GS  -LN      " Cueing 5  Verbal;Demo  -LN      Reps 5  10  -LN      Time 5  5 sec  -LN      Additional Comments  no more restrictions except listen to your body.  -LN         Exercise 8    Exercise Name 8  PPT with march  -LN      Cueing 8  Verbal;Tactile;Demo  -LN      Reps 8  10  -LN         Exercise 9    Exercise Name 9  SLR with PPT  -LN      Cueing 9  Verbal;Demo  -LN      Reps 9  5 each  -LN        User Key  (r) = Recorded By, (t) = Taken By, (c) = Cosigned By    Initials Name Provider Type    Bhavna Sheets, PT Physical Therapist                      Manual Rx (last 36 hours)      Manual Treatments     Row Name 12/03/19 1300             Total Minutes    98212 - PT Manual Therapy Minutes  4  -LN         Manual Rx 1    Manual Rx 1 Location  pelvis  -LN      Manual Rx 1 Type  MET- shotgun only needed  -LN      Manual Rx 1 Duration  Good pelvic alignment noted today.   -LN        User Key  (r) = Recorded By, (t) = Taken By, (c) = Cosigned By    Initials Name Provider Type    Bhavna Sheets, PT Physical Therapist              Therapy Education  Education Details: Patient to continue to work on HEP 1-2 x day and to use MH/CP PRN. She is to try using a home TENS unit as needed for pain control. (Her sister has one).   Spoke to patient about trying to tighten stomach muscles when she is doing standing activities to decrease stress on her low back and to try opening the cabinet under sink and resting 1 foot on bottom of cabinet and switch feet occasionally to see if this also helps decrease low back pressure/pain.   Given: HEP, Symptoms/condition management, Pain management, Posture/body mechanics  Program: New, Reinforced(added PPT with march and SLR with PPT.)  How Provided: Verbal, Demonstration, Written  Provided to: Patient  Level of Understanding: Teach back education performed, Verbalized, Demonstrated              Time Calculation:   Start Time: 1348  Stop Time: 1430  Time Calculation (min): 42  min  Therapy Charges for Today     Code Description Service Date Service Provider Modifiers Qty    79528710197 HC PT ELECTRICAL STIM UNATTENDED 12/3/2019 Bhavna Winter, PT  1    [id number] HC PT THER PROC EA 15 MIN 12/3/2019 Bhavna Winter, PT GP 1                    Bhavna Winter, PT  12/3/2019

## 2019-12-06 ENCOUNTER — HOSPITAL ENCOUNTER (OUTPATIENT)
Dept: PHYSICAL THERAPY | Facility: HOSPITAL | Age: 59
Setting detail: THERAPIES SERIES
Discharge: HOME | End: 2019-12-06

## 2019-12-06 DIAGNOSIS — M43.16 SPONDYLOLISTHESIS OF LUMBAR REGION: Primary | ICD-10-CM

## 2019-12-06 DIAGNOSIS — M48.062 SPINAL STENOSIS, LUMBAR REGION, WITH NEUROGENIC CLAUDICATION: ICD-10-CM

## 2019-12-06 PROCEDURE — 97110 THERAPEUTIC EXERCISES: CPT

## 2019-12-06 NOTE — THERAPY TREATMENT NOTE
Outpatient Physical Therapy Ortho Treatment Note   [place]     Patient Name: Kristine Rivas  : 1960  MRN: [id number]  Today's Date: 2019      Visit Date: 2019    Visit Dx:    ICD-10-CM ICD-9-CM   1. Spondylolisthesis of lumbar region M43.16 738.4   2. 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), managed as type 1 (CMS/HCC)   • Hyperlipidemia   • Essential hypertension   • Post-menopausal   • Neck pain   • Rotator cuff tendonitis   • Seasonal allergic rhinitis   • Type 2 diabetes mellitus (CMS/HCC)   • Foot pain, right   • Neuropathy of foot   • Screen for colon cancer   • Family history of polyps in the colon   • Gastroesophageal reflux disease   • Spondylolisthesis of lumbar region   • Spinal stenosis, lumbar region, with neurogenic claudication   • Obesity (BMI 30-39.9)        Past Medical History:   Diagnosis Date   • AC (acromioclavicular) joint bone spurs    • Diabetes mellitus (CMS/HCC)    • GERD (gastroesophageal reflux disease)    • Herpes zoster    • Hyperlipidemia    • Hypertension    • Myocardial infarction (CMS/HCC)         Past Surgical History:   Procedure Laterality Date   • BACK SURGERY     • BREAST LUMPECTOMY     • BREAST LUMPECTOMY      for benign lump   • CARDIAC SURGERY  2014   •  SECTION     • CORONARY ARTERY BYPASS GRAFT     • D&C WITH SUCTION      times 3 -miscarriages   • KNEE SURGERY     • LUMBAR DISCECTOMY FUSION INSTRUMENTATION Bilateral 2019    Procedure: Lumbar Laminectomy Lumbar 4 Lumbar 5 with posterior interbody allograft fusion using carbon fiber interbody device and posterior pedicle titanium screw fixation;  Surgeon: Harshad Madera MD;  Location: [facility] OR;  Service: Neurosurgery       PT Ortho     Row Name 19 1300       Subjective Comments    Subjective  "Comments  Patient reports that she went 2 days without much pain at all but started to have some soreness last night and \"I felt pretty good when I first woke up this morning but as the day has gone on, my left side is getting sore.\" She reports that \"it is so hard to lift my left leg but the right leg is no problem.\"   \"It is driving me crazy.\"   -LN       Precautions and Contraindications    Precautions/Limitations  lifting restrictions (specify in comments)  -LN    Precautions  no more restrictions except listen to your body per patient  -LN       Subjective Pain    Able to rate subjective pain?  yes  -LN    Pre-Treatment Pain Level  2 high 2 to low 3 per patient  -LN    Subjective Pain Comment  primarily left side  -LN      User Key  (r) = Recorded By, (t) = Taken By, (c) = Cosigned By    Initials Name Provider Type    Bhavna Sheets, PT Physical Therapist                      PT Assessment/Plan     Row Name 12/06/19 1600          PT Assessment    Assessment Comments  Patient still with c/o left sided LBP but overall decreased pain level and beginning to have more days without much pain.  Left leg is weaker than right with exercises; which may be related to nerve issues/impingement.  She is gradually beginining to tolerate more exercises.   -LN        PT Plan    PT Frequency  2x/week  -LN     PT Plan Comments  Continue per P.O.C.  Progress exercises as tolerated (without back brace on). Try recumbent bicycle next visit as tolerated. MH/CP/IFC PRN. Patient education.  Wean patient out of back brace as her strength improves and as tolerated.   -LN       User Key  (r) = Recorded By, (t) = Taken By, (c) = Cosigned By    Initials Name Provider Type    Bhavna Sheets, PT Physical Therapist          Modalities     Row Name 12/06/19 1300             Moist Heat    MH Applied  --  -LN      Location  --  -LN      Rx Minutes  --  -LN      MH S/P Rx  --  -LN      Patient denies application of MH  Yes " "no IFC/MH done today secondary to patient having to leave  -LN         ELECTRICAL STIMULATION    Attended/Unattended  --  -LN      Stimulation Type  --  -LN      Location/Electrode Placement/Other  --  -LN       PT E-Stim Unattended (Manual) Minutes  --  -LN        User Key  (r) = Recorded By, (t) = Taken By, (c) = Cosigned By    Initials Name Provider Type    LN Bhavna Winter, PT Physical Therapist        OP Exercises     Row Name 12/06/19 1300             Precautions    Existing Precautions/Restrictions  brace worn when out of bed  -LN         Subjective Comments    Subjective Comments  Patient reports that she went 2 days without much pain at all but started to have some soreness last night and \"I felt pretty good when I first woke up this morning but as the day has gone on, my left side is getting sore.\" She reports that \"it is so hard to lift my left leg but the right leg is no problem.\"   \"It is driving me crazy.\"   -LN         Subjective Pain    Able to rate subjective pain?  yes  -LN      Pre-Treatment Pain Level  2 high 2 to low 3 per patient  -LN      Subjective Pain Comment  primarily left side  -LN         Total Minutes    29274 - PT Therapeutic Exercise Minutes  15  -LN         Exercise 1    Exercise Name 1  supine hamstring stretch with sheet- with HOB elevated  -LN      Cueing 1  Verbal;Tactile;Demo  -LN      Reps 1  10 each leg  -LN      Time 1  10 sec  -LN         Exercise 2    Exercise Name 2  PPT  -LN      Cueing 2  Verbal;Tactile;Demo  -LN      Reps 2  10  -LN      Time 2  5 sec  -LN      Additional Comments  in hooklying and in \"painfree\" range  -LN         Exercise 3    Exercise Name 3  Isometric hip adduction with ball  -LN      Cueing 3  Verbal;Tactile  -LN      Reps 3  10  -LN      Time 3  5 sec  -LN         Exercise 4    Exercise Name 4  Hooklying hip abduction vs. TB  -LN      Cueing 4  Verbal;Tactile  -LN      Reps 4  10 each leg  -LN      Time 4  5 sec  -LN      Additional " Comments  green TB; 1 leg at a time.  -LN         Exercise 5    Exercise Name 5  GS  -LN      Cueing 5  Verbal;Demo  -LN      Reps 5  10  -LN      Time 5  5 sec  -LN      Additional Comments  with legs straight  -LN         Exercise 8    Exercise Name 8  PPT with march  -LN      Cueing 8  Verbal;Tactile;Demo  -LN      Reps 8  10  -LN         Exercise 9    Exercise Name 9  SLR with PPT  -LN      Cueing 9  Verbal;Demo  -LN      Reps 9  10 right; 6 left  -LN        User Key  (r) = Recorded By, (t) = Taken By, (c) = Cosigned By    Initials Name Provider Type    Bhavna Sheets, PT Physical Therapist                      Manual Rx (last 36 hours)      Manual Treatments     Row Name 12/06/19 1300             Manual Rx 1    Manual Rx 1 Location  pelvis  -LN      Manual Rx 1 Type  MET- shotgun/left pelvic outflare.  -LN      Manual Rx 1 Duration  Good pelvic alignment noted today.   -LN        User Key  (r) = Recorded By, (t) = Taken By, (c) = Cosigned By    Initials Name Provider Type    Bhavna Sheets, PT Physical Therapist                             Time Calculation:   Start Time: 1400  Stop Time: 1425  Time Calculation (min): 25 min  Therapy Charges for Today     Code Description Service Date Service Provider Modifiers Qty    25985340046 HC PT THER PROC EA 15 MIN 12/6/2019 Bhavna Winter, PT GP 1                    Bhavna Winter, PT  12/6/2019

## 2019-12-10 ENCOUNTER — APPOINTMENT (OUTPATIENT)
Dept: PHYSICAL THERAPY | Facility: HOSPITAL | Age: 59
End: 2019-12-10

## 2019-12-12 ENCOUNTER — DOCUMENTATION (OUTPATIENT)
Dept: PHYSICAL THERAPY | Facility: HOSPITAL | Age: 59
End: 2019-12-12

## 2019-12-19 ENCOUNTER — HOSPITAL ENCOUNTER (OUTPATIENT)
Dept: PHYSICAL THERAPY | Facility: HOSPITAL | Age: 59
Setting detail: THERAPIES SERIES
Discharge: HOME | End: 2019-12-19

## 2019-12-19 DIAGNOSIS — M48.062 SPINAL STENOSIS, LUMBAR REGION, WITH NEUROGENIC CLAUDICATION: ICD-10-CM

## 2019-12-19 DIAGNOSIS — M43.16 SPONDYLOLISTHESIS OF LUMBAR REGION: Primary | ICD-10-CM

## 2019-12-19 PROCEDURE — 97110 THERAPEUTIC EXERCISES: CPT

## 2019-12-19 PROCEDURE — G0283 ELEC STIM OTHER THAN WOUND: HCPCS

## 2019-12-19 NOTE — THERAPY TREATMENT NOTE
Outpatient Physical Therapy Ortho Treatment Note   [place]     Patient Name: Kristine Rivas  : 1960  MRN: [id number]  Today's Date: 2019      Visit Date: 2019    Visit Dx:    ICD-10-CM ICD-9-CM   1. Spondylolisthesis of lumbar region M43.16 738.4   2. 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), managed as type 1 (CMS/HCC)   • Hyperlipidemia   • Essential hypertension   • Post-menopausal   • Neck pain   • Rotator cuff tendonitis   • Seasonal allergic rhinitis   • Type 2 diabetes mellitus (CMS/HCC)   • Foot pain, right   • Neuropathy of foot   • Screen for colon cancer   • Family history of polyps in the colon   • Gastroesophageal reflux disease   • Spondylolisthesis of lumbar region   • Spinal stenosis, lumbar region, with neurogenic claudication   • Obesity (BMI 30-39.9)        Past Medical History:   Diagnosis Date   • AC (acromioclavicular) joint bone spurs    • Diabetes mellitus (CMS/HCC)    • GERD (gastroesophageal reflux disease)    • Herpes zoster    • Hyperlipidemia    • Hypertension    • Myocardial infarction (CMS/HCC)         Past Surgical History:   Procedure Laterality Date   • BACK SURGERY     • BREAST LUMPECTOMY     • BREAST LUMPECTOMY      for benign lump   • CARDIAC SURGERY  2014   •  SECTION     • CORONARY ARTERY BYPASS GRAFT     • D&C WITH SUCTION      times 3 -miscarriages   • KNEE SURGERY     • LUMBAR DISCECTOMY FUSION INSTRUMENTATION Bilateral 2019    Procedure: Lumbar Laminectomy Lumbar 4 Lumbar 5 with posterior interbody allograft fusion using carbon fiber interbody device and posterior pedicle titanium screw fixation;  Surgeon: Harshad Madera MD;  Location: [facility] OR;  Service: Neurosurgery       PT Ortho     Row Name 19 0900       Subjective Comments    Subjective  "Comments  \"I have good days and bad days.\"  Patient had to take her sister to the hospital several times last week so she had to do a lot of sitting. \"I wasn't able to do my exercises much last week.\"   -LN       Precautions and Contraindications    Precautions  no more restrictions except listen to your body per patient  -LN       Subjective Pain    Able to rate subjective pain?  yes  -LN    Pre-Treatment Pain Level  2 \"medium 2\"   -LN    Subjective Pain Comment  central LB primarily  -LN      User Key  (r) = Recorded By, (t) = Taken By, (c) = Cosigned By    Initials Name Provider Type    Bhavna Sheets, PT Physical Therapist                      PT Assessment/Plan     Row Name 12/19/19 0900          PT Assessment    Assessment Comments  Patient with overall  decreased pain in LB and now more central.  She is tolerating exercises fairly well.  Left leg is still weak and she is still having difficulty with SLR on left; has weakness in Quads and hip flexor.   -LN        PT Plan    PT Frequency  2x/week  -LN     PT Plan Comments  Continue per P.O.C.  Progress exercises as tolerated (without back brace on). Try recumbent bicycle next visit as tolerated. MH/CP/IFC PRN. Patient education.  Wean patient out of back brace as her strength improves and as tolerated.   -LN       User Key  (r) = Recorded By, (t) = Taken By, (c) = Cosigned By    Initials Name Provider Type    Bhavna Sheets, PT Physical Therapist          Modalities     Row Name 12/19/19 0900             Moist Heat    MH Applied  Yes  -LN      Location  LB/LS area with IFC with patient supine in hooklying with HOB a little elevated.  -LN      Rx Minutes  15 mins  -LN       S/P Rx  Yes  -LN         ELECTRICAL STIMULATION    Attended/Unattended  Unattended  -LN      Stimulation Type  IFC  -LN      Location/Electrode Placement/Other  Bilateral LB/LS area ; L>R with .  -LN       PT E-Stim Unattended (Manual) Minutes  15  -LN      " "  User Key  (r) = Recorded By, (t) = Taken By, (c) = Cosigned By    Initials Name Provider Type    Bhavna Sheets, PT Physical Therapist        OP Exercises     Row Name 12/19/19 0900             Subjective Comments    Subjective Comments  \"I have good days and bad days.\"  Patient had to take her sister to the hospital several times last week so she had to do a lot of sitting. \"I wasn't able to do my exercises much last week.\"   -LN         Subjective Pain    Able to rate subjective pain?  yes  -LN      Pre-Treatment Pain Level  2 \"medium 2\"   -LN      Subjective Pain Comment  central LB primarily  -LN         Total Minutes    48089 - PT Therapeutic Exercise Minutes  22  -LN      00650 - PT Manual Therapy Minutes  5  -LN         Exercise 1    Exercise Name 1  supine hamstring stretch with sheet- with HOB elevated  -LN      Cueing 1  Verbal;Tactile;Demo  -LN      Reps 1  10 each leg  -LN      Time 1  10 sec  -LN         Exercise 2    Exercise Name 2  PPT  -LN      Cueing 2  Verbal;Tactile;Demo  -LN      Reps 2  10  -LN      Time 2  5 sec  -LN      Additional Comments  in hooklying and in \"painfree\" range  -LN         Exercise 3    Exercise Name 3  Isometric hip adduction with ball  -LN      Cueing 3  Verbal;Tactile  -LN      Reps 3  10  -LN      Time 3  5 sec  -LN         Exercise 4    Exercise Name 4  Hooklying hip abduction vs. TB  -LN      Cueing 4  Verbal;Tactile  -LN      Reps 4  10 each leg  -LN      Time 4  5 sec  -LN      Additional Comments  blue TB; 1 leg at a time.  -LN         Exercise 5    Exercise Name 5  GS  -LN      Cueing 5  Verbal;Demo  -LN      Reps 5  10  -LN      Time 5  5 sec  -LN      Additional Comments  with legs straight  -LN         Exercise 6    Exercise Name 6  standed hip extension  -LN      Cueing 6  Verbal  -LN      Reps 6  10 each leg  -LN      Additional Comments  with patient standing upright; with stomach muscles tight  -LN         Exercise 7    Exercise Name 7  QS over " towel roll  -LN      Cueing 7  Verbal;Tactile  -LN      Reps 7  10 left leg  -LN      Time 7  5 sec  -LN         Exercise 8    Exercise Name 8  PPT with march  -LN      Cueing 8  Verbal;Tactile;Demo  -LN      Reps 8  10  -LN         Exercise 9    Exercise Name 9  SLR with PPT  -LN      Cueing 9  Verbal;Demo  -LN      Reps 9  10 right; 10 left  -LN        User Key  (r) = Recorded By, (t) = Taken By, (c) = Cosigned By    Initials Name Provider Type    Bhavna Sheets, PT Physical Therapist                      Manual Rx (last 36 hours)      Manual Treatments     Row Name 12/19/19 0900             Total Minutes    85652 - PT Manual Therapy Minutes  5  -LN         Manual Rx 1    Manual Rx 1 Location  pelvis  -LN      Manual Rx 1 Type  MET- shotgun/left anterior innominate  -LN      Manual Rx 1 Duration  Good pelvic alignment noted today after MET.  -LN        User Key  (r) = Recorded By, (t) = Taken By, (c) = Cosigned By    Initials Name Provider Type    Bhavna Sheets, PT Physical Therapist              Therapy Education  Education Details: Spoke to patient about trying to get some kind of platform step to use in the kitchen when she is cooking to decrease the amount of stooping she has to do and decrease the strain on her low back with cooking.  She is to continue with HEP 1- x day and use MH/CP PRN.   Given: HEP, Symptoms/condition management, Pain management, Posture/body mechanics  Program: New, Reinforced, Progressed(added standing hip extension at counter)  How Provided: Verbal, Demonstration  Provided to: Patient  Level of Understanding: Teach back education performed, Verbalized, Demonstrated              Time Calculation:   Start Time: 0900  Stop Time: 0955  Time Calculation (min): 55 min  Therapy Charges for Today     Code Description Service Date Service Provider Modifiers Qty    52545436837  PT ELECTRICAL STIM UNATTENDED 12/19/2019 Bhavna Winter, PT  1    [id number]  PT  THER PROC EA 15 MIN 12/19/2019 Bhavna Winter, PT GP 2                    Bhavna Winter, PT  12/19/2019

## 2019-12-27 ENCOUNTER — HOSPITAL ENCOUNTER (OUTPATIENT)
Dept: PHYSICAL THERAPY | Facility: HOSPITAL | Age: 59
Setting detail: THERAPIES SERIES
Discharge: HOME | End: 2019-12-27

## 2019-12-27 DIAGNOSIS — I10 ESSENTIAL HYPERTENSION: ICD-10-CM

## 2019-12-27 DIAGNOSIS — E13.9 LATENT AUTOIMMUNE DIABETES IN ADULTS (LADA), MANAGED AS TYPE 1 (HCC): ICD-10-CM

## 2019-12-27 DIAGNOSIS — E78.5 HYPERLIPIDEMIA, UNSPECIFIED HYPERLIPIDEMIA TYPE: ICD-10-CM

## 2019-12-27 DIAGNOSIS — M48.062 SPINAL STENOSIS, LUMBAR REGION, WITH NEUROGENIC CLAUDICATION: ICD-10-CM

## 2019-12-27 DIAGNOSIS — I25.10 CHRONIC CORONARY ARTERY DISEASE: ICD-10-CM

## 2019-12-27 DIAGNOSIS — M43.16 SPONDYLOLISTHESIS OF LUMBAR REGION: Primary | ICD-10-CM

## 2019-12-27 PROCEDURE — G0283 ELEC STIM OTHER THAN WOUND: HCPCS

## 2019-12-27 PROCEDURE — 97110 THERAPEUTIC EXERCISES: CPT

## 2019-12-27 NOTE — THERAPY TREATMENT NOTE
Outpatient Physical Therapy Ortho Treatment Note   [place]     Patient Name: Kristine Rivas  : 1960  MRN: [id number]  Today's Date: 2019      Visit Date: 2019    Visit Dx:    ICD-10-CM ICD-9-CM   1. Spondylolisthesis of lumbar region M43.16 738.4   2. 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), managed as type 1 (CMS/HCC)   • Hyperlipidemia   • Essential hypertension   • Post-menopausal   • Neck pain   • Rotator cuff tendonitis   • Seasonal allergic rhinitis   • Type 2 diabetes mellitus (CMS/HCC)   • Foot pain, right   • Neuropathy of foot   • Screen for colon cancer   • Family history of polyps in the colon   • Gastroesophageal reflux disease   • Spondylolisthesis of lumbar region   • Spinal stenosis, lumbar region, with neurogenic claudication   • Obesity (BMI 30-39.9)        Past Medical History:   Diagnosis Date   • AC (acromioclavicular) joint bone spurs    • Diabetes mellitus (CMS/HCC)    • GERD (gastroesophageal reflux disease)    • Herpes zoster    • Hyperlipidemia    • Hypertension    • Myocardial infarction (CMS/HCC)         Past Surgical History:   Procedure Laterality Date   • BACK SURGERY     • BREAST LUMPECTOMY     • BREAST LUMPECTOMY      for benign lump   • CARDIAC SURGERY  2014   •  SECTION     • CORONARY ARTERY BYPASS GRAFT     • D&C WITH SUCTION      times 3 -miscarriages   • KNEE SURGERY     • LUMBAR DISCECTOMY FUSION INSTRUMENTATION Bilateral 2019    Procedure: Lumbar Laminectomy Lumbar 4 Lumbar 5 with posterior interbody allograft fusion using carbon fiber interbody device and posterior pedicle titanium screw fixation;  Surgeon: Harshad Madera MD;  Location: [facility] OR;  Service: Neurosurgery       PT Ortho     Row Name 19 1300       Subjective Comments    Subjective  "Comments  pt reports her back is more sore today - did a lot of cooking for the holiday. \"im the only cook in the house right now.\"  Pt states she tried to modify her posture but found herself standing in forward flexed posture  -       Subjective Pain    Able to rate subjective pain?  yes  -    Pre-Treatment Pain Level  5  -    Post-Treatment Pain Level  2  -    Subjective Pain Comment  states her pain was \"up to a 10 at times\" over the week  -      User Key  (r) = Recorded By, (t) = Taken By, (c) = Cosigned By    Initials Name Provider Type     Kenji Romeo PTA Physical Therapy Assistant                      PT Assessment/Plan     Row Name 12/27/19 1610          PT Assessment    Assessment Comments  increased LBP due to increased standing/cooking this past week; fair to good tolerance to ex's with occasional cues to keep within painfree range - held on adding recumbent due to pt's initial  reports of increased pain; decreased symptoms reported following therapy  -        PT Plan    PT Plan Comments  Cont per POC, adding recumbent bike as tolerated  -       User Key  (r) = Recorded By, (t) = Taken By, (c) = Cosigned By    Initials Name Provider Type     Kenji Romeo PTA Physical Therapy Assistant          Modalities     Row Name 12/27/19 1300             Moist Heat    Location  LB/LS area with IFC with patient supine in hooklying with HOB a little elevated.  -      Rx Minutes  15 mins  -[facility] Prior to Rx  Yes  -         ELECTRICAL STIMULATION    Attended/Unattended  Unattended  -      Stimulation Type  IFC  -      Location/Electrode Placement/Other  Bilateral LB/LS area pre Rx with .  -       PT E-Stim Unattended (Manual) Minutes  15  -        User Key  (r) = Recorded By, (t) = Taken By, (c) = Cosigned By    Initials Name Provider Type     Kenji Romeo PTA Physical Therapy Assistant        OP Exercises     Row Name 12/27/19 1300             Subjective Comments " "   Subjective Comments  pt reports her back is more sore today - did a lot of cooking for the holiday. \"im the only cook in the house right now.\"  Pt states she tried to modify her posture but found herself standing in forward flexed posture  -         Subjective Pain    Able to rate subjective pain?  yes  -      Pre-Treatment Pain Level  5  -      Post-Treatment Pain Level  2  -      Subjective Pain Comment  states her pain was \"up to a 10 at times\" over the week  -         Exercise 1    Exercise Name 1  supine hamstring stretch with sheet- with HOB elevated  -      Cueing 1  Verbal;Tactile  -      Reps 1  10 each leg  -      Time 1  10 sec  -         Exercise 2    Exercise Name 2  PPT  -      Cueing 2  Verbal;Tactile  -      Reps 2  10  -      Time 2  5 sec  -      Additional Comments  in hooklying and in \"painfree\" range  -         Exercise 3    Exercise Name 3  Isometric hip adduction with ball  -      Cueing 3  Verbal;Tactile  -      Reps 3  10  -      Time 3  5 sec  -         Exercise 4    Exercise Name 4  Hooklying hip abduction vs. TB  -      Cueing 4  Verbal;Tactile  -      Reps 4  10 each leg  -      Time 4  5 sec  -      Additional Comments  blue TB; 1 leg at a time.  -         Exercise 5    Exercise Name 5  GS  -      Cueing 5  Verbal  -      Reps 5  10  -      Time 5  5 sec  -      Additional Comments  with legs straight  -         Exercise 6    Exercise Name 6  standed hip extension  -      Cueing 6  Verbal  -      Reps 6  10 each leg  -      Additional Comments  with patient standing upright; with stomach muscles tight  -         Exercise 7    Exercise Name 7  QS over towel roll  -      Cueing 7  Verbal;Tactile  -      Reps 7  10 each  -      Time 7  5 sec  -         Exercise 8    Exercise Name 8  PPT with march  -      Cueing 8  Verbal;Tactile  -      Reps 8  10  -         Exercise 9    Exercise Name 9  SLR with PPT  -   "    Cueing 9  Verbal  -MH      Reps 9  10 right; 10 left  -MH        User Key  (r) = Recorded By, (t) = Taken By, (c) = Cosigned By    Initials Name Provider Type    Kenji Diaz PTA Physical Therapy Assistant                      Manual Rx (last 36 hours)      Manual Treatments     Row Name 12/27/19 1300             Manual Rx 1    Manual Rx 1 Location  pelvis  -      Manual Rx 1 Type  MET- shotgun  -      Manual Rx 1 Duration  Good pelvic alignment noted today - no further MET.required  -MH        User Key  (r) = Recorded By, (t) = Taken By, (c) = Cosigned By    Initials Name Provider Type    Kenji Diaz PTA Physical Therapy Assistant              Therapy Education  Given: HEP, Posture/body mechanics, Symptoms/condition management  Program: Reinforced  How Provided: Verbal  Provided to: Patient  Level of Understanding: Teach back education performed, Verbalized, Demonstrated              Time Calculation:   Start Time: 1348  Stop Time: 1435  Time Calculation (min): 47 min  Therapy Charges for Today     Code Description Service Date Service Provider Modifiers Qty    54929882144 HC PT ELECTRICAL STIM UNATTENDED 12/27/2019 Kenji Romeo PTA  1    [id number] HC PT THER PROC EA 15 MIN 12/27/2019 Kenji Romeo PTA GP 2                    Kenji Romeo PTA  12/27/2019

## 2019-12-30 RX ORDER — LISINOPRIL 40 MG/1
TABLET ORAL
Qty: 90 TABLET | Refills: 0 | Status: SHIPPED | OUTPATIENT
Start: 2019-12-30 | End: 2020-04-06

## 2019-12-30 RX ORDER — ATORVASTATIN CALCIUM 40 MG/1
TABLET, FILM COATED ORAL
Qty: 30 TABLET | Refills: 0 | Status: SHIPPED | OUTPATIENT
Start: 2019-12-30 | End: 2020-02-03

## 2019-12-31 ENCOUNTER — APPOINTMENT (OUTPATIENT)
Dept: PHYSICAL THERAPY | Facility: HOSPITAL | Age: 59
End: 2019-12-31

## 2020-02-01 DIAGNOSIS — I25.10 CHRONIC CORONARY ARTERY DISEASE: ICD-10-CM

## 2020-02-01 DIAGNOSIS — E13.9 LATENT AUTOIMMUNE DIABETES IN ADULTS (LADA), MANAGED AS TYPE 1 (HCC): ICD-10-CM

## 2020-02-01 DIAGNOSIS — E78.5 HYPERLIPIDEMIA, UNSPECIFIED HYPERLIPIDEMIA TYPE: ICD-10-CM

## 2020-02-01 DIAGNOSIS — K21.9 GASTROESOPHAGEAL REFLUX DISEASE, ESOPHAGITIS PRESENCE NOT SPECIFIED: ICD-10-CM

## 2020-02-03 RX ORDER — ATORVASTATIN CALCIUM 40 MG/1
TABLET, FILM COATED ORAL
Qty: 30 TABLET | Refills: 0 | Status: SHIPPED | OUTPATIENT
Start: 2020-02-03 | End: 2020-03-05

## 2020-02-03 RX ORDER — PANTOPRAZOLE SODIUM 40 MG/1
TABLET, DELAYED RELEASE ORAL
Qty: 30 TABLET | Refills: 4 | Status: SHIPPED | OUTPATIENT
Start: 2020-02-03 | End: 2020-07-01 | Stop reason: SDUPTHER

## 2020-02-04 DIAGNOSIS — I10 ESSENTIAL HYPERTENSION: ICD-10-CM

## 2020-02-04 DIAGNOSIS — I25.10 CHRONIC CORONARY ARTERY DISEASE: ICD-10-CM

## 2020-02-05 RX ORDER — CARVEDILOL 3.12 MG/1
TABLET ORAL
Qty: 180 TABLET | Refills: 0 | Status: SHIPPED | OUTPATIENT
Start: 2020-02-05 | End: 2020-07-01 | Stop reason: SDUPTHER

## 2020-02-11 ENCOUNTER — OFFICE VISIT (OUTPATIENT)
Dept: NEUROSURGERY | Facility: CLINIC | Age: 60
End: 2020-02-11

## 2020-02-11 ENCOUNTER — HOSPITAL ENCOUNTER (OUTPATIENT)
Dept: GENERAL RADIOLOGY | Facility: HOSPITAL | Age: 60
Discharge: HOME | End: 2020-02-11
Admitting: NURSE PRACTITIONER

## 2020-02-11 VITALS
SYSTOLIC BLOOD PRESSURE: 132 MMHG | WEIGHT: 220 LBS | BODY MASS INDEX: 33.34 KG/M2 | DIASTOLIC BLOOD PRESSURE: 74 MMHG | HEART RATE: 74 BPM | HEIGHT: 68 IN

## 2020-02-11 DIAGNOSIS — M43.16 SPONDYLOLISTHESIS OF LUMBAR REGION: ICD-10-CM

## 2020-02-11 DIAGNOSIS — M48.062 SPINAL STENOSIS, LUMBAR REGION, WITH NEUROGENIC CLAUDICATION: ICD-10-CM

## 2020-02-11 DIAGNOSIS — M48.062 SPINAL STENOSIS, LUMBAR REGION, WITH NEUROGENIC CLAUDICATION: Primary | ICD-10-CM

## 2020-02-11 PROCEDURE — 72100 X-RAY EXAM L-S SPINE 2/3 VWS: CPT

## 2020-02-11 PROCEDURE — 99213 OFFICE O/P EST LOW 20 MIN: CPT | Performed by: NURSE PRACTITIONER

## 2020-02-17 NOTE — PROGRESS NOTES
Subjective   History of Present Illness: Kristine Rivas is a 60 y.o. female is here today for follow-up with new lumbar MRI and xray that was ordered for low back pain and left buttock pain with numbness and tingling in left foot.  She had a L4/5 laminectomy with posterior fusion and posterior pedicle screw fixation on 08/21/19. Today, Ms. Rivas reports intermittent back pain, left buttock, intermittent bilateral leg pain with numbness and tingling in her left foot.  Went back and looked at the records and she had 2 years of left leg pain and low back pain prior to my evaluation.  The physician extenders in my office have been following her postop and suggested an accelerated work-up with motion films and MRI to determine the anatomy following surgery.  She is here today to discuss those results and review what is transpired since surgery.    Back Pain   The pain is present in the lumbar spine. The quality of the pain is described as aching, burning, stabbing and shooting. The pain radiates to the left foot, right thigh, right knee, right foot, left thigh and left knee (left buttock). The pain is at a severity of 4/10. The symptoms are aggravated by standing, sitting, twisting, lying down and bending. Associated symptoms include leg pain and numbness (left foot). Pertinent negatives include no bladder incontinence, bowel incontinence or chest pain.       The following portions of the patient's history were reviewed and updated as appropriate: allergies, current medications, past family history, past medical history, past social history, past surgical history and problem list.    Review of Systems   Constitutional: Positive for activity change.   HENT: Negative.    Eyes: Negative.    Respiratory: Negative.  Negative for chest tightness and shortness of breath.    Cardiovascular: Negative.  Negative for chest pain.   Gastrointestinal: Negative.  Negative for bowel incontinence.   Endocrine: Negative.   "  Genitourinary: Negative.  Negative for bladder incontinence.   Musculoskeletal: Positive for back pain.   Skin: Negative.    Allergic/Immunologic: Negative.    Neurological: Positive for numbness (left foot).   Hematological: Negative.    Psychiatric/Behavioral: Negative.    All other systems reviewed and are negative.      Objective     Vitals:    02/25/20 1159   BP: 170/79   Pulse: 65   Weight: 99.8 kg (220 lb)   Height: 172.7 cm (67.99\")     Body mass index is 33.46 kg/m².      Physical Exam  Neurologic Exam     Physical Exam:    CONSTITUTIONAL:  appears well developed, well-nourished and in no acute distress.    NECK: the neck is supple and symmetric. The trachea is midline with no masses.      PULMONARY: Respiratory effort is normal with no increased work of breathing or signs of respiratory distress.    CARDIOVASCULAR: Pedal pulses are +2/4 bilaterally. Examination of the extremities shows no edema or varicosities.    MUSCULOSKELETAL: Gait reveals slight bent over posture when ambulating but she appears to be ambulating better than preoperative    SKIN: The skin is warm, dry and intact.  Lumbar incision healed some tenderness to the left of the incision    NEUROLOGIC:   She has proximal leg weakness of 2/5 when lying flat on the left only.  Distally she has good motor strength in dorsi and plantar flexion bilaterally muscle bulk and tone are normal.  Sensory exam is decreased in the L5 dermatome on the left.  She has no reflexes in both lower extremities.    Cortical function is intact and without deficits. Speech is normal.    PSYCHIATRIC: oriented to person, place and time. Patient's mood and affect are normal.    Assessment/Plan   Independent Review of Radiographic Studies:      I personally reviewed the images from the following studies.    Plain films of the lumbar spine done on February 19, 2020 reveal postoperative fusion L4-5 with instrumentation.  The grade 1 anterolisthesis remains unchanged with " flexion/extension considered to be stable following fusion.    MRI of the lumbar spine done on February 19, 2020 at [facility] reveals the postoperative fusion at L4-5.  There is no residual stenosis at any level at the most at L2-3 and L3-4 there is some mild degenerative narrowing without effect on the neurologic elements.  Certainly at L4-5 there is epidural fibrosis but no significant distortion of the thecal sac and no convincing evidence of neuroforaminal compromise.    Medical Decision Making:      The radiographic imaging both flexion-extension views of the lumbar spine and MRI are reassuring that the surgery has been anatomically successful.  It remains unclear why clinically she has not been able to accelerate her activity as expected although she did have 2 years of symptoms prior to my evaluation.  In this scenario, where anatomically we been able to protect the neurologic elements with a lumbar fusion, I will need to get a physical medicine rehab specialist to help determine where to proceed from here since she has not responded completely to 2 rounds of physical therapy.  She may need a functional capacity exam to determine how to proceed prior to returning to gainful employment.  From the neurosurgical standpoint the x-rays and MRI are reassuring for acceleration of her activity.    Return If she has acceleration of symptoms.    Kristine was seen today for follow-up and back pain.    Diagnoses and all orders for this visit:    Left leg weakness  -     Ambulatory Referral to Physical Medicine Rehab    Chronic left-sided low back pain without sciatica  -     Ambulatory Referral to Physical Medicine Rehab             Harshad Madera MD FACS [facility]  Neurological Surgery

## 2020-02-19 ENCOUNTER — HOSPITAL ENCOUNTER (OUTPATIENT)
Dept: GENERAL RADIOLOGY | Facility: HOSPITAL | Age: 60
Discharge: HOME | End: 2020-02-19

## 2020-02-19 ENCOUNTER — HOSPITAL ENCOUNTER (OUTPATIENT)
Dept: MRI IMAGING | Facility: HOSPITAL | Age: 60
Discharge: HOME | End: 2020-02-19
Admitting: NURSE PRACTITIONER

## 2020-02-19 DIAGNOSIS — M43.16 SPONDYLOLISTHESIS OF LUMBAR REGION: ICD-10-CM

## 2020-02-19 DIAGNOSIS — M48.062 SPINAL STENOSIS, LUMBAR REGION, WITH NEUROGENIC CLAUDICATION: ICD-10-CM

## 2020-02-19 LAB — CREAT BLDA-MCNC: 0.8 MG/DL (ref 0.6–1.3)

## 2020-02-19 PROCEDURE — 72158 MRI LUMBAR SPINE W/O & W/DYE: CPT

## 2020-02-19 PROCEDURE — 0 GADOBENATE DIMEGLUMINE 529 MG/ML SOLUTION: Performed by: NURSE PRACTITIONER

## 2020-02-19 PROCEDURE — 82565 ASSAY OF CREATININE: CPT

## 2020-02-19 PROCEDURE — 72114 X-RAY EXAM L-S SPINE BENDING: CPT

## 2020-02-19 PROCEDURE — A9577 INJ MULTIHANCE: HCPCS | Performed by: NURSE PRACTITIONER

## 2020-02-19 RX ADMIN — GADOBENATE DIMEGLUMINE 20 ML: 529 INJECTION, SOLUTION INTRAVENOUS at 12:54

## 2020-02-25 ENCOUNTER — OFFICE VISIT (OUTPATIENT)
Dept: NEUROSURGERY | Facility: CLINIC | Age: 60
End: 2020-02-25

## 2020-02-25 VITALS
HEIGHT: 68 IN | BODY MASS INDEX: 33.34 KG/M2 | DIASTOLIC BLOOD PRESSURE: 79 MMHG | WEIGHT: 220 LBS | HEART RATE: 65 BPM | SYSTOLIC BLOOD PRESSURE: 170 MMHG

## 2020-02-25 DIAGNOSIS — G89.29 CHRONIC LEFT-SIDED LOW BACK PAIN WITHOUT SCIATICA: ICD-10-CM

## 2020-02-25 DIAGNOSIS — R29.898 LEFT LEG WEAKNESS: Primary | ICD-10-CM

## 2020-02-25 DIAGNOSIS — M54.50 CHRONIC LEFT-SIDED LOW BACK PAIN WITHOUT SCIATICA: ICD-10-CM

## 2020-02-25 PROBLEM — M48.062 SPINAL STENOSIS, LUMBAR REGION, WITH NEUROGENIC CLAUDICATION: Status: RESOLVED | Noted: 2019-08-02 | Resolved: 2020-02-25

## 2020-02-25 PROCEDURE — 99213 OFFICE O/P EST LOW 20 MIN: CPT | Performed by: NEUROLOGICAL SURGERY

## 2020-03-05 DIAGNOSIS — E13.9 LATENT AUTOIMMUNE DIABETES IN ADULTS (LADA), MANAGED AS TYPE 1 (HCC): ICD-10-CM

## 2020-03-05 DIAGNOSIS — I25.10 CHRONIC CORONARY ARTERY DISEASE: ICD-10-CM

## 2020-03-05 DIAGNOSIS — E78.5 HYPERLIPIDEMIA, UNSPECIFIED HYPERLIPIDEMIA TYPE: ICD-10-CM

## 2020-03-05 RX ORDER — ATORVASTATIN CALCIUM 40 MG/1
TABLET, FILM COATED ORAL
Qty: 30 TABLET | Refills: 0 | Status: SHIPPED | OUTPATIENT
Start: 2020-03-05 | End: 2020-04-06

## 2020-04-03 DIAGNOSIS — I25.10 CHRONIC CORONARY ARTERY DISEASE: ICD-10-CM

## 2020-04-03 DIAGNOSIS — E78.5 HYPERLIPIDEMIA, UNSPECIFIED HYPERLIPIDEMIA TYPE: ICD-10-CM

## 2020-04-03 DIAGNOSIS — E13.9 LATENT AUTOIMMUNE DIABETES IN ADULTS (LADA), MANAGED AS TYPE 1 (HCC): ICD-10-CM

## 2020-04-03 DIAGNOSIS — I10 ESSENTIAL HYPERTENSION: ICD-10-CM

## 2020-04-06 RX ORDER — ATORVASTATIN CALCIUM 40 MG/1
TABLET, FILM COATED ORAL
Qty: 30 TABLET | Refills: 0 | Status: SHIPPED | OUTPATIENT
Start: 2020-04-06 | End: 2020-05-07

## 2020-04-06 RX ORDER — LISINOPRIL 40 MG/1
TABLET ORAL
Qty: 90 TABLET | Refills: 0 | Status: SHIPPED | OUTPATIENT
Start: 2020-04-06 | End: 2020-07-01 | Stop reason: SDUPTHER

## 2020-05-07 DIAGNOSIS — E13.9 LATENT AUTOIMMUNE DIABETES IN ADULTS (LADA), MANAGED AS TYPE 1 (HCC): ICD-10-CM

## 2020-05-07 DIAGNOSIS — I25.10 CHRONIC CORONARY ARTERY DISEASE: ICD-10-CM

## 2020-05-07 DIAGNOSIS — E78.5 HYPERLIPIDEMIA, UNSPECIFIED HYPERLIPIDEMIA TYPE: ICD-10-CM

## 2020-05-07 RX ORDER — ATORVASTATIN CALCIUM 40 MG/1
TABLET, FILM COATED ORAL
Qty: 30 TABLET | Refills: 0 | Status: SHIPPED | OUTPATIENT
Start: 2020-05-07 | End: 2020-07-01 | Stop reason: SDUPTHER

## 2020-05-27 ENCOUNTER — TELEPHONE (OUTPATIENT)
Dept: INTERNAL MEDICINE | Facility: CLINIC | Age: 60
End: 2020-05-27

## 2020-05-29 DIAGNOSIS — E11.59 TYPE 2 DIABETES MELLITUS WITH OTHER CIRCULATORY COMPLICATION, WITH LONG-TERM CURRENT USE OF INSULIN (HCC): Primary | ICD-10-CM

## 2020-05-29 DIAGNOSIS — I25.10 CHRONIC CORONARY ARTERY DISEASE: ICD-10-CM

## 2020-05-29 DIAGNOSIS — I10 ESSENTIAL HYPERTENSION: ICD-10-CM

## 2020-05-29 DIAGNOSIS — Z11.59 NEED FOR HEPATITIS C SCREENING TEST: ICD-10-CM

## 2020-05-29 DIAGNOSIS — Z79.4 TYPE 2 DIABETES MELLITUS WITH OTHER CIRCULATORY COMPLICATION, WITH LONG-TERM CURRENT USE OF INSULIN (HCC): Primary | ICD-10-CM

## 2020-05-29 DIAGNOSIS — E78.5 HYPERLIPIDEMIA, UNSPECIFIED HYPERLIPIDEMIA TYPE: ICD-10-CM

## 2020-06-01 ENCOUNTER — TELEPHONE (OUTPATIENT)
Dept: INTERNAL MEDICINE | Facility: CLINIC | Age: 60
End: 2020-06-01

## 2020-06-01 NOTE — TELEPHONE ENCOUNTER
PT IS NEEDING TO SCHEDULE A LAB APPOINTMENT AND F/U VISIT. TRIED TO WARM TRANSFER TO THE OFFICE, WAS UNSUCCESSFUL.

## 2020-06-07 DIAGNOSIS — I25.10 CHRONIC CORONARY ARTERY DISEASE: ICD-10-CM

## 2020-06-07 DIAGNOSIS — E13.9 LATENT AUTOIMMUNE DIABETES IN ADULTS (LADA), MANAGED AS TYPE 1 (HCC): ICD-10-CM

## 2020-06-07 DIAGNOSIS — E78.5 HYPERLIPIDEMIA, UNSPECIFIED HYPERLIPIDEMIA TYPE: ICD-10-CM

## 2020-06-08 RX ORDER — ATORVASTATIN CALCIUM 40 MG/1
TABLET, FILM COATED ORAL
Qty: 30 TABLET | Refills: 0 | OUTPATIENT
Start: 2020-06-08

## 2020-06-25 LAB
ALBUMIN SERPL-MCNC: 4.2 G/DL (ref 3.5–5.2)
ALBUMIN/CREAT UR: 7 MG/G CREAT (ref 0–29)
ALBUMIN/GLOB SERPL: 1.7 G/DL
ALP SERPL-CCNC: 107 U/L (ref 39–117)
ALT SERPL-CCNC: 18 U/L (ref 1–33)
AST SERPL-CCNC: 12 U/L (ref 1–32)
BASOPHILS # BLD AUTO: 0.04 10*3/MM3 (ref 0–0.2)
BASOPHILS NFR BLD AUTO: 0.5 % (ref 0–1.5)
BILIRUB SERPL-MCNC: <0.2 MG/DL (ref 0.2–1.2)
BUN SERPL-MCNC: 16 MG/DL (ref 8–23)
BUN/CREAT SERPL: 21.6 (ref 7–25)
CALCIUM SERPL-MCNC: 9.2 MG/DL (ref 8.6–10.5)
CHLORIDE SERPL-SCNC: 105 MMOL/L (ref 98–107)
CHOLEST SERPL-MCNC: 278 MG/DL (ref 0–200)
CO2 SERPL-SCNC: 26.6 MMOL/L (ref 22–29)
CREAT SERPL-MCNC: 0.74 MG/DL (ref 0.57–1)
CREAT UR-MCNC: 77.7 MG/DL
EOSINOPHIL # BLD AUTO: 0.34 10*3/MM3 (ref 0–0.4)
EOSINOPHIL NFR BLD AUTO: 4.5 % (ref 0.3–6.2)
ERYTHROCYTE [DISTWIDTH] IN BLOOD BY AUTOMATED COUNT: 14.3 % (ref 12.3–15.4)
GLOBULIN SER CALC-MCNC: 2.5 GM/DL
GLUCOSE SERPL-MCNC: 146 MG/DL (ref 65–99)
HBA1C MFR BLD: 9.04 % (ref 4.8–5.6)
HCT VFR BLD AUTO: 37.4 % (ref 34–46.6)
HCV AB S/CO SERPL IA: <0.1 S/CO RATIO (ref 0–0.9)
HDLC SERPL-MCNC: 55 MG/DL (ref 40–60)
HGB BLD-MCNC: 12 G/DL (ref 12–15.9)
IMM GRANULOCYTES # BLD AUTO: 0.03 10*3/MM3 (ref 0–0.05)
IMM GRANULOCYTES NFR BLD AUTO: 0.4 % (ref 0–0.5)
LDLC SERPL CALC-MCNC: 198 MG/DL (ref 0–100)
LDLC/HDLC SERPL: 3.61 {RATIO}
LYMPHOCYTES # BLD AUTO: 3.05 10*3/MM3 (ref 0.7–3.1)
LYMPHOCYTES NFR BLD AUTO: 40.6 % (ref 19.6–45.3)
MCH RBC QN AUTO: 26.9 PG (ref 26.6–33)
MCHC RBC AUTO-ENTMCNC: 32.1 G/DL (ref 31.5–35.7)
MCV RBC AUTO: 83.9 FL (ref 79–97)
MICROALBUMIN UR-MCNC: 5.2 UG/ML
MONOCYTES # BLD AUTO: 0.53 10*3/MM3 (ref 0.1–0.9)
MONOCYTES NFR BLD AUTO: 7 % (ref 5–12)
NEUTROPHILS # BLD AUTO: 3.53 10*3/MM3 (ref 1.7–7)
NEUTROPHILS NFR BLD AUTO: 47 % (ref 42.7–76)
NRBC BLD AUTO-RTO: 0 /100 WBC (ref 0–0.2)
PLATELET # BLD AUTO: 278 10*3/MM3 (ref 140–450)
POTASSIUM SERPL-SCNC: 4.4 MMOL/L (ref 3.5–5.2)
PROT SERPL-MCNC: 6.7 G/DL (ref 6–8.5)
RBC # BLD AUTO: 4.46 10*6/MM3 (ref 3.77–5.28)
SODIUM SERPL-SCNC: 141 MMOL/L (ref 136–145)
TRIGL SERPL-MCNC: 123 MG/DL (ref 0–150)
TSH SERPL DL<=0.005 MIU/L-ACNC: 2.61 UIU/ML (ref 0.27–4.2)
VLDLC SERPL CALC-MCNC: 24.6 MG/DL
WBC # BLD AUTO: 7.52 10*3/MM3 (ref 3.4–10.8)

## 2020-07-01 ENCOUNTER — OFFICE VISIT (OUTPATIENT)
Dept: INTERNAL MEDICINE | Facility: CLINIC | Age: 60
End: 2020-07-01

## 2020-07-01 VITALS
OXYGEN SATURATION: 96 % | WEIGHT: 228.6 LBS | BODY MASS INDEX: 34.65 KG/M2 | DIASTOLIC BLOOD PRESSURE: 92 MMHG | HEIGHT: 68 IN | RESPIRATION RATE: 16 BRPM | TEMPERATURE: 97.6 F | HEART RATE: 78 BPM | SYSTOLIC BLOOD PRESSURE: 134 MMHG

## 2020-07-01 DIAGNOSIS — M48.061 SPINAL STENOSIS OF LUMBAR REGION, UNSPECIFIED WHETHER NEUROGENIC CLAUDICATION PRESENT: Primary | ICD-10-CM

## 2020-07-01 DIAGNOSIS — I25.10 CHRONIC CORONARY ARTERY DISEASE: ICD-10-CM

## 2020-07-01 DIAGNOSIS — E13.9 LATENT AUTOIMMUNE DIABETES IN ADULTS (LADA), MANAGED AS TYPE 1 (HCC): ICD-10-CM

## 2020-07-01 DIAGNOSIS — Z00.00 HEALTH CARE MAINTENANCE: ICD-10-CM

## 2020-07-01 DIAGNOSIS — E10.65 UNCONTROLLED TYPE 1 DIABETES MELLITUS WITH HYPERGLYCEMIA (HCC): ICD-10-CM

## 2020-07-01 DIAGNOSIS — E78.5 HYPERLIPIDEMIA, UNSPECIFIED HYPERLIPIDEMIA TYPE: ICD-10-CM

## 2020-07-01 DIAGNOSIS — I10 ESSENTIAL HYPERTENSION: ICD-10-CM

## 2020-07-01 DIAGNOSIS — M67.432 GANGLION OF LEFT WRIST: ICD-10-CM

## 2020-07-01 DIAGNOSIS — K21.9 GASTROESOPHAGEAL REFLUX DISEASE, ESOPHAGITIS PRESENCE NOT SPECIFIED: ICD-10-CM

## 2020-07-01 PROCEDURE — 90471 IMMUNIZATION ADMIN: CPT | Performed by: INTERNAL MEDICINE

## 2020-07-01 PROCEDURE — 99215 OFFICE O/P EST HI 40 MIN: CPT | Performed by: INTERNAL MEDICINE

## 2020-07-01 PROCEDURE — 90732 PPSV23 VACC 2 YRS+ SUBQ/IM: CPT | Performed by: INTERNAL MEDICINE

## 2020-07-01 RX ORDER — CARVEDILOL 3.12 MG/1
3.12 TABLET ORAL 2 TIMES DAILY
Qty: 180 TABLET | Refills: 1 | Status: SHIPPED | OUTPATIENT
Start: 2020-07-01 | End: 2021-01-04

## 2020-07-01 RX ORDER — ATORVASTATIN CALCIUM 40 MG/1
40 TABLET, FILM COATED ORAL NIGHTLY
Qty: 90 TABLET | Refills: 1 | Status: SHIPPED | OUTPATIENT
Start: 2020-07-01 | End: 2021-01-05 | Stop reason: SDUPTHER

## 2020-07-01 RX ORDER — LISINOPRIL 40 MG/1
40 TABLET ORAL DAILY
Qty: 90 TABLET | Refills: 1 | Status: SHIPPED | OUTPATIENT
Start: 2020-07-01 | End: 2021-01-04

## 2020-07-01 RX ORDER — CYCLOBENZAPRINE HCL 10 MG
10 TABLET ORAL 2 TIMES DAILY PRN
Qty: 60 TABLET | Refills: 1 | Status: SHIPPED | OUTPATIENT
Start: 2020-07-01 | End: 2020-11-06

## 2020-07-01 RX ORDER — PANTOPRAZOLE SODIUM 40 MG/1
40 TABLET, DELAYED RELEASE ORAL DAILY
Qty: 90 TABLET | Refills: 1 | Status: SHIPPED | OUTPATIENT
Start: 2020-07-01 | End: 2021-01-04

## 2020-07-01 NOTE — PROGRESS NOTES
Kristine Rivas is a 60 y.o. female, who presents with a chief complaint of   Chief Complaint   Patient presents with   • Diabetes   • Follow-up       HPI   Pt here for follow up.      Latent autoimmune diabetes - Last OV for her diabetes was 5/13/19. a1c 8.28 last year and 9.04 last week.  She takes novolin r/n bc of cost.  No proteinuria. The pt said she just recently realized that she had been injecting her meds in the exact same sites.  She started rotating sites and her glucoses have been much lower.   She usually takes humulin.  occ recent hypoglycemia.     Left wrist with bump.  She is worried that it is a ganglion cyst.  She is having some tingling and numbness in her hand.      HTN - no ha/dizziness.  On lisinopril and carvedilol    HLD - on statin.  No cramps or myalgias    chronic back pain - Pt follows with Dr. Madera.  She had a L4/5 laminectomy with posterior fusion and posterior pedicle screw fixation on 08/21/19.  She has continues to have some back pain and left leg weakness/numbness.  This causes some issues with her gait bc she feels that her left leg wants to drag.  standing gives her bilateral low back pain. She has done PT but was referred to PMR at [facility] for further evaluation.  This evaluation has been delayed bc of Covid cancellations.  She has been having cramps on the right leg.  She took an old muscle relaxer that she had at home and this did help some.  She tries to walk as much as she can.    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  She says she can't stand for her job now and has a  to try to get disability.    She needs to get UTD on her HM.      2 of her sisters have had breast cancer and she needs her mammogram.    The following portions of the patient's history were reviewed and updated as appropriate: allergies, current medications, past family history, past medical history, past social history, past surgical history and problem list.    Allergies: Patient  has no known allergies.    Review of Systems   HENT: Negative.    Eyes: Negative.    Respiratory: Negative.    Cardiovascular: Negative.    Gastrointestinal: Negative.    Endocrine: Negative.    Genitourinary: Negative.    Musculoskeletal: Positive for back pain.   Skin: Negative.    Allergic/Immunologic: Negative.    Neurological: Positive for weakness and numbness.   Hematological: Negative.    Psychiatric/Behavioral: Negative.    All other systems reviewed and are negative.            Wt Readings from Last 3 Encounters:   07/01/20 104 kg (228 lb 9.6 oz)   02/25/20 99.8 kg (220 lb)   02/19/20 99.8 kg (220 lb)     Temp Readings from Last 3 Encounters:   07/01/20 97.6 °F (36.4 °C) (Temporal)   09/10/19 98.5 °F (36.9 °C) (Oral)   08/24/19 98.1 °F (36.7 °C) (Oral)     BP Readings from Last 3 Encounters:   07/01/20 134/92   02/25/20 170/79   02/11/20 132/74     Pulse Readings from Last 3 Encounters:   07/01/20 78   02/25/20 65   02/11/20 74     Body mass index is 34.77 kg/m².  @LASTSAO2(3)@    Physical Exam   Constitutional: She is oriented to person, place, and time. She appears well-developed and well-nourished. No distress.   HENT:   Head: Normocephalic and atraumatic.   Right Ear: External ear normal.   Left Ear: External ear normal.   Nose: Nose normal.   Mouth/Throat: Oropharynx is clear and moist.   Eyes: Pupils are equal, round, and reactive to light. Conjunctivae and EOM are normal.   Neck: Normal range of motion. Neck supple.   Cardiovascular: Normal rate, regular rhythm, normal heart sounds and intact distal pulses.   Pulmonary/Chest: Effort normal and breath sounds normal. No respiratory distress. She has no wheezes.   Musculoskeletal: She exhibits no edema.   Slight dragging of left leg   Neurological: She is alert and oriented to person, place, and time.   Skin: Skin is warm and dry.   Psychiatric: She has a normal mood and affect. Her behavior is normal. Judgment and thought content normal.   Nursing  note and vitals reviewed.      Results for orders placed or performed in visit on 05/29/20   Comprehensive metabolic panel   Result Value Ref Range    Glucose 146 (H) 65 - 99 mg/dL    BUN 16 8 - 23 mg/dL    Creatinine 0.74 0.57 - 1.00 mg/dL    eGFR Non African Am 80 >60 mL/min/1.73    eGFR African Am 97 >60 mL/min/1.73    BUN/Creatinine Ratio 21.6 7.0 - 25.0    Sodium 141 136 - 145 mmol/L    Potassium 4.4 3.5 - 5.2 mmol/L    Chloride 105 98 - 107 mmol/L    Total CO2 26.6 22.0 - 29.0 mmol/L    Calcium 9.2 8.6 - 10.5 mg/dL    Total Protein 6.7 6.0 - 8.5 g/dL    Albumin 4.20 3.50 - 5.20 g/dL    Globulin 2.5 gm/dL    A/G Ratio 1.7 g/dL    Total Bilirubin <0.2 (L) 0.2 - 1.2 mg/dL    Alkaline Phosphatase 107 39 - 117 U/L    AST (SGOT) 12 1 - 32 U/L    ALT (SGPT) 18 1 - 33 U/L   Lipid Panel With LDL / HDL Ratio   Result Value Ref Range    Total Cholesterol 278 (H) 0 - 200 mg/dL    Triglycerides 123 0 - 150 mg/dL    HDL Cholesterol 55 40 - 60 mg/dL    VLDL Cholesterol 24.6 mg/dL    LDL Cholesterol  198 (H) 0 - 100 mg/dL    LDL/HDL Ratio 3.61    Hemoglobin A1c   Result Value Ref Range    Hemoglobin A1C 9.04 (H) 4.80 - 5.60 %   TSH   Result Value Ref Range    TSH 2.610 0.270 - 4.200 uIU/mL   Microalbumin / Creatinine Urine Ratio - Urine, Clean Catch   Result Value Ref Range    Creatinine, Urine 77.7 Not Estab. mg/dL    Microalbumin, Urine 5.2 Not Estab. ug/mL    Microalbumin/Creatinine Ratio 7 0 - 29 mg/g creat   Hepatitis C antibody   Result Value Ref Range    Hep C Virus Ab <0.1 0.0 - 0.9 s/co ratio   CBC & Differential   Result Value Ref Range    WBC 7.52 3.40 - 10.80 10*3/mm3    RBC 4.46 3.77 - 5.28 10*6/mm3    Hemoglobin 12.0 12.0 - 15.9 g/dL    Hematocrit 37.4 34.0 - 46.6 %    MCV 83.9 79.0 - 97.0 fL    MCH 26.9 26.6 - 33.0 pg    MCHC 32.1 31.5 - 35.7 g/dL    RDW 14.3 12.3 - 15.4 %    Platelets 278 140 - 450 10*3/mm3    Neutrophil Rel % 47.0 42.7 - 76.0 %    Lymphocyte Rel % 40.6 19.6 - 45.3 %    Monocyte Rel % 7.0 5.0  - 12.0 %    Eosinophil Rel % 4.5 0.3 - 6.2 %    Basophil Rel % 0.5 0.0 - 1.5 %    Neutrophils Absolute 3.53 1.70 - 7.00 10*3/mm3    Lymphocytes Absolute 3.05 0.70 - 3.10 10*3/mm3    Monocytes Absolute 0.53 0.10 - 0.90 10*3/mm3    Eosinophils Absolute 0.34 0.00 - 0.40 10*3/mm3    Basophils Absolute 0.04 0.00 - 0.20 10*3/mm3    Immature Granulocyte Rel % 0.4 0.0 - 0.5 %    Immature Grans Absolute 0.03 0.00 - 0.05 10*3/mm3    nRBC 0.0 0.0 - 0.2 /100 WBC           Kristine was seen today for diabetes and follow-up.    Diagnoses and all orders for this visit:    Spinal stenosis of lumbar region, unspecified whether neurogenic claudication present  -     cyclobenzaprine (FLEXERIL) 10 MG tablet; Take 1 tablet by mouth 2 (Two) Times a Day As Needed for Muscle Spasms.    Latent autoimmune diabetes in adults (ANITHA), managed as type 1 (CMS/[facility]) - a1c uncontrolled at 9 but will hold on further insulin dose adjustment bc glucoses lower with changing injection sites/proper administration.  discussed signs/sx and prevention of hypoglycemia.  If further hypoglycemia pt may need to reduce insulin dose.  Pt on novolin r/n bc she doesn't need rx for it and bc it is cheaper than other insulins.    -     atorvastatin (LIPITOR) 40 MG tablet; Take 1 tablet by mouth Every Night.    Chronic coronary artery disease  -     atorvastatin (LIPITOR) 40 MG tablet; Take 1 tablet by mouth Every Night.  -     carvedilol (COREG) 3.125 MG tablet; Take 1 tablet by mouth 2 (Two) Times a Day.    Hyperlipidemia, unspecified hyperlipidemia type  -     atorvastatin (LIPITOR) 40 MG tablet; Take 1 tablet by mouth Every Night.    Essential hypertension  -     carvedilol (COREG) 3.125 MG tablet; Take 1 tablet by mouth 2 (Two) Times a Day.  -     lisinopril (PRINIVIL,ZESTRIL) 40 MG tablet; Take 1 tablet by mouth Daily.    Gastroesophageal reflux disease, esophagitis presence not specified  -     pantoprazole (PROTONIX) 40 MG EC tablet; Take 1 tablet by mouth  Daily.    Health care maintenance  -     Mammo Screening Bilateral With CAD; Future  -     DEXA Bone Density Axial; Future  -     Ambulatory Referral For Screening Colonoscopy  -     Pneumococcal Polysaccharide Vaccine 23-Valent (PPSV23) Greater Than or Equal To 1yo Subcutaneous / IM    Ganglion of left wrist  -     Ambulatory Referral to Hand Surgery      40 minutes was spent in patient care with >50% in counseling about the above issues including medications and disease management plan.         Outpatient Medications Prior to Visit   Medication Sig Dispense Refill   • acetaminophen (TYLENOL) 500 MG tablet Take 1,000 mg by mouth Every 6 (Six) Hours As Needed for Mild Pain .     • aspirin 81 MG tablet Take 1 tablet by mouth Daily. HOLD PER MD INSTR     • insulin NPH (humuLIN N,novoLIN N) 100 UNIT/ML injection Inject 32 Units under the skin Every Night.     • insulin regular (humuLIN R,novoLIN R) 100 UNIT/ML injection Inject 18 Units under the skin into the appropriate area as directed 3 (Three) Times a Day Before Meals. 15-20 units at breakfast  15-20 units at lunch  25-45 units at dinner     • meclizine (ANTIVERT) 25 MG tablet Take 1 tablet by mouth Every 6 (Six) Hours As Needed for dizziness. 20 tablet 0   • melatonin 5 MG tablet tablet Take 5 mg by mouth Every Night.     • WAVESENSE PRESTO test strip USE ONE STRIP TO TEST FOUR TIMES A  each 4   • atorvastatin (LIPITOR) 40 MG tablet TAKE ONE TABLET BY MOUTH ONCE NIGHTLY 30 tablet 0   • carvedilol (COREG) 3.125 MG tablet TAKE ONE TABLET BY MOUTH TWICE A  tablet 0   • cyclobenzaprine (FLEXERIL) 10 MG tablet Take 1 tablet by mouth Every 8 (Eight) Hours As Needed for Muscle Spasms. 60 tablet 1   • lisinopril (PRINIVIL,ZESTRIL) 40 MG tablet TAKE ONE TABLET BY MOUTH DAILY 90 tablet 0   • pantoprazole (PROTONIX) 40 MG EC tablet TAKE ONE TABLET BY MOUTH DAILY 30 tablet 4   • HYDROcodone-acetaminophen (NORCO) 7.5-325 MG per tablet Take 2 tablets by mouth  Every 6 (Six) Hours As Needed for Moderate Pain . 40 tablet 0   • ondansetron ODT (ZOFRAN-ODT) 4 MG disintegrating tablet 1 tablet every 4 hours as needed for nausea or vomiting 20 tablet 0   • sennosides-docusate sodium (SENOKOT-S) 8.6-50 MG tablet Take 1 tablet by mouth At Night As Needed for Constipation. 20 tablet 0     No facility-administered medications prior to visit.      New Medications Ordered This Visit   Medications   • cyclobenzaprine (FLEXERIL) 10 MG tablet     Sig: Take 1 tablet by mouth 2 (Two) Times a Day As Needed for Muscle Spasms.     Dispense:  60 tablet     Refill:  1   • atorvastatin (LIPITOR) 40 MG tablet     Sig: Take 1 tablet by mouth Every Night.     Dispense:  90 tablet     Refill:  1   • carvedilol (COREG) 3.125 MG tablet     Sig: Take 1 tablet by mouth 2 (Two) Times a Day.     Dispense:  180 tablet     Refill:  1   • lisinopril (PRINIVIL,ZESTRIL) 40 MG tablet     Sig: Take 1 tablet by mouth Daily.     Dispense:  90 tablet     Refill:  1   • pantoprazole (PROTONIX) 40 MG EC tablet     Sig: Take 1 tablet by mouth Daily.     Dispense:  90 tablet     Refill:  1     [unfilled]  Medications Discontinued During This Encounter   Medication Reason   • sennosides-docusate sodium (SENOKOT-S) 8.6-50 MG tablet *Therapy completed   • HYDROcodone-acetaminophen (NORCO) 7.5-325 MG per tablet *Therapy completed   • ondansetron ODT (ZOFRAN-ODT) 4 MG disintegrating tablet *Therapy completed   • cyclobenzaprine (FLEXERIL) 10 MG tablet Reorder   • atorvastatin (LIPITOR) 40 MG tablet Reorder   • carvedilol (COREG) 3.125 MG tablet Reorder   • lisinopril (PRINIVIL,ZESTRIL) 40 MG tablet Reorder   • pantoprazole (PROTONIX) 40 MG EC tablet Reorder         Return in about 3 months (around 10/1/2020) for Recheck, labs, Annual physical.  Answers for HPI/ROS submitted by the patient on 6/29/2020   Diabetes problem  What is the primary reason for your visit?: Diabetes

## 2020-11-04 DIAGNOSIS — M48.061 SPINAL STENOSIS OF LUMBAR REGION, UNSPECIFIED WHETHER NEUROGENIC CLAUDICATION PRESENT: ICD-10-CM

## 2020-11-06 RX ORDER — CYCLOBENZAPRINE HCL 10 MG
TABLET ORAL
Qty: 60 TABLET | Refills: 0 | Status: SHIPPED | OUTPATIENT
Start: 2020-11-06 | End: 2021-02-08 | Stop reason: SDUPTHER

## 2021-01-04 DIAGNOSIS — I10 ESSENTIAL HYPERTENSION: ICD-10-CM

## 2021-01-04 DIAGNOSIS — I25.10 CHRONIC CORONARY ARTERY DISEASE: ICD-10-CM

## 2021-01-04 DIAGNOSIS — K21.9 GASTROESOPHAGEAL REFLUX DISEASE: ICD-10-CM

## 2021-01-04 RX ORDER — PANTOPRAZOLE SODIUM 40 MG/1
TABLET, DELAYED RELEASE ORAL
Qty: 30 TABLET | Refills: 0 | Status: SHIPPED | OUTPATIENT
Start: 2021-01-04 | End: 2021-02-08 | Stop reason: SDUPTHER

## 2021-01-04 RX ORDER — LISINOPRIL 20 MG/1
TABLET ORAL
Qty: 60 TABLET | Refills: 0 | Status: SHIPPED | OUTPATIENT
Start: 2021-01-04 | End: 2021-02-08 | Stop reason: SDUPTHER

## 2021-01-04 RX ORDER — CARVEDILOL 3.12 MG/1
TABLET ORAL
Qty: 60 TABLET | Refills: 0 | Status: SHIPPED | OUTPATIENT
Start: 2021-01-04 | End: 2021-02-08 | Stop reason: SDUPTHER

## 2021-01-05 DIAGNOSIS — I25.10 CHRONIC CORONARY ARTERY DISEASE: ICD-10-CM

## 2021-01-05 DIAGNOSIS — E78.5 HYPERLIPIDEMIA, UNSPECIFIED HYPERLIPIDEMIA TYPE: ICD-10-CM

## 2021-01-05 DIAGNOSIS — E13.9 LATENT AUTOIMMUNE DIABETES IN ADULTS (LADA), MANAGED AS TYPE 1 (HCC): ICD-10-CM

## 2021-01-05 RX ORDER — ATORVASTATIN CALCIUM 40 MG/1
40 TABLET, FILM COATED ORAL NIGHTLY
Qty: 90 TABLET | Refills: 1 | Status: SHIPPED | OUTPATIENT
Start: 2021-01-05 | End: 2021-02-08 | Stop reason: SDUPTHER

## 2021-02-08 ENCOUNTER — OFFICE VISIT (OUTPATIENT)
Dept: INTERNAL MEDICINE | Facility: CLINIC | Age: 61
End: 2021-02-08

## 2021-02-08 VITALS
HEART RATE: 86 BPM | WEIGHT: 237.4 LBS | TEMPERATURE: 97.3 F | DIASTOLIC BLOOD PRESSURE: 78 MMHG | SYSTOLIC BLOOD PRESSURE: 128 MMHG | OXYGEN SATURATION: 98 % | BODY MASS INDEX: 35.98 KG/M2 | HEIGHT: 68 IN | RESPIRATION RATE: 16 BRPM

## 2021-02-08 DIAGNOSIS — E10.65 UNCONTROLLED TYPE 1 DIABETES MELLITUS WITH HYPERGLYCEMIA (HCC): Primary | ICD-10-CM

## 2021-02-08 DIAGNOSIS — M48.061 SPINAL STENOSIS OF LUMBAR REGION, UNSPECIFIED WHETHER NEUROGENIC CLAUDICATION PRESENT: ICD-10-CM

## 2021-02-08 DIAGNOSIS — Z12.11 COLON CANCER SCREENING: ICD-10-CM

## 2021-02-08 DIAGNOSIS — I25.10 CHRONIC CORONARY ARTERY DISEASE: ICD-10-CM

## 2021-02-08 DIAGNOSIS — K21.9 GASTROESOPHAGEAL REFLUX DISEASE: ICD-10-CM

## 2021-02-08 DIAGNOSIS — E13.9 LATENT AUTOIMMUNE DIABETES IN ADULTS (LADA), MANAGED AS TYPE 1 (HCC): ICD-10-CM

## 2021-02-08 DIAGNOSIS — E78.5 HYPERLIPIDEMIA, UNSPECIFIED HYPERLIPIDEMIA TYPE: ICD-10-CM

## 2021-02-08 DIAGNOSIS — I10 ESSENTIAL HYPERTENSION: ICD-10-CM

## 2021-02-08 PROCEDURE — 99215 OFFICE O/P EST HI 40 MIN: CPT | Performed by: INTERNAL MEDICINE

## 2021-02-08 RX ORDER — CARVEDILOL 3.12 MG/1
3.12 TABLET ORAL 2 TIMES DAILY
Qty: 180 TABLET | Refills: 1 | Status: SHIPPED | OUTPATIENT
Start: 2021-02-08 | End: 2021-08-30

## 2021-02-08 RX ORDER — LISINOPRIL 20 MG/1
40 TABLET ORAL DAILY
Qty: 180 TABLET | Refills: 1 | Status: SHIPPED | OUTPATIENT
Start: 2021-02-08 | End: 2021-08-11

## 2021-02-08 RX ORDER — CYCLOBENZAPRINE HCL 10 MG
10 TABLET ORAL 2 TIMES DAILY PRN
Qty: 60 TABLET | Refills: 0 | Status: SHIPPED | OUTPATIENT
Start: 2021-02-08 | End: 2021-05-12

## 2021-02-08 RX ORDER — PANTOPRAZOLE SODIUM 40 MG/1
40 TABLET, DELAYED RELEASE ORAL DAILY
Qty: 90 TABLET | Refills: 1 | Status: SHIPPED | OUTPATIENT
Start: 2021-02-08 | End: 2021-08-11

## 2021-02-08 RX ORDER — ATORVASTATIN CALCIUM 40 MG/1
40 TABLET, FILM COATED ORAL NIGHTLY
Qty: 90 TABLET | Refills: 1 | Status: SHIPPED | OUTPATIENT
Start: 2021-02-08 | End: 2022-01-05

## 2021-02-08 NOTE — PROGRESS NOTES
Chief Complaint  Med Refill    Subjective          Kristine Rivas presents to [facility] for   History of Present Illness  Pt here for follow up.  she is very worried about covid.  She lives with her sister and [place].  Her sister is very scared about covid and refuses to go out.  This situation is very stressful for the pt.       Latent autoimmune diabetes - a1c 9.04 in June 2020.  She has not had labs since then.  She is trying to eat better.     She can walk about 10 minutes but stops bc of chronic back pain. She takes novolin r/n bc of cost.  she takes 18-20 units morning and afternoon and then 40-50 units insulin at night.  She has been having some low glucoses lately.       She saw dr. Aaron at [facility] to have a ganglion cyst removed. She says they didn't get the entire cyst bc it was invading the joint and surrounding structures.  After surgery she had an air conditioner fall on her wrist.  Since she has had left wrist swelling and pain in her median nerve.  She is waiting on her EMG to be done.  She had her wrist injected but the shot only helped for about a week.  She has a splint to wear but it makes her pain worse.  She has a f/u appt in April.       HTN - no ha/dizziness.  On lisinopril and carvedilol     HLD - on statin.      Right knee - she has been having cramps x 1 week.  it starts in her knee and is only on the right.  No known injury.       gerd - she has been off ppi x 2 days and stomach killing her    chronic back pain - Pt follows with Dr. Madera.  She had a L4/5 laminectomy with posterior fusion and posterior pedicle screw fixation on 08/21/19.  She has continues to have some back pain and left leg weakness/numbness.  This causes some issues with her gait bc she feels that her left leg wants to drag.  standing gives her bilateral low back pain. She has done PT but was referred to PMR at [facility] for further evaluation.  This evaluation has been  "delayed bc of Covid cancellations. She is anxious about going to pain mgt bc she doesn't want to take a bunch of pills.      She needs to get UTD on her HM.       2 of her sisters have had breast cancer.  mammogam and c-scope ordered July 2020 but pt has not done these yet. No family hx colon cancer. No personal hx of inflammatory bowel disease.      Another sister had a heart attack 2 weeks ago.    Objective   Vital Signs:   /78 (BP Location: Right arm, Patient Position: Sitting, Cuff Size: Large Adult)   Pulse 86   Temp 97.3 °F (36.3 °C) (Temporal)   Resp 16   Ht 172.7 cm (67.99\")   Wt 108 kg (237 lb 6.4 oz)   SpO2 98%   BMI 36.11 kg/m²     Physical Exam  Vitals signs and nursing note reviewed.   Constitutional:       General: She is not in acute distress.     Appearance: She is well-developed.   HENT:      Head: Normocephalic and atraumatic.      Right Ear: External ear normal.      Left Ear: External ear normal.      Nose: Nose normal.   Eyes:      Conjunctiva/sclera: Conjunctivae normal.      Pupils: Pupils are equal, round, and reactive to light.   Neck:      Musculoskeletal: Normal range of motion and neck supple.   Cardiovascular:      Rate and Rhythm: Normal rate and regular rhythm.      Heart sounds: Normal heart sounds.   Pulmonary:      Effort: Pulmonary effort is normal. No respiratory distress.      Breath sounds: Normal breath sounds. No wheezing.   Musculoskeletal: Normal range of motion.      Comments: Normal gait   Skin:     General: Skin is warm and dry.      Capillary Refill: Capillary refill takes less than 2 seconds.   Neurological:      General: No focal deficit present.      Mental Status: She is alert and oriented to person, place, and time.   Psychiatric:         Mood and Affect: Mood normal.         Behavior: Behavior normal.         Thought Content: Thought content normal.         Judgment: Judgment normal.            Result Review :   The following data was reviewed by: " Aura Duckworth MD on 02/08/2021:  Common labs    Common Labsle 2/19/20 6/24/20 6/24/20 6/24/20 6/24/20 6/24/20     0921 0921 0921 0921 0921   Glucose  146 (A)       BUN  16       Creatinine 0.80 0.74       eGFR Non African Am  80       eGFR African Am  97       Sodium  141       Potassium  4.4       Chloride  105       Calcium  9.2       Total Protein  6.7       Albumin  4.20       Total Bilirubin  <0.2 (A)       Alkaline Phosphatase  107       AST (SGOT)  12       ALT (SGPT)  18       WBC   7.52      Hemoglobin   12.0      Hematocrit   37.4      Platelets   278      Total Cholesterol    278 (A)     Triglycerides    123     HDL Cholesterol    55     LDL Cholesterol     198 (A)     Hemoglobin A1C     9.04 (A)    Microalbumin, Urine      5.2   (A) Abnormal value       Comments are available for some flowsheets but are not being displayed.                     Assessment and Plan    Problem List Items Addressed This Visit        Cardiac and Vasculature    Chronic coronary artery disease    Relevant Medications    atorvastatin (LIPITOR) 40 MG tablet    carvedilol (COREG) 3.125 MG tablet    Hyperlipidemia    Relevant Medications    atorvastatin (LIPITOR) 40 MG tablet    Other Relevant Orders    Comprehensive Metabolic Panel    Lipid Panel With LDL / HDL Ratio    Essential hypertension    Relevant Medications    carvedilol (COREG) 3.125 MG tablet    lisinopril (PRINIVIL,ZESTRIL) 20 MG tablet       Endocrine and Metabolic    Latent autoimmune diabetes in adults (ANITHA), managed as type 1 (CMS/HCC)    Relevant Medications    atorvastatin (LIPITOR) 40 MG tablet    lisinopril (PRINIVIL,ZESTRIL) 20 MG tablet       Gastrointestinal Abdominal     Gastroesophageal reflux disease    Overview     Added automatically from request for surgery [id number]         Relevant Medications    pantoprazole (PROTONIX) 40 MG EC tablet      Other Visit Diagnoses     Uncontrolled type 1 diabetes mellitus with hyperglycemia (CMS/HCC)    -  Primary     Relevant Orders    Comprehensive Metabolic Panel    CBC & Differential    T4, Free    TSH    Lipid Panel With LDL / HDL Ratio    Hemoglobin A1c    Microalbumin / Creatinine Urine Ratio - Urine, Clean Catch    Colon cancer screening        Relevant Orders    Cologuard - Stool, Per Rectum    Spinal stenosis of lumbar region, unspecified whether neurogenic claudication present        Relevant Medications    cyclobenzaprine (FLEXERIL) 10 MG tablet        I spent 41 minutes caring for Kristine on this date of service. This time includes time spent by me in the following activities:preparing for the visit, reviewing tests, performing a medically appropriate examination and/or evaluation , counseling and educating the patient/family/caregiver, ordering medications, tests, or procedures, documenting information in the medical record and care coordination  Follow Up   Return in about 3 months (around 5/8/2021) for Recheck, labs.  Patient was given instructions and counseling regarding her condition or for health maintenance advice. Please see specific information pulled into the AVS if appropriate.

## 2021-02-08 NOTE — PATIENT INSTRUCTIONS
"Journal for Nurse Practitioners, 15(4), 263-267. Retrieved October 7, 2019 from [email or web address].com/[email or web address]">   Knee Exercises  Ask your health care provider which exercises are safe for you. Do exercises exactly as told by your health care provider and adjust them as directed. It is normal to feel mild stretching, pulling, tightness, or discomfort as you do these exercises. Stop right away if you feel sudden pain or your pain gets worse. Do not begin these exercises until told by your health care provider.  Stretching and range-of-motion exercises  These exercises warm up your muscles and joints and improve the movement and flexibility of your knee. These exercises also help to relieve pain and swelling.  Knee extension, prone  1. Lie on your abdomen (prone position) on a bed.  2. Place your left / right knee just beyond the edge of the surface so your knee is not on the bed. You can put a towel under your left / right thigh just above your kneecap for comfort.  3. Relax your leg muscles and allow gravity to straighten your knee (extension). You should feel a stretch behind your left / right knee.  4. Hold this position for __________ seconds.  5. Scoot up so your knee is supported between repetitions.  Repeat __________ times. Complete this exercise __________ times a day.  Knee flexion, active    1. Lie on your back with both legs straight. If this causes back discomfort, bend your left / right knee so your foot is flat on the floor.  2. Slowly slide your left / right heel back toward your buttocks. Stop when you feel a gentle stretch in the front of your knee or thigh (flexion).  3. Hold this position for __________ seconds.  4. Slowly slide your left / right heel back to the starting position.  Repeat __________ times. Complete this exercise __________ times a day.  Quadriceps stretch, prone    1. Lie on your abdomen on a firm surface, such as a bed or padded floor.  2. Bend your left / right knee and hold " your ankle. If you cannot reach your ankle or pant leg, loop a belt around your foot and grab the belt instead.  3. Gently pull your heel toward your buttocks. Your knee should not slide out to the side. You should feel a stretch in the front of your thigh and knee (quadriceps).  4. Hold this position for __________ seconds.  Repeat __________ times. Complete this exercise __________ times a day.  Hamstring, supine  1. Lie on your back (supine position).  2. Loop a belt or towel over the ball of your left / right foot. The ball of your foot is on the walking surface, right under your toes.  3. Straighten your left / right knee and slowly pull on the belt to raise your leg until you feel a gentle stretch behind your knee (hamstring).  ? Do not let your knee bend while you do this.  ? Keep your other leg flat on the floor.  4. Hold this position for __________ seconds.  Repeat __________ times. Complete this exercise __________ times a day.  Strengthening exercises  These exercises build strength and endurance in your knee. Endurance is the ability to use your muscles for a long time, even after they get tired.  Quadriceps, isometric  This exercise stretches the muscles in front of your thigh (quadriceps) without moving your knee joint (isometric).  1. Lie on your back with your left / right leg extended and your other knee bent. Put a rolled towel or small pillow under your knee if told by your health care provider.  2. Slowly tense the muscles in the front of your left / right thigh. You should see your kneecap slide up toward your hip or see increased dimpling just above the knee. This motion will push the back of the knee toward the floor.  3. For __________ seconds, hold the muscle as tight as you can without increasing your pain.  4. Relax the muscles slowly and completely.  Repeat __________ times. Complete this exercise __________ times a day.  Straight leg raises  This exercise stretches the muscles in front  "of your thigh (quadriceps) and the muscles that move your hips (hip flexors).  1. Lie on your back with your left / right leg extended and your other knee bent.  2. Tense the muscles in the front of your left / right thigh. You should see your kneecap slide up or see increased dimpling just above the knee. Your thigh may even shake a bit.  3. Keep these muscles tight as you raise your leg 4-6 inches (10-15 cm) off the floor. Do not let your knee bend.  4. Hold this position for __________ seconds.  5. Keep these muscles tense as you lower your leg.  6. Relax your muscles slowly and completely after each repetition.  Repeat __________ times. Complete this exercise __________ times a day.  Hamstring, isometric  1. Lie on your back on a firm surface.  2. Bend your left / right knee about __________ degrees.  3. Dig your left / right heel into the surface as if you are trying to pull it toward your buttocks. Tighten the muscles in the back of your thighs (hamstring) to \"dig\" as hard as you can without increasing any pain.  4. Hold this position for __________ seconds.  5. Release the tension gradually and allow your muscles to relax completely for __________ seconds after each repetition.  Repeat __________ times. Complete this exercise __________ times a day.  Hamstring curls  If told by your health care provider, do this exercise while wearing ankle weights. Begin with __________ lb weights. Then increase the weight by 1 lb (0.5 kg) increments. Do not wear ankle weights that are more than __________ lb.  1. Lie on your abdomen with your legs straight.  2. Bend your left / right knee as far as you can without feeling pain. Keep your hips flat against the floor.  3. Hold this position for __________ seconds.  4. Slowly lower your leg to the starting position.  Repeat __________ times. Complete this exercise __________ times a day.  Squats  This exercise strengthens the muscles in front of your thigh and knee " (quadriceps).  1.  front of a table, with your feet and knees pointing straight ahead. You may rest your hands on the table for balance but not for support.  2. Slowly bend your knees and lower your hips like you are going to sit in a chair.  ? Keep your weight over your heels, not over your toes.  ? Keep your lower legs upright so they are parallel with the table legs.  ? Do not let your hips go lower than your knees.  ? Do not bend lower than told by your health care provider.  ? If your knee pain increases, do not bend as low.  3. Hold the squat position for __________ seconds.  4. Slowly push with your legs to return to standing. Do not use your hands to pull yourself to standing.  Repeat __________ times. Complete this exercise __________ times a day.  Wall slides  This exercise strengthens the muscles in front of your thigh and knee (quadriceps).  1. Lean your back against a smooth wall or door, and walk your feet out 18-24 inches (46-61 cm) from it.  2. Place your feet hip-width apart.  3. Slowly slide down the wall or door until your knees bend __________ degrees. Keep your knees over your heels, not over your toes. Keep your knees in line with your hips.  4. Hold this position for __________ seconds.  Repeat __________ times. Complete this exercise __________ times a day.  Straight leg raises  This exercise strengthens the muscles that rotate the leg at the hip and move it away from your body (hip abductors).  1. Lie on your side with your left / right leg in the top position. Lie so your head, shoulder, knee, and hip line up. You may bend your bottom knee to help you keep your balance.  2. Roll your hips slightly forward so your hips are stacked directly over each other and your left / right knee is facing forward.  3. Leading with your heel, lift your top leg 4-6 inches (10-15 cm). You should feel the muscles in your outer hip lifting.  ? Do not let your foot drift forward.  ? Do not let your knee  roll toward the ceiling.  4. Hold this position for __________ seconds.  5. Slowly return your leg to the starting position.  6. Let your muscles relax completely after each repetition.  Repeat __________ times. Complete this exercise __________ times a day.  Straight leg raises  This exercise stretches the muscles that move your hips away from the front of the pelvis (hip extensors).  1. Lie on your abdomen on a firm surface. You can put a pillow under your hips if that is more comfortable.  2. Tense the muscles in your buttocks and lift your left / right leg about 4-6 inches (10-15 cm). Keep your knee straight as you lift your leg.  3. Hold this position for __________ seconds.  4. Slowly lower your leg to the starting position.  5. Let your leg relax completely after each repetition.  Repeat __________ times. Complete this exercise __________ times a day.  This information is not intended to replace advice given to you by your health care provider. Make sure you discuss any questions you have with your health care provider.  Document Revised: 10/08/2019 Document Reviewed: 10/08/2019  Elsevier Patient Education © 2020 [employer]

## 2021-02-09 LAB
ALBUMIN SERPL-MCNC: 4.4 G/DL (ref 3.5–5.2)
ALBUMIN/CREAT UR: 7 MG/G CREAT (ref 0–29)
ALBUMIN/GLOB SERPL: 1.6 G/DL
ALP SERPL-CCNC: 122 U/L (ref 39–117)
ALT SERPL-CCNC: 17 U/L (ref 1–33)
AST SERPL-CCNC: 18 U/L (ref 1–32)
BASOPHILS # BLD AUTO: 0.04 10*3/MM3 (ref 0–0.2)
BASOPHILS NFR BLD AUTO: 0.5 % (ref 0–1.5)
BILIRUB SERPL-MCNC: 0.2 MG/DL (ref 0–1.2)
BUN SERPL-MCNC: 13 MG/DL (ref 8–23)
BUN/CREAT SERPL: 16.3 (ref 7–25)
CALCIUM SERPL-MCNC: 9.2 MG/DL (ref 8.6–10.5)
CHLORIDE SERPL-SCNC: 100 MMOL/L (ref 98–107)
CHOLEST SERPL-MCNC: 165 MG/DL (ref 0–200)
CO2 SERPL-SCNC: 24.6 MMOL/L (ref 22–29)
CREAT SERPL-MCNC: 0.8 MG/DL (ref 0.57–1)
CREAT UR-MCNC: 66.7 MG/DL
EOSINOPHIL # BLD AUTO: 0.29 10*3/MM3 (ref 0–0.4)
EOSINOPHIL NFR BLD AUTO: 3.5 % (ref 0.3–6.2)
ERYTHROCYTE [DISTWIDTH] IN BLOOD BY AUTOMATED COUNT: 13.7 % (ref 12.3–15.4)
GLOBULIN SER CALC-MCNC: 2.7 GM/DL
GLUCOSE SERPL-MCNC: 167 MG/DL (ref 65–99)
HBA1C MFR BLD: 8.3 % (ref 4.8–5.6)
HCT VFR BLD AUTO: 39.7 % (ref 34–46.6)
HDLC SERPL-MCNC: 57 MG/DL (ref 40–60)
HGB BLD-MCNC: 13 G/DL (ref 12–15.9)
IMM GRANULOCYTES # BLD AUTO: 0.03 10*3/MM3 (ref 0–0.05)
IMM GRANULOCYTES NFR BLD AUTO: 0.4 % (ref 0–0.5)
LDLC SERPL CALC-MCNC: 88 MG/DL (ref 0–100)
LDLC/HDLC SERPL: 1.51 {RATIO}
LYMPHOCYTES # BLD AUTO: 2.9 10*3/MM3 (ref 0.7–3.1)
LYMPHOCYTES NFR BLD AUTO: 34.6 % (ref 19.6–45.3)
MCH RBC QN AUTO: 27.4 PG (ref 26.6–33)
MCHC RBC AUTO-ENTMCNC: 32.7 G/DL (ref 31.5–35.7)
MCV RBC AUTO: 83.8 FL (ref 79–97)
MICROALBUMIN UR-MCNC: 4.9 UG/ML
MONOCYTES # BLD AUTO: 0.52 10*3/MM3 (ref 0.1–0.9)
MONOCYTES NFR BLD AUTO: 6.2 % (ref 5–12)
NEUTROPHILS # BLD AUTO: 4.59 10*3/MM3 (ref 1.7–7)
NEUTROPHILS NFR BLD AUTO: 54.8 % (ref 42.7–76)
NRBC BLD AUTO-RTO: 0 /100 WBC (ref 0–0.2)
PLATELET # BLD AUTO: 327 10*3/MM3 (ref 140–450)
POTASSIUM SERPL-SCNC: 5.1 MMOL/L (ref 3.5–5.2)
PROT SERPL-MCNC: 7.1 G/DL (ref 6–8.5)
RBC # BLD AUTO: 4.74 10*6/MM3 (ref 3.77–5.28)
SODIUM SERPL-SCNC: 136 MMOL/L (ref 136–145)
T4 FREE SERPL-MCNC: 1.21 NG/DL (ref 0.93–1.7)
TRIGL SERPL-MCNC: 109 MG/DL (ref 0–150)
TSH SERPL DL<=0.005 MIU/L-ACNC: 1.26 UIU/ML (ref 0.27–4.2)
VLDLC SERPL CALC-MCNC: 20 MG/DL (ref 5–40)
WBC # BLD AUTO: 8.37 10*3/MM3 (ref 3.4–10.8)

## 2021-03-16 ENCOUNTER — BULK ORDERING (OUTPATIENT)
Dept: CASE MANAGEMENT | Facility: OTHER | Age: 61
End: 2021-03-16

## 2021-03-16 DIAGNOSIS — Z23 IMMUNIZATION DUE: ICD-10-CM

## 2021-05-09 DIAGNOSIS — M48.061 SPINAL STENOSIS OF LUMBAR REGION, UNSPECIFIED WHETHER NEUROGENIC CLAUDICATION PRESENT: ICD-10-CM

## 2021-05-12 RX ORDER — CYCLOBENZAPRINE HCL 10 MG
TABLET ORAL
Qty: 60 TABLET | Refills: 0 | Status: SHIPPED | OUTPATIENT
Start: 2021-05-12 | End: 2021-11-08

## 2021-06-24 ENCOUNTER — TELEPHONE (OUTPATIENT)
Dept: INTERNAL MEDICINE | Facility: CLINIC | Age: 61
End: 2021-06-24

## 2021-08-10 DIAGNOSIS — E13.9 LATENT AUTOIMMUNE DIABETES IN ADULTS (LADA), MANAGED AS TYPE 1 (HCC): ICD-10-CM

## 2021-08-10 DIAGNOSIS — I10 ESSENTIAL HYPERTENSION: ICD-10-CM

## 2021-08-10 DIAGNOSIS — K21.9 GASTROESOPHAGEAL REFLUX DISEASE: ICD-10-CM

## 2021-08-10 NOTE — TELEPHONE ENCOUNTER
PATIENT CALLING STATING SHE DOES NOT HAVE ANY INCOME TO MAKE A COPAY FOR AN OFFICE VISIT OR PAY FOR LABS AT THIS TIME SHE HAS 1 DAY LEFT OF THE LISINOPRIL SHE WOULD LIKE TO GET A REFILL IF POSSIBLE      PLEASE ADVISE  [phone]

## 2021-08-11 RX ORDER — LISINOPRIL 20 MG/1
TABLET ORAL
Qty: 60 TABLET | Refills: 0 | Status: SHIPPED | OUTPATIENT
Start: 2021-08-11 | End: 2021-09-09

## 2021-08-11 RX ORDER — PANTOPRAZOLE SODIUM 40 MG/1
TABLET, DELAYED RELEASE ORAL
Qty: 30 TABLET | Refills: 0 | Status: SHIPPED | OUTPATIENT
Start: 2021-08-11 | End: 2021-09-09

## 2021-08-28 DIAGNOSIS — I10 ESSENTIAL HYPERTENSION: ICD-10-CM

## 2021-08-28 DIAGNOSIS — I25.10 CHRONIC CORONARY ARTERY DISEASE: ICD-10-CM

## 2021-08-30 RX ORDER — CARVEDILOL 3.12 MG/1
TABLET ORAL
Qty: 60 TABLET | Refills: 0 | Status: SHIPPED | OUTPATIENT
Start: 2021-08-30 | End: 2021-09-30 | Stop reason: SDUPTHER

## 2021-09-09 DIAGNOSIS — I10 ESSENTIAL HYPERTENSION: ICD-10-CM

## 2021-09-09 DIAGNOSIS — E13.9 LATENT AUTOIMMUNE DIABETES IN ADULTS (LADA), MANAGED AS TYPE 1 (HCC): ICD-10-CM

## 2021-09-09 DIAGNOSIS — K21.9 GASTROESOPHAGEAL REFLUX DISEASE: ICD-10-CM

## 2021-09-09 RX ORDER — PANTOPRAZOLE SODIUM 40 MG/1
TABLET, DELAYED RELEASE ORAL
Qty: 30 TABLET | Refills: 0 | Status: SHIPPED | OUTPATIENT
Start: 2021-09-09 | End: 2021-10-11

## 2021-09-09 RX ORDER — LISINOPRIL 20 MG/1
TABLET ORAL
Qty: 60 TABLET | Refills: 0 | Status: SHIPPED | OUTPATIENT
Start: 2021-09-09 | End: 2021-10-11

## 2021-09-30 DIAGNOSIS — I25.10 CHRONIC CORONARY ARTERY DISEASE: ICD-10-CM

## 2021-09-30 DIAGNOSIS — I10 ESSENTIAL HYPERTENSION: ICD-10-CM

## 2021-09-30 RX ORDER — CARVEDILOL 3.12 MG/1
TABLET ORAL
Qty: 60 TABLET | Refills: 0 | OUTPATIENT
Start: 2021-09-30

## 2021-09-30 RX ORDER — CARVEDILOL 3.12 MG/1
3.12 TABLET ORAL 2 TIMES DAILY
Qty: 60 TABLET | Refills: 0 | Status: SHIPPED | OUTPATIENT
Start: 2021-09-30 | End: 2021-11-01

## 2021-09-30 NOTE — TELEPHONE ENCOUNTER
Caller: Kristine Rivas    Relationship: Self    Medication requested (name and dosage): carvedilol (COREG) 3.125 MG tablet    Pharmacy where request should be sent: [facility] [address] [place] [address] [address] [facility] - [phone] PH - [phone]   [phone]    Additional details provided by patient: PATIENT SCHEDULED FOR FIRST AVAILABLE ON 10/04/21. TOOK LAST PILL ON 09/30/21    Best call back number: [phone]     Does the patient have less than a 3 day supply:  [x] Yes  [] No    Neil Booker Rep   09/30/21 12:04 EDT

## 2021-10-04 ENCOUNTER — OFFICE VISIT (OUTPATIENT)
Dept: INTERNAL MEDICINE | Facility: CLINIC | Age: 61
End: 2021-10-04

## 2021-10-04 VITALS
OXYGEN SATURATION: 99 % | TEMPERATURE: 98.5 F | WEIGHT: 243 LBS | HEIGHT: 68 IN | BODY MASS INDEX: 36.83 KG/M2 | HEART RATE: 87 BPM | DIASTOLIC BLOOD PRESSURE: 76 MMHG | RESPIRATION RATE: 20 BRPM | SYSTOLIC BLOOD PRESSURE: 138 MMHG

## 2021-10-04 DIAGNOSIS — I10 ESSENTIAL HYPERTENSION: ICD-10-CM

## 2021-10-04 DIAGNOSIS — E78.5 HYPERLIPIDEMIA, UNSPECIFIED HYPERLIPIDEMIA TYPE: ICD-10-CM

## 2021-10-04 DIAGNOSIS — E13.9 LATENT AUTOIMMUNE DIABETES IN ADULTS (LADA), MANAGED AS TYPE 1 (HCC): ICD-10-CM

## 2021-10-04 DIAGNOSIS — I25.10 CHRONIC CORONARY ARTERY DISEASE: Primary | ICD-10-CM

## 2021-10-04 DIAGNOSIS — Z00.00 HEALTHCARE MAINTENANCE: ICD-10-CM

## 2021-10-04 DIAGNOSIS — K21.9 GASTROESOPHAGEAL REFLUX DISEASE, UNSPECIFIED WHETHER ESOPHAGITIS PRESENT: ICD-10-CM

## 2021-10-04 PROCEDURE — 90471 IMMUNIZATION ADMIN: CPT | Performed by: INTERNAL MEDICINE

## 2021-10-04 PROCEDURE — 90686 IIV4 VACC NO PRSV 0.5 ML IM: CPT | Performed by: INTERNAL MEDICINE

## 2021-10-04 PROCEDURE — 99214 OFFICE O/P EST MOD 30 MIN: CPT | Performed by: INTERNAL MEDICINE

## 2021-10-04 NOTE — PROGRESS NOTES
Kristine Rivas is a 61 y.o. female, who presents with a chief complaint of   Chief Complaint   Patient presents with   • Med Refill           HPI   Pt here for follow up.  she is very worried about covid.  She lives with her sister and [place].  Her sister is very scared about covid and refuses to go out.  This situation is very stressful for the pt.       Latent autoimmune diabetes - a1c 9.04 in June 2020.  She has not had labs since then.  She is trying to eat better.     She can walk about 10 minutes but stops bc of chronic back pain. She takes novolin r/n bc of cost.  she takes 18-20 units morning and afternoon and then 40-50 units insulin at night.  She has been having some low glucoses lately.       HTN - no ha/dizziness.  On lisinopril and carvedilol     HLD - on statin.       Right knee - she has been having cramps x 1 week.  it starts in her knee and is only on the right.  No known injury.       gerd - she has been off ppi x 2 days and stomach killing her     chronic back pain - Pt follows with Dr. Madera.  She had a L4/5 laminectomy with posterior fusion and posterior pedicle screw fixation on 08/21/19.  She has continues to have some back pain and left leg weakness/numbness.  This causes some issues with her gait bc she feels that her left leg wants to drag.  standing gives her bilateral low back pain. She has done PT but was referred to PMR at [facility] for further evaluation.  This evaluation has been delayed bc of Covid cancellations. She is anxious about going to pain mgt bc she doesn't want to take a bunch of pills.      She needs to get UTD on her HM.       2 of her sisters have had breast cancer.  mammogam and c-scope ordered July 2020 but pt has not done these yet. No family hx colon cancer. No personal hx of inflammatory bowel disease.       Another sister had a heart attack 2 weeks ago.        The following portions of the patient's history were reviewed and updated as appropriate: allergies,  current medications, past family history, past medical history, past social history, past surgical history and problem list.    Allergies: Patient has no known allergies.    Review of Systems   Constitutional: Negative.    HENT: Negative.    Eyes: Negative.    Respiratory: Negative.    Cardiovascular: Negative.    Gastrointestinal: Negative.    Endocrine: Negative.    Genitourinary: Negative.    Musculoskeletal: Negative.    Skin: Negative.    Allergic/Immunologic: Negative.    Neurological: Negative.    Hematological: Negative.    Psychiatric/Behavioral: Negative.    All other systems reviewed and are negative.            Wt Readings from Last 3 Encounters:   10/04/21 110 kg (243 lb)   02/08/21 108 kg (237 lb 6.4 oz)   07/01/20 104 kg (228 lb 9.6 oz)     Temp Readings from Last 3 Encounters:   10/04/21 98.5 °F (36.9 °C)   02/08/21 97.3 °F (36.3 °C) (Temporal)   07/01/20 97.6 °F (36.4 °C) (Temporal)     BP Readings from Last 3 Encounters:   10/04/21 138/76   02/08/21 128/78   07/01/20 134/92     Pulse Readings from Last 3 Encounters:   10/04/21 87   02/08/21 86   07/01/20 78     Body mass index is 36.96 kg/m².  SpO2 Readings from Last 3 Encounters:   10/04/21 99%   02/08/21 98%   07/01/20 96%          Physical Exam  Vitals and nursing note reviewed.   Constitutional:       General: She is not in acute distress.     Appearance: She is well-developed.   HENT:      Head: Normocephalic and atraumatic.      Right Ear: External ear normal.      Left Ear: External ear normal.      Nose: Nose normal.   Eyes:      Conjunctiva/sclera: Conjunctivae normal.      Pupils: Pupils are equal, round, and reactive to light.   Cardiovascular:      Rate and Rhythm: Normal rate and regular rhythm.      Heart sounds: Normal heart sounds.   Pulmonary:      Effort: Pulmonary effort is normal. No respiratory distress.      Breath sounds: Normal breath sounds. No wheezing.   Musculoskeletal:         General: Normal range of motion.       Cervical back: Normal range of motion and neck supple.      Comments: Normal gait   Skin:     General: Skin is warm and dry.   Neurological:      Mental Status: She is alert and oriented to person, place, and time.   Psychiatric:         Behavior: Behavior normal.         Thought Content: Thought content normal.         Judgment: Judgment normal.         Results for orders placed or performed in visit on 02/08/21   Comprehensive Metabolic Panel    Specimen: Blood   Result Value Ref Range    Glucose 167 (H) 65 - 99 mg/dL    BUN 13 8 - 23 mg/dL    Creatinine 0.80 0.57 - 1.00 mg/dL    eGFR Non African Am 73 >60 mL/min/1.73    eGFR African Am 88 >60 mL/min/1.73    BUN/Creatinine Ratio 16.3 7.0 - 25.0    Sodium 136 136 - 145 mmol/L    Potassium 5.1 3.5 - 5.2 mmol/L    Chloride 100 98 - 107 mmol/L    Total CO2 24.6 22.0 - 29.0 mmol/L    Calcium 9.2 8.6 - 10.5 mg/dL    Total Protein 7.1 6.0 - 8.5 g/dL    Albumin 4.40 3.50 - 5.20 g/dL    Globulin 2.7 gm/dL    A/G Ratio 1.6 g/dL    Total Bilirubin 0.2 0.0 - 1.2 mg/dL    Alkaline Phosphatase 122 (H) 39 - 117 U/L    AST (SGOT) 18 1 - 32 U/L    ALT (SGPT) 17 1 - 33 U/L   T4, Free    Specimen: Blood   Result Value Ref Range    Free T4 1.21 0.93 - 1.70 ng/dL   TSH    Specimen: Blood   Result Value Ref Range    TSH 1.260 0.270 - 4.200 uIU/mL   Lipid Panel With LDL / HDL Ratio    Specimen: Blood   Result Value Ref Range    Total Cholesterol 165 0 - 200 mg/dL    Triglycerides 109 0 - 150 mg/dL    HDL Cholesterol 57 40 - 60 mg/dL    VLDL Cholesterol Tito 20 5 - 40 mg/dL    LDL Chol Calc (NIH) 88 0 - 100 mg/dL    LDL/HDL RATIO 1.51    Hemoglobin A1c    Specimen: Blood   Result Value Ref Range    Hemoglobin A1C 8.30 (H) 4.80 - 5.60 %   Microalbumin / Creatinine Urine Ratio - Urine, Clean Catch    Specimen: Urine, Clean Catch   Result Value Ref Range    Creatinine, Urine 66.7 Not Estab. mg/dL    Microalbumin, Urine 4.9 Not Estab. ug/mL    Microalbumin/Creatinine Ratio 7 0 - 29 mg/g creat    CBC & Differential    Specimen: Blood   Result Value Ref Range    WBC 8.37 3.40 - 10.80 10*3/mm3    RBC 4.74 3.77 - 5.28 10*6/mm3    Hemoglobin 13.0 12.0 - 15.9 g/dL    Hematocrit 39.7 34.0 - 46.6 %    MCV 83.8 79.0 - 97.0 fL    MCH 27.4 26.6 - 33.0 pg    MCHC 32.7 31.5 - 35.7 g/dL    RDW 13.7 12.3 - 15.4 %    Platelets 327 140 - 450 10*3/mm3    Neutrophil Rel % 54.8 42.7 - 76.0 %    Lymphocyte Rel % 34.6 19.6 - 45.3 %    Monocyte Rel % 6.2 5.0 - 12.0 %    Eosinophil Rel % 3.5 0.3 - 6.2 %    Basophil Rel % 0.5 0.0 - 1.5 %    Neutrophils Absolute 4.59 1.70 - 7.00 10*3/mm3    Lymphocytes Absolute 2.90 0.70 - 3.10 10*3/mm3    Monocytes Absolute 0.52 0.10 - 0.90 10*3/mm3    Eosinophils Absolute 0.29 0.00 - 0.40 10*3/mm3    Basophils Absolute 0.04 0.00 - 0.20 10*3/mm3    Immature Granulocyte Rel % 0.4 0.0 - 0.5 %    Immature Grans Absolute 0.03 0.00 - 0.05 10*3/mm3    nRBC 0.0 0.0 - 0.2 /100 WBC     Result Review :                  Assessment and Plan    Diagnoses and all orders for this visit:    1. Chronic coronary artery disease (Primary)  -     Comprehensive Metabolic Panel  -     CBC & Differential  -     T4, Free  -     TSH  -     Lipid Panel With LDL / HDL Ratio    2. Essential hypertension  -     Comprehensive Metabolic Panel  -     CBC & Differential  -     T4, Free  -     TSH  -     Lipid Panel With LDL / HDL Ratio    3. Gastroesophageal reflux disease, unspecified whether esophagitis present    4. 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 Hyperlipidemia, unspecified hyperlipidemia type  -     Comprehensive Metabolic Panel  -     Lipid Panel With LDL / HDL Ratio    6. Healthcare maintenance  -     Mammo Screening Bilateral With CAD; Future    Other orders  -     FluLaval/Fluarix >6 Months ([id number])       Reviewed current  medication plan with patient today.  Pt due for labs as lab results needed to help with medication titration.  Encouraged healthy diet/exercise.  OV labs in  3  months.  Pt to call sooner if any other issues arise.      Encouraged covid vaccination if not already done.  Covid vaccination can be scheduled at [email or web address]            Outpatient Medications Prior to Visit   Medication Sig Dispense Refill   • acetaminophen (TYLENOL) 500 MG tablet Take 1,000 mg by mouth Every 6 (Six) Hours As Needed for Mild Pain .     • aspirin 81 MG tablet Take 1 tablet by mouth Daily. HOLD PER MD INSTR     • atorvastatin (LIPITOR) 40 MG tablet Take 1 tablet by mouth Every Night. 90 tablet 1   • carvedilol (COREG) 3.125 MG tablet Take 1 tablet by mouth 2 (Two) Times a Day. 60 tablet 0   • cyclobenzaprine (FLEXERIL) 10 MG tablet TAKE ONE TABLET BY MOUTH TWICE A DAY AS NEEDED FOR MUSCLE SPASMS 60 tablet 0   • insulin NPH (humuLIN N,novoLIN N) 100 UNIT/ML injection Inject 32 Units under the skin Every Night.     • insulin regular (humuLIN R,novoLIN R) 100 UNIT/ML injection Inject 18 Units under the skin into the appropriate area as directed 3 (Three) Times a Day Before Meals. 15-20 units at breakfast  15-20 units at lunch  25-45 units at dinner     • lisinopril (PRINIVIL,ZESTRIL) 20 MG tablet TAKE TWO TABLETS BY MOUTH DAILY 60 tablet 0   • meclizine (ANTIVERT) 25 MG tablet Take 1 tablet by mouth Every 6 (Six) Hours As Needed for dizziness. 20 tablet 0   • melatonin 5 MG tablet tablet Take 5 mg by mouth Every Night.     • pantoprazole (PROTONIX) 40 MG EC tablet TAKE ONE TABLET BY MOUTH DAILY 30 tablet 0   • WAVESENSE PRESTO test strip USE ONE STRIP TO TEST FOUR TIMES A  each 4     No facility-administered medications prior to visit.     No orders of the defined types were placed in this encounter.    [unfilled]  There are no discontinued medications.      No follow-ups on file.    Patient was given  instructions and counseling regarding her condition or for health maintenance advice. Please see specific information pulled into the AVS if appropriate.

## 2021-10-11 DIAGNOSIS — I10 ESSENTIAL HYPERTENSION: ICD-10-CM

## 2021-10-11 DIAGNOSIS — K21.9 GASTROESOPHAGEAL REFLUX DISEASE: ICD-10-CM

## 2021-10-11 DIAGNOSIS — E13.9 LATENT AUTOIMMUNE DIABETES IN ADULTS (LADA), MANAGED AS TYPE 1 (HCC): ICD-10-CM

## 2021-10-11 RX ORDER — LISINOPRIL 20 MG/1
TABLET ORAL
Qty: 60 TABLET | Refills: 0 | Status: SHIPPED | OUTPATIENT
Start: 2021-10-11 | End: 2021-11-08

## 2021-10-11 RX ORDER — PANTOPRAZOLE SODIUM 40 MG/1
TABLET, DELAYED RELEASE ORAL
Qty: 30 TABLET | Refills: 0 | Status: SHIPPED | OUTPATIENT
Start: 2021-10-11 | End: 2021-11-08

## 2021-10-31 DIAGNOSIS — I25.10 CHRONIC CORONARY ARTERY DISEASE: ICD-10-CM

## 2021-10-31 DIAGNOSIS — I10 ESSENTIAL HYPERTENSION: ICD-10-CM

## 2021-11-01 RX ORDER — CARVEDILOL 3.12 MG/1
TABLET ORAL
Qty: 60 TABLET | Refills: 0 | Status: SHIPPED | OUTPATIENT
Start: 2021-11-01 | End: 2021-12-02

## 2021-11-07 DIAGNOSIS — M48.061 SPINAL STENOSIS OF LUMBAR REGION, UNSPECIFIED WHETHER NEUROGENIC CLAUDICATION PRESENT: ICD-10-CM

## 2021-11-08 DIAGNOSIS — E13.9 LATENT AUTOIMMUNE DIABETES IN ADULTS (LADA), MANAGED AS TYPE 1 (HCC): ICD-10-CM

## 2021-11-08 DIAGNOSIS — I10 ESSENTIAL HYPERTENSION: ICD-10-CM

## 2021-11-08 DIAGNOSIS — K21.9 GASTROESOPHAGEAL REFLUX DISEASE: ICD-10-CM

## 2021-11-08 RX ORDER — LISINOPRIL 20 MG/1
TABLET ORAL
Qty: 60 TABLET | Refills: 0 | Status: SHIPPED | OUTPATIENT
Start: 2021-11-08 | End: 2021-12-08

## 2021-11-08 RX ORDER — CYCLOBENZAPRINE HCL 10 MG
TABLET ORAL
Qty: 30 TABLET | Refills: 0 | Status: SHIPPED | OUTPATIENT
Start: 2021-11-08 | End: 2023-03-06 | Stop reason: SDUPTHER

## 2021-11-08 RX ORDER — PANTOPRAZOLE SODIUM 40 MG/1
TABLET, DELAYED RELEASE ORAL
Qty: 30 TABLET | Refills: 0 | Status: SHIPPED | OUTPATIENT
Start: 2021-11-08 | End: 2021-12-08

## 2021-11-08 NOTE — TELEPHONE ENCOUNTER
Rx Refill Note  Requested Prescriptions     Pending Prescriptions Disp Refills    cyclobenzaprine (FLEXERIL) 10 MG tablet [Pharmacy Med Name: CYCLOBENZAPRINE 10 MG TABLET] 60 tablet 0     Sig: TAKE ONE TABLET BY MOUTH TWICE A DAY AS NEEDED FOR MUSCLE SPASMS      Last office visit with prescribing clinician: 10/4/2021      Next office visit with prescribing clinician: Visit date not found            Elizabeth Young MA  11/08/21, 09:15 EST

## 2021-12-01 DIAGNOSIS — I25.10 CHRONIC CORONARY ARTERY DISEASE: ICD-10-CM

## 2021-12-01 DIAGNOSIS — I10 ESSENTIAL HYPERTENSION: ICD-10-CM

## 2021-12-02 RX ORDER — CARVEDILOL 3.12 MG/1
TABLET ORAL
Qty: 60 TABLET | Refills: 0 | Status: SHIPPED | OUTPATIENT
Start: 2021-12-02 | End: 2022-01-03

## 2021-12-08 DIAGNOSIS — E13.9 LATENT AUTOIMMUNE DIABETES IN ADULTS (LADA), MANAGED AS TYPE 1 (HCC): ICD-10-CM

## 2021-12-08 DIAGNOSIS — K21.9 GASTROESOPHAGEAL REFLUX DISEASE: ICD-10-CM

## 2021-12-08 DIAGNOSIS — I10 ESSENTIAL HYPERTENSION: ICD-10-CM

## 2021-12-08 RX ORDER — LISINOPRIL 20 MG/1
TABLET ORAL
Qty: 60 TABLET | Refills: 0 | Status: SHIPPED | OUTPATIENT
Start: 2021-12-08 | End: 2022-01-05

## 2021-12-08 RX ORDER — PANTOPRAZOLE SODIUM 40 MG/1
TABLET, DELAYED RELEASE ORAL
Qty: 30 TABLET | Refills: 0 | Status: SHIPPED | OUTPATIENT
Start: 2021-12-08 | End: 2022-01-05

## 2022-01-03 ENCOUNTER — TRANSCRIBE ORDERS (OUTPATIENT)
Dept: INTERNAL MEDICINE | Facility: CLINIC | Age: 62
End: 2022-01-03

## 2022-01-03 DIAGNOSIS — I10 ESSENTIAL HYPERTENSION: ICD-10-CM

## 2022-01-03 DIAGNOSIS — I25.10 CHRONIC CORONARY ARTERY DISEASE: ICD-10-CM

## 2022-01-03 DIAGNOSIS — Z12.31 SCREENING MAMMOGRAM, ENCOUNTER FOR: Primary | ICD-10-CM

## 2022-01-03 RX ORDER — CARVEDILOL 3.12 MG/1
TABLET ORAL
Qty: 60 TABLET | Refills: 0 | Status: SHIPPED | OUTPATIENT
Start: 2022-01-03 | End: 2022-02-01

## 2022-01-05 ENCOUNTER — LAB (OUTPATIENT)
Dept: LAB | Facility: HOSPITAL | Age: 62
End: 2022-01-05

## 2022-01-05 DIAGNOSIS — E78.5 HYPERLIPIDEMIA, UNSPECIFIED HYPERLIPIDEMIA TYPE: ICD-10-CM

## 2022-01-05 DIAGNOSIS — E13.9 LATENT AUTOIMMUNE DIABETES IN ADULTS (LADA), MANAGED AS TYPE 1: ICD-10-CM

## 2022-01-05 DIAGNOSIS — I25.10 CHRONIC CORONARY ARTERY DISEASE: ICD-10-CM

## 2022-01-05 DIAGNOSIS — I10 ESSENTIAL HYPERTENSION: ICD-10-CM

## 2022-01-05 DIAGNOSIS — K21.9 GASTROESOPHAGEAL REFLUX DISEASE: ICD-10-CM

## 2022-01-05 LAB
ALBUMIN SERPL-MCNC: 4.7 G/DL (ref 3.5–5.2)
ALBUMIN/GLOB SERPL: 1.8 G/DL
ALP SERPL-CCNC: 113 U/L (ref 39–117)
ALT SERPL W P-5'-P-CCNC: 21 U/L (ref 1–33)
ANION GAP SERPL CALCULATED.3IONS-SCNC: 9.4 MMOL/L (ref 5–15)
AST SERPL-CCNC: 19 U/L (ref 1–32)
BASOPHILS # BLD AUTO: 0.05 10*3/MM3 (ref 0–0.2)
BASOPHILS NFR BLD AUTO: 0.5 % (ref 0–1.5)
BILIRUB SERPL-MCNC: 0.2 MG/DL (ref 0–1.2)
BUN SERPL-MCNC: 12 MG/DL (ref 8–23)
BUN/CREAT SERPL: 14.8 (ref 7–25)
CALCIUM SPEC-SCNC: 9 MG/DL (ref 8.6–10.5)
CHLORIDE SERPL-SCNC: 104 MMOL/L (ref 98–107)
CHOLEST SERPL-MCNC: 159 MG/DL (ref 0–200)
CO2 SERPL-SCNC: 28.6 MMOL/L (ref 22–29)
CREAT SERPL-MCNC: 0.81 MG/DL (ref 0.57–1)
DEPRECATED RDW RBC AUTO: 42.1 FL (ref 37–54)
EOSINOPHIL # BLD AUTO: 0.35 10*3/MM3 (ref 0–0.4)
EOSINOPHIL NFR BLD AUTO: 3.7 % (ref 0.3–6.2)
ERYTHROCYTE [DISTWIDTH] IN BLOOD BY AUTOMATED COUNT: 13.7 % (ref 12.3–15.4)
GFR SERPL CREATININE-BSD FRML MDRD: 72 ML/MIN/1.73
GLOBULIN UR ELPH-MCNC: 2.6 GM/DL
GLUCOSE SERPL-MCNC: 116 MG/DL (ref 65–99)
HBA1C MFR BLD: 8.9 % (ref 4.8–5.6)
HCT VFR BLD AUTO: 41.9 % (ref 34–46.6)
HDLC SERPL-MCNC: 48 MG/DL (ref 40–60)
HGB BLD-MCNC: 13.2 G/DL (ref 12–15.9)
IMM GRANULOCYTES # BLD AUTO: 0.03 10*3/MM3 (ref 0–0.05)
IMM GRANULOCYTES NFR BLD AUTO: 0.3 % (ref 0–0.5)
LDLC SERPL CALC-MCNC: 87 MG/DL (ref 0–100)
LDLC/HDLC SERPL: 1.75 {RATIO}
LYMPHOCYTES # BLD AUTO: 3.92 10*3/MM3 (ref 0.7–3.1)
LYMPHOCYTES NFR BLD AUTO: 41 % (ref 19.6–45.3)
MCH RBC QN AUTO: 26.7 PG (ref 26.6–33)
MCHC RBC AUTO-ENTMCNC: 31.5 G/DL (ref 31.5–35.7)
MCV RBC AUTO: 84.8 FL (ref 79–97)
MONOCYTES # BLD AUTO: 0.62 10*3/MM3 (ref 0.1–0.9)
MONOCYTES NFR BLD AUTO: 6.5 % (ref 5–12)
NEUTROPHILS NFR BLD AUTO: 4.6 10*3/MM3 (ref 1.7–7)
NEUTROPHILS NFR BLD AUTO: 48 % (ref 42.7–76)
NRBC BLD AUTO-RTO: 0 /100 WBC (ref 0–0.2)
PLATELET # BLD AUTO: 297 10*3/MM3 (ref 140–450)
PMV BLD AUTO: 11.1 FL (ref 6–12)
POTASSIUM SERPL-SCNC: 4.4 MMOL/L (ref 3.5–5.2)
PROT SERPL-MCNC: 7.3 G/DL (ref 6–8.5)
RBC # BLD AUTO: 4.94 10*6/MM3 (ref 3.77–5.28)
SODIUM SERPL-SCNC: 142 MMOL/L (ref 136–145)
T4 FREE SERPL-MCNC: 1.16 NG/DL (ref 0.93–1.7)
TRIGL SERPL-MCNC: 134 MG/DL (ref 0–150)
TSH SERPL DL<=0.05 MIU/L-ACNC: 1.99 UIU/ML (ref 0.27–4.2)
VLDLC SERPL-MCNC: 24 MG/DL (ref 5–40)
WBC NRBC COR # BLD: 9.57 10*3/MM3 (ref 3.4–10.8)

## 2022-01-05 PROCEDURE — 84443 ASSAY THYROID STIM HORMONE: CPT | Performed by: INTERNAL MEDICINE

## 2022-01-05 PROCEDURE — 85025 COMPLETE CBC W/AUTO DIFF WBC: CPT | Performed by: INTERNAL MEDICINE

## 2022-01-05 PROCEDURE — 80053 COMPREHEN METABOLIC PANEL: CPT | Performed by: INTERNAL MEDICINE

## 2022-01-05 PROCEDURE — 80061 LIPID PANEL: CPT | Performed by: INTERNAL MEDICINE

## 2022-01-05 PROCEDURE — 36415 COLL VENOUS BLD VENIPUNCTURE: CPT | Performed by: INTERNAL MEDICINE

## 2022-01-05 PROCEDURE — 83036 HEMOGLOBIN GLYCOSYLATED A1C: CPT | Performed by: INTERNAL MEDICINE

## 2022-01-05 PROCEDURE — 84439 ASSAY OF FREE THYROXINE: CPT | Performed by: INTERNAL MEDICINE

## 2022-01-05 RX ORDER — PANTOPRAZOLE SODIUM 40 MG/1
TABLET, DELAYED RELEASE ORAL
Qty: 30 TABLET | Refills: 0 | Status: SHIPPED | OUTPATIENT
Start: 2022-01-05 | End: 2022-02-01

## 2022-01-05 RX ORDER — LISINOPRIL 20 MG/1
TABLET ORAL
Qty: 60 TABLET | Refills: 0 | Status: SHIPPED | OUTPATIENT
Start: 2022-01-05 | End: 2022-02-01

## 2022-01-05 RX ORDER — ATORVASTATIN CALCIUM 40 MG/1
TABLET, FILM COATED ORAL
Qty: 90 TABLET | Refills: 1 | Status: SHIPPED | OUTPATIENT
Start: 2022-01-05 | End: 2022-05-06 | Stop reason: SDUPTHER

## 2022-01-17 ENCOUNTER — TELEPHONE (OUTPATIENT)
Dept: INTERNAL MEDICINE | Facility: CLINIC | Age: 62
End: 2022-01-17

## 2022-01-26 ENCOUNTER — HOSPITAL ENCOUNTER (OUTPATIENT)
Dept: MAMMOGRAPHY | Facility: HOSPITAL | Age: 62
Discharge: HOME | End: 2022-01-26
Admitting: INTERNAL MEDICINE

## 2022-01-26 DIAGNOSIS — Z12.31 SCREENING MAMMOGRAM, ENCOUNTER FOR: ICD-10-CM

## 2022-01-26 PROCEDURE — 77063 BREAST TOMOSYNTHESIS BI: CPT

## 2022-01-26 PROCEDURE — 77067 SCR MAMMO BI INCL CAD: CPT

## 2022-01-31 ENCOUNTER — TELEPHONE (OUTPATIENT)
Dept: INTERNAL MEDICINE | Facility: CLINIC | Age: 62
End: 2022-01-31

## 2022-01-31 DIAGNOSIS — R92.8 FOLLOW-UP EXAMINATION OF ABNORMAL MAMMOGRAM: Primary | ICD-10-CM

## 2022-01-31 NOTE — TELEPHONE ENCOUNTER
Hub please inform patients, the results of    Additional images of right breast requested.  Will place orders for imaging.

## 2022-02-01 DIAGNOSIS — I10 ESSENTIAL HYPERTENSION: ICD-10-CM

## 2022-02-01 DIAGNOSIS — K21.9 GASTROESOPHAGEAL REFLUX DISEASE: ICD-10-CM

## 2022-02-01 DIAGNOSIS — I25.10 CHRONIC CORONARY ARTERY DISEASE: ICD-10-CM

## 2022-02-01 DIAGNOSIS — E13.9 LATENT AUTOIMMUNE DIABETES IN ADULTS (LADA), MANAGED AS TYPE 1: ICD-10-CM

## 2022-02-01 RX ORDER — PANTOPRAZOLE SODIUM 40 MG/1
TABLET, DELAYED RELEASE ORAL
Qty: 30 TABLET | Refills: 0 | Status: SHIPPED | OUTPATIENT
Start: 2022-02-01 | End: 2022-03-07

## 2022-02-01 RX ORDER — LISINOPRIL 20 MG/1
TABLET ORAL
Qty: 60 TABLET | Refills: 0 | Status: SHIPPED | OUTPATIENT
Start: 2022-02-01 | End: 2022-03-07

## 2022-02-01 RX ORDER — CARVEDILOL 3.12 MG/1
TABLET ORAL
Qty: 60 TABLET | Refills: 0 | Status: SHIPPED | OUTPATIENT
Start: 2022-02-01 | End: 2022-03-07

## 2022-02-16 ENCOUNTER — HOSPITAL ENCOUNTER (OUTPATIENT)
Dept: MAMMOGRAPHY | Facility: HOSPITAL | Age: 62
Discharge: HOME | End: 2022-02-16

## 2022-02-16 ENCOUNTER — HOSPITAL ENCOUNTER (OUTPATIENT)
Dept: ULTRASOUND IMAGING | Facility: HOSPITAL | Age: 62
Discharge: HOME | End: 2022-02-16

## 2022-02-16 DIAGNOSIS — R92.8 FOLLOW-UP EXAMINATION OF ABNORMAL MAMMOGRAM: ICD-10-CM

## 2022-02-16 PROCEDURE — 77065 DX MAMMO INCL CAD UNI: CPT

## 2022-02-16 PROCEDURE — 76642 ULTRASOUND BREAST LIMITED: CPT

## 2022-02-16 PROCEDURE — G0279 TOMOSYNTHESIS, MAMMO: HCPCS

## 2022-03-05 DIAGNOSIS — E13.9 LATENT AUTOIMMUNE DIABETES IN ADULTS (LADA), MANAGED AS TYPE 1: ICD-10-CM

## 2022-03-05 DIAGNOSIS — K21.9 GASTROESOPHAGEAL REFLUX DISEASE: ICD-10-CM

## 2022-03-05 DIAGNOSIS — I10 ESSENTIAL HYPERTENSION: ICD-10-CM

## 2022-03-05 DIAGNOSIS — I25.10 CHRONIC CORONARY ARTERY DISEASE: ICD-10-CM

## 2022-03-07 RX ORDER — PANTOPRAZOLE SODIUM 40 MG/1
TABLET, DELAYED RELEASE ORAL
Qty: 30 TABLET | Refills: 0 | Status: SHIPPED | OUTPATIENT
Start: 2022-03-07 | End: 2022-04-08 | Stop reason: SDUPTHER

## 2022-03-07 RX ORDER — LISINOPRIL 20 MG/1
TABLET ORAL
Qty: 60 TABLET | Refills: 0 | Status: SHIPPED | OUTPATIENT
Start: 2022-03-07 | End: 2022-04-08 | Stop reason: SDUPTHER

## 2022-03-07 RX ORDER — CARVEDILOL 3.12 MG/1
TABLET ORAL
Qty: 60 TABLET | Refills: 0 | Status: SHIPPED | OUTPATIENT
Start: 2022-03-07 | End: 2022-04-08 | Stop reason: SDUPTHER

## 2022-03-07 NOTE — TELEPHONE ENCOUNTER
Rx Refill Note  Requested Prescriptions     Pending Prescriptions Disp Refills    pantoprazole (PROTONIX) 40 MG EC tablet [Pharmacy Med Name: PANTOPRAZOLE SOD DR 40 MG TAB] 30 tablet 0     Sig: TAKE ONE TABLET BY MOUTH DAILY    carvedilol (COREG) 3.125 MG tablet [Pharmacy Med Name: CARVEDILOL 3.125 MG TABLET] 60 tablet 0     Sig: TAKE ONE TABLET BY MOUTH TWICE A DAY    lisinopril (PRINIVIL,ZESTRIL) 20 MG tablet [Pharmacy Med Name: LISINOPRIL 20 MG TABLET] 60 tablet 0     Sig: TAKE TWO TABLETS BY MOUTH DAILY      Last office visit with prescribing clinician: 10/4/2021      Next office visit with prescribing clinician: Visit date not found            Alida Galindo MA  03/07/22, 09:20 EST

## 2022-04-07 DIAGNOSIS — I10 ESSENTIAL HYPERTENSION: ICD-10-CM

## 2022-04-07 DIAGNOSIS — K21.9 GASTROESOPHAGEAL REFLUX DISEASE: ICD-10-CM

## 2022-04-07 DIAGNOSIS — E13.9 LATENT AUTOIMMUNE DIABETES IN ADULTS (LADA), MANAGED AS TYPE 1: ICD-10-CM

## 2022-04-07 DIAGNOSIS — I25.10 CHRONIC CORONARY ARTERY DISEASE: ICD-10-CM

## 2022-04-07 RX ORDER — PANTOPRAZOLE SODIUM 40 MG/1
TABLET, DELAYED RELEASE ORAL
Qty: 30 TABLET | Refills: 0 | OUTPATIENT
Start: 2022-04-07

## 2022-04-07 RX ORDER — LISINOPRIL 20 MG/1
TABLET ORAL
Qty: 60 TABLET | Refills: 0 | OUTPATIENT
Start: 2022-04-07

## 2022-04-07 RX ORDER — CARVEDILOL 3.12 MG/1
TABLET ORAL
Qty: 60 TABLET | Refills: 0 | OUTPATIENT
Start: 2022-04-07

## 2022-04-08 DIAGNOSIS — E78.5 HYPERLIPIDEMIA, UNSPECIFIED HYPERLIPIDEMIA TYPE: ICD-10-CM

## 2022-04-08 DIAGNOSIS — I25.10 CHRONIC CORONARY ARTERY DISEASE: ICD-10-CM

## 2022-04-08 DIAGNOSIS — I10 ESSENTIAL HYPERTENSION: ICD-10-CM

## 2022-04-08 DIAGNOSIS — K21.9 GASTROESOPHAGEAL REFLUX DISEASE: ICD-10-CM

## 2022-04-08 DIAGNOSIS — E13.9 LATENT AUTOIMMUNE DIABETES IN ADULTS (LADA), MANAGED AS TYPE 1: ICD-10-CM

## 2022-04-08 RX ORDER — CARVEDILOL 3.12 MG/1
TABLET ORAL
Qty: 60 TABLET | Refills: 0 | OUTPATIENT
Start: 2022-04-08

## 2022-04-08 RX ORDER — LISINOPRIL 20 MG/1
TABLET ORAL
Qty: 60 TABLET | Refills: 0 | OUTPATIENT
Start: 2022-04-08

## 2022-04-08 RX ORDER — CARVEDILOL 3.12 MG/1
3.12 TABLET ORAL 2 TIMES DAILY
Qty: 60 TABLET | Refills: 0 | Status: SHIPPED | OUTPATIENT
Start: 2022-04-08 | End: 2022-05-06 | Stop reason: SDUPTHER

## 2022-04-08 RX ORDER — PANTOPRAZOLE SODIUM 40 MG/1
TABLET, DELAYED RELEASE ORAL
Qty: 30 TABLET | Refills: 0 | OUTPATIENT
Start: 2022-04-08

## 2022-04-08 RX ORDER — PANTOPRAZOLE SODIUM 40 MG/1
40 TABLET, DELAYED RELEASE ORAL DAILY
Qty: 30 TABLET | Refills: 0 | Status: SHIPPED | OUTPATIENT
Start: 2022-04-08 | End: 2022-05-06 | Stop reason: SDUPTHER

## 2022-04-08 RX ORDER — LISINOPRIL 20 MG/1
40 TABLET ORAL DAILY
Qty: 60 TABLET | Refills: 0 | Status: SHIPPED | OUTPATIENT
Start: 2022-04-08 | End: 2022-05-06 | Stop reason: SDUPTHER

## 2022-04-08 NOTE — TELEPHONE ENCOUNTER
Caller: TESSA BECKETT[address] [facility] 53 - [phone] [facility] [phone] FX    Relationship:     Best call back number: Requested Prescriptions:   Requested Prescriptions     Pending Prescriptions Disp Refills   • pantoprazole (PROTONIX) 40 MG EC tablet 30 tablet 0     Sig: Take 1 tablet by mouth Daily.   • carvedilol (COREG) 3.125 MG tablet 60 tablet 0     Sig: Take 1 tablet by mouth 2 (Two) Times a Day.   • insulin regular (humuLIN R,novoLIN R) 100 UNIT/ML injection       Sig: Inject 18 Units under the skin into the appropriate area as directed 3 (Three) Times a Day Before Meals. 15-20 units at breakfast   15-20 units at lunch   25-45 units at dinner     Refused Prescriptions Disp Refills   • pantoprazole (PROTONIX) 40 MG EC tablet [Pharmacy Med Name: PANTOPRAZOLE SOD DR 40 MG TAB] 30 tablet 0     Sig: TAKE ONE TABLET BY MOUTH DAILY     Refused By: CAROLINE ELLIS     Reason for Refusal: Patient needs an appointment   • lisinopril (PRINIVIL,ZESTRIL) 20 MG tablet [Pharmacy Med Name: LISINOPRIL 20 MG TABLET] 60 tablet 0     Sig: TAKE TWO TABLETS BY MOUTH DAILY     Refused By: CAROLINE ELLIS     Reason for Refusal: Patient needs an appointment   • carvedilol (COREG) 3.125 MG tablet [Pharmacy Med Name: CARVEDILOL 3.125 MG TABLET] 60 tablet 0     Sig: TAKE ONE TABLET BY MOUTH TWICE A DAY     Refused By: CAROLINE ELLIS     Reason for Refusal: Patient needs an appointment        Pharmacy where request should be sent: TESSA [address]ECKET[address] [facility] 53 - [phone] [facility] [phone] FX     Additional details provided by patient:PATENT IS OUT OF MEDICATION HAS APPOINTMENT SCHEDULED FOR 04/18    Does the patient have less than a 3 day supply:  [x] Yes  [] No    Neil Manuel   04/08/22 11:05 EDT           
Rx Refill Note  Requested Prescriptions     Pending Prescriptions Disp Refills    pantoprazole (PROTONIX) 40 MG EC tablet 30 tablet 0     Sig: Take 1 tablet by mouth Daily.    carvedilol (COREG) 3.125 MG tablet 60 tablet 0     Sig: Take 1 tablet by mouth 2 (Two) Times a Day.    insulin regular (humuLIN R,novoLIN R) 100 UNIT/ML injection       Sig: Inject 18 Units under the skin into the appropriate area as directed 3 (Three) Times a Day Before Meals. 15-20 units at breakfast   15-20 units at lunch   25-45 units at dinner     Refused Prescriptions Disp Refills    pantoprazole (PROTONIX) 40 MG EC tablet [Pharmacy Med Name: PANTOPRAZOLE SOD DR 40 MG TAB] 30 tablet 0     Sig: TAKE ONE TABLET BY MOUTH DAILY     Refused By: CAROLINE ELLIS     Reason for Refusal: Patient needs an appointment    lisinopril (PRINIVIL,ZESTRIL) 20 MG tablet [Pharmacy Med Name: LISINOPRIL 20 MG TABLET] 60 tablet 0     Sig: TAKE TWO TABLETS BY MOUTH DAILY     Refused By: CAROLINE ELLIS     Reason for Refusal: Patient needs an appointment    carvedilol (COREG) 3.125 MG tablet [Pharmacy Med Name: CARVEDILOL 3.125 MG TABLET] 60 tablet 0     Sig: TAKE ONE TABLET BY MOUTH TWICE A DAY     Refused By: CAROLINE ELLIS     Reason for Refusal: Patient needs an appointment      Last office visit with prescribing clinician: 10/4/2021      Next office visit with prescribing clinician: 4/18/2022            Caroline Ellis MA  04/08/22, 11:09 EDT    
Negative Screen

## 2022-04-08 NOTE — TELEPHONE ENCOUNTER
Caller: Kristine Rivas    Relationship: Self    Best call back number: [phone]    Requested Prescriptions:   Requested Prescriptions     Pending Prescriptions Disp Refills   • lisinopril (PRINIVIL,ZESTRIL) 20 MG tablet 60 tablet 0     Sig: Take 2 tablets by mouth Daily.        Pharmacy where request should be sent: [address] [facility] 53 - [phone]  - [phone] FX     Additional details provided by patient: PATIENT IS UPSET ABOUT MEDICATIONS STATES SHE IS OUT OF THIS. PLEASE ADVISE     Does the patient have less than a 3 day supply:  [x] Yes  [] No    Neil Mcmanus Rep   04/08/22 14:40 EDT

## 2022-05-05 ENCOUNTER — TELEPHONE (OUTPATIENT)
Dept: NEUROSURGERY | Facility: CLINIC | Age: 62
End: 2022-05-05

## 2022-05-05 DIAGNOSIS — Z98.1 HISTORY OF LUMBAR FUSION: ICD-10-CM

## 2022-05-05 DIAGNOSIS — M54.50 CHRONIC LEFT-SIDED LOW BACK PAIN WITHOUT SCIATICA: Primary | ICD-10-CM

## 2022-05-05 DIAGNOSIS — G89.29 CHRONIC LEFT-SIDED LOW BACK PAIN WITHOUT SCIATICA: Primary | ICD-10-CM

## 2022-05-05 DIAGNOSIS — M43.16 SPONDYLOLISTHESIS OF LUMBAR REGION: ICD-10-CM

## 2022-05-05 NOTE — TELEPHONE ENCOUNTER
I put in orders for flexion-extension lumbar films to get things started, so you can make the appointment

## 2022-05-05 NOTE — TELEPHONE ENCOUNTER
Caller: ALINE     Relationship: SELF     Best call back number: [phone]  What is the best time to reach you:    Who are you requesting to speak with (clinical staff, provider,  specific staff member):     Do you know the name of the person who called:     What was the call regarding:     PT LAST SEEN 2/2020.     PT  IS HAVING ISSUES WITH BACK AND LEGS ESPECIALLY LEFT LEG. PT INFORMS PAIN IS GETTING WORSE EACH DAY. PT HAVING ISSUES WITH WALKING. PT DEALING WITH SOME NUMBNESS FROM ANKLE DOWN IN CERTAIN  AREAS AND TINGLING. PT PAIN LEVEL 1 OR 2/10  BUT AS PT MOVES MORE PAIN LEVEL CHANGES.     PT WANTS TO KNOW IS IMAGING IS NEEDED        PLEASE CALL AND ADVISE  THANK YOU

## 2022-05-06 ENCOUNTER — OFFICE VISIT (OUTPATIENT)
Dept: INTERNAL MEDICINE | Facility: CLINIC | Age: 62
End: 2022-05-06

## 2022-05-06 VITALS
SYSTOLIC BLOOD PRESSURE: 140 MMHG | RESPIRATION RATE: 18 BRPM | WEIGHT: 251.6 LBS | HEIGHT: 68 IN | BODY MASS INDEX: 38.13 KG/M2 | HEART RATE: 62 BPM | OXYGEN SATURATION: 98 % | TEMPERATURE: 96.9 F | DIASTOLIC BLOOD PRESSURE: 62 MMHG

## 2022-05-06 DIAGNOSIS — K21.9 GASTROESOPHAGEAL REFLUX DISEASE: ICD-10-CM

## 2022-05-06 DIAGNOSIS — E13.9 LATENT AUTOIMMUNE DIABETES IN ADULTS (LADA), MANAGED AS TYPE 1: ICD-10-CM

## 2022-05-06 DIAGNOSIS — I10 ESSENTIAL HYPERTENSION: ICD-10-CM

## 2022-05-06 DIAGNOSIS — E10.65 UNCONTROLLED TYPE 1 DIABETES MELLITUS WITH HYPERGLYCEMIA: Primary | ICD-10-CM

## 2022-05-06 DIAGNOSIS — E78.5 HYPERLIPIDEMIA, UNSPECIFIED HYPERLIPIDEMIA TYPE: ICD-10-CM

## 2022-05-06 DIAGNOSIS — M48.061 SPINAL STENOSIS OF LUMBAR REGION, UNSPECIFIED WHETHER NEUROGENIC CLAUDICATION PRESENT: ICD-10-CM

## 2022-05-06 DIAGNOSIS — I25.10 CHRONIC CORONARY ARTERY DISEASE: ICD-10-CM

## 2022-05-06 PROCEDURE — 99214 OFFICE O/P EST MOD 30 MIN: CPT | Performed by: INTERNAL MEDICINE

## 2022-05-06 RX ORDER — LISINOPRIL 40 MG/1
40 TABLET ORAL DAILY
Qty: 90 TABLET | Refills: 1 | Status: SHIPPED | OUTPATIENT
Start: 2022-05-06 | End: 2022-11-08

## 2022-05-06 RX ORDER — CYCLOBENZAPRINE HCL 10 MG
10 TABLET ORAL 2 TIMES DAILY PRN
Qty: 90 TABLET | Refills: 2 | Status: CANCELLED | OUTPATIENT
Start: 2022-05-06

## 2022-05-06 RX ORDER — ASPIRIN 81 MG/1
81 TABLET ORAL DAILY
Qty: 90 TABLET | Refills: 3 | Status: SHIPPED | OUTPATIENT
Start: 2022-05-06

## 2022-05-06 RX ORDER — CHOLECALCIFEROL (VITAMIN D3) 125 MCG
5 CAPSULE ORAL NIGHTLY
Qty: 90 TABLET | Refills: 2 | Status: CANCELLED | OUTPATIENT
Start: 2022-05-06

## 2022-05-06 RX ORDER — ATORVASTATIN CALCIUM 40 MG/1
40 TABLET, FILM COATED ORAL NIGHTLY
Qty: 90 TABLET | Refills: 1 | Status: SHIPPED | OUTPATIENT
Start: 2022-05-06 | End: 2023-01-18 | Stop reason: SDUPTHER

## 2022-05-06 RX ORDER — CARVEDILOL 3.12 MG/1
3.12 TABLET ORAL 2 TIMES DAILY
Qty: 180 TABLET | Refills: 1 | Status: SHIPPED | OUTPATIENT
Start: 2022-05-06 | End: 2022-11-08

## 2022-05-06 RX ORDER — PANTOPRAZOLE SODIUM 40 MG/1
40 TABLET, DELAYED RELEASE ORAL DAILY
Qty: 90 TABLET | Refills: 1 | Status: SHIPPED | OUTPATIENT
Start: 2022-05-06 | End: 2022-11-08

## 2022-05-06 RX ORDER — MECLIZINE HYDROCHLORIDE 25 MG/1
25 TABLET ORAL EVERY 6 HOURS PRN
Qty: 20 TABLET | Refills: 0 | Status: CANCELLED | OUTPATIENT
Start: 2022-05-06

## 2022-05-06 RX ORDER — BLOOD SUGAR DIAGNOSTIC
1 STRIP MISCELLANEOUS 4 TIMES DAILY
Qty: 360 EACH | Refills: 4 | Status: SHIPPED | OUTPATIENT
Start: 2022-05-06

## 2022-05-06 NOTE — PROGRESS NOTES
Kristine Rivas is a 62 y.o. female, who presents with a chief complaint of   Chief Complaint   Patient presents with   • Hyperlipidemia   • Hypertension     Follow up and med refill. Has not been seen since 10/2021           HPI   Pt here for follow up.      chronic back pain - Pt follows with Dr. Madera.  She had a L4/5 laminectomy with posterior fusion and posterior pedicle screw fixation on 08/21/19.  she feels like she has never truly recovered from this surgery.  She has continues to have some back pain and left leg weakness/numbness.  She is having worsening back pain and has xrays and f/u with Dr. Madera scheduled soon.      Latent autoimmune diabetes - a1c 8.9 in January.   She can walk about 10 minutes but stops bc of chronic back pain. She takes novolin r/n bc of cost.  she takes 18-20 units morning and afternoon and then 40-50 units insulin at night.  she occ has low glucoses but she doesn't eat regularly.  She is nt always eating healthy things she just eats bc she knows she should.  she has gained weight bc of increased stress and decreased activity.       HTN - no ha/dizziness.  On lisinopril and carvedilol     HLD - on statin.  no ccramps or myalgia.      She needs to get UTD on her HM.       2 of her sisters have had breast cancer.  mammogam normal.  Due for c-scope.  No family hx colon cancer. No personal hx of inflammatory bowel disease.       She has lots of stress at home bc she lives with her sister.  Her sister has had chemo and is now struggling with mental issues. She is looking for a new apartment.    The following portions of the patient's history were reviewed and updated as appropriate: allergies, current medications, past family history, past medical history, past social history, past surgical history and problem list.    Allergies: Patient has no known allergies.    Review of Systems   Constitutional: Negative.    HENT: Negative.    Eyes: Negative.    Respiratory: Negative.     Cardiovascular: Negative.    Gastrointestinal: Negative.    Endocrine: Negative.    Genitourinary: Negative.    Musculoskeletal: Positive for back pain.   Skin: Negative.    Allergic/Immunologic: Negative.    Neurological: Negative.    Hematological: Negative.    Psychiatric/Behavioral: Negative.    All other systems reviewed and are negative.            Wt Readings from Last 3 Encounters:   05/06/22 114 kg (251 lb 9.6 oz)   10/04/21 110 kg (243 lb)   02/08/21 108 kg (237 lb 6.4 oz)     Temp Readings from Last 3 Encounters:   05/06/22 96.9 °F (36.1 °C) (Tympanic)   10/04/21 98.5 °F (36.9 °C)   02/08/21 97.3 °F (36.3 °C) (Temporal)     BP Readings from Last 3 Encounters:   05/06/22 140/62   10/04/21 138/76   02/08/21 128/78     Pulse Readings from Last 3 Encounters:   05/06/22 62   10/04/21 87   02/08/21 86     Body mass index is 38.26 kg/m².  SpO2 Readings from Last 3 Encounters:   05/06/22 98%   10/04/21 99%   02/08/21 98%          Physical Exam  Vitals and nursing note reviewed.   Constitutional:       General: She is not in acute distress.     Appearance: She is well-developed.   HENT:      Head: Normocephalic and atraumatic.      Right Ear: External ear normal.      Left Ear: External ear normal.      Nose: Nose normal.   Eyes:      Conjunctiva/sclera: Conjunctivae normal.      Pupils: Pupils are equal, round, and reactive to light.   Cardiovascular:      Rate and Rhythm: Normal rate and regular rhythm.      Heart sounds: Normal heart sounds.   Pulmonary:      Effort: Pulmonary effort is normal. No respiratory distress.      Breath sounds: Normal breath sounds. No wheezing.   Musculoskeletal:         General: Normal range of motion.      Cervical back: Normal range of motion and neck supple.      Comments: Normal gait   Skin:     General: Skin is warm and dry.   Neurological:      Mental Status: She is alert and oriented to person, place, and time.   Psychiatric:         Behavior: Behavior normal.          Thought Content: Thought content normal.         Judgment: Judgment normal.         Results for orders placed or performed in visit on 10/04/21   Comprehensive Metabolic Panel    Specimen: Blood   Result Value Ref Range    Glucose 116 (H) 65 - 99 mg/dL    BUN 12 8 - 23 mg/dL    Creatinine 0.81 0.57 - 1.00 mg/dL    Sodium 142 136 - 145 mmol/L    Potassium 4.4 3.5 - 5.2 mmol/L    Chloride 104 98 - 107 mmol/L    CO2 28.6 22.0 - 29.0 mmol/L    Calcium 9.0 8.6 - 10.5 mg/dL    Total Protein 7.3 6.0 - 8.5 g/dL    Albumin 4.70 3.50 - 5.20 g/dL    ALT (SGPT) 21 1 - 33 U/L    AST (SGOT) 19 1 - 32 U/L    Alkaline Phosphatase 113 39 - 117 U/L    Total Bilirubin 0.2 0.0 - 1.2 mg/dL    eGFR Non African Amer 72 >60 mL/min/1.73    Globulin 2.6 gm/dL    A/G Ratio 1.8 g/dL    BUN/Creatinine Ratio 14.8 7.0 - 25.0    Anion Gap 9.4 5.0 - 15.0 mmol/L   T4, Free    Specimen: Blood   Result Value Ref Range    Free T4 1.16 0.93 - 1.70 ng/dL   TSH    Specimen: Blood   Result Value Ref Range    TSH 1.990 0.270 - 4.200 uIU/mL   Lipid Panel With LDL / HDL Ratio    Specimen: Blood   Result Value Ref Range    Total Cholesterol 159 0 - 200 mg/dL    Triglycerides 134 0 - 150 mg/dL    HDL Cholesterol 48 40 - 60 mg/dL    LDL Cholesterol  87 0 - 100 mg/dL    VLDL Cholesterol 24 5 - 40 mg/dL    LDL/HDL Ratio 1.75    Hemoglobin A1c    Specimen: Blood   Result Value Ref Range    Hemoglobin A1C 8.90 (H) 4.80 - 5.60 %   CBC Auto Differential    Specimen: Blood   Result Value Ref Range    WBC 9.57 3.40 - 10.80 10*3/mm3    RBC 4.94 3.77 - 5.28 10*6/mm3    Hemoglobin 13.2 12.0 - 15.9 g/dL    Hematocrit 41.9 34.0 - 46.6 %    MCV 84.8 79.0 - 97.0 fL    MCH 26.7 26.6 - 33.0 pg    MCHC 31.5 31.5 - 35.7 g/dL    RDW 13.7 12.3 - 15.4 %    RDW-SD 42.1 37.0 - 54.0 fl    MPV 11.1 6.0 - 12.0 fL    Platelets 297 140 - 450 10*3/mm3    Neutrophil % 48.0 42.7 - 76.0 %    Lymphocyte % 41.0 19.6 - 45.3 %    Monocyte % 6.5 5.0 - 12.0 %    Eosinophil % 3.7 0.3 - 6.2 %     Basophil % 0.5 0.0 - 1.5 %    Immature Grans % 0.3 0.0 - 0.5 %    Neutrophils, Absolute 4.60 1.70 - 7.00 10*3/mm3    Lymphocytes, Absolute 3.92 (H) 0.70 - 3.10 10*3/mm3    Monocytes, Absolute 0.62 0.10 - 0.90 10*3/mm3    Eosinophils, Absolute 0.35 0.00 - 0.40 10*3/mm3    Basophils, Absolute 0.05 0.00 - 0.20 10*3/mm3    Immature Grans, Absolute 0.03 0.00 - 0.05 10*3/mm3    nRBC 0.0 0.0 - 0.2 /100 WBC     Result Review :                  Assessment and Plan    Diagnoses and all orders for this visit:    1. Uncontrolled type 1 diabetes mellitus with hyperglycemia (HCC) (Primary)  -     glucose blood (WaveSense Presto) test strip; 1 each by Other route 4 (Four) Times a Day. use 1 strip to test 4 times a day  Dispense: 360 each; Refill: 4    2. Chronic coronary artery disease  -     atorvastatin (LIPITOR) 40 MG tablet; Take 1 tablet by mouth Every Night.  Dispense: 90 tablet; Refill: 1  -     carvedilol (COREG) 3.125 MG tablet; Take 1 tablet by mouth 2 (Two) Times a Day.  Dispense: 180 tablet; Refill: 1    3. Hyperlipidemia, unspecified hyperlipidemia type  -     atorvastatin (LIPITOR) 40 MG tablet; Take 1 tablet by mouth Every Night.  Dispense: 90 tablet; Refill: 1    4. 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  Dispense: 90 tablet; Refill: 1  -     lisinopril (PRINIVIL,ZESTRIL) 40 MG tablet; Take 1 tablet by mouth Daily.  Dispense: 90 tablet; Refill: 1  -     Comprehensive Metabolic Panel  -     CBC & Differential  -     Hemoglobin A1c    5. Essential hypertension  -     carvedilol (COREG) 3.125 MG tablet; Take 1 tablet by mouth 2 (Two) Times a Day.  Dispense: 180 tablet; Refill: 1  -     lisinopril (PRINIVIL,ZESTRIL) 40 MG tablet; Take 1 tablet by mouth Daily.  Dispense: 90 tablet; Refill: 1    6. Spinal stenosis of lumbar region, unspecified whether neurogenic claudication present    7. Gastroesophageal reflux  disease  -     pantoprazole (PROTONIX) 40 MG EC tablet; Take 1 tablet by mouth Daily.  Dispense: 90 tablet; Refill: 1    Other orders  -     insulin NPH (humuLIN N,novoLIN N) 100 UNIT/ML injection; Inject 32 Units under the skin into the appropriate area as directed Every Night.  Dispense: 9 each; Refill: 2  -     insulin regular (humuLIN R,novoLIN R) 100 UNIT/ML injection; Inject 18 Units under the skin into the appropriate area as directed 3 (Three) Times a Day Before Meals. 15-20 units at breakfast 15-20 units at lunch 25-45 units at dinner  Dispense: 9 each; Refill: 2  -     aspirin 81 MG EC tablet; Take 1 tablet by mouth Daily.  Dispense: 90 tablet; Refill: 3                      Outpatient Medications Prior to Visit   Medication Sig Dispense Refill   • acetaminophen (TYLENOL) 500 MG tablet Take 1,000 mg by mouth Every 6 (Six) Hours As Needed for Mild Pain .     • cyclobenzaprine (FLEXERIL) 10 MG tablet TAKE ONE TABLET BY MOUTH TWICE A DAY AS NEEDED FOR MUSCLE SPASMS 30 tablet 0   • meclizine (ANTIVERT) 25 MG tablet Take 1 tablet by mouth Every 6 (Six) Hours As Needed for dizziness. 20 tablet 0   • melatonin 5 MG tablet tablet Take 5 mg by mouth Every Night.     • aspirin 81 MG tablet Take 1 tablet by mouth Daily. HOLD PER MD INSTR     • atorvastatin (LIPITOR) 40 MG tablet TAKE ONE TABLET BY MOUTH ONCE NIGHTLY 90 tablet 1   • carvedilol (COREG) 3.125 MG tablet Take 1 tablet by mouth 2 (Two) Times a Day. 60 tablet 0   • insulin NPH (humuLIN N,novoLIN N) 100 UNIT/ML injection Inject 32 Units under the skin Every Night.     • insulin regular (humuLIN R,novoLIN R) 100 UNIT/ML injection Inject 18 Units under the skin into the appropriate area as directed 3 (Three) Times a Day Before Meals. 15-20 units at breakfast 15-20 units at lunch 25-45 units at dinner 3 each 0   • lisinopril (PRINIVIL,ZESTRIL) 20 MG tablet Take 2 tablets by mouth Daily. 60 tablet 0   • pantoprazole (PROTONIX) 40 MG EC tablet Take 1 tablet by  mouth Daily. 30 tablet 0   • WAVESENSE PRESTO test strip USE ONE STRIP TO TEST FOUR TIMES A  each 4     No facility-administered medications prior to visit.     New Medications Ordered This Visit   Medications   • atorvastatin (LIPITOR) 40 MG tablet     Sig: Take 1 tablet by mouth Every Night.     Dispense:  90 tablet     Refill:  1   • carvedilol (COREG) 3.125 MG tablet     Sig: Take 1 tablet by mouth 2 (Two) Times a Day.     Dispense:  180 tablet     Refill:  1   • insulin NPH (humuLIN N,novoLIN N) 100 UNIT/ML injection     Sig: Inject 32 Units under the skin into the appropriate area as directed Every Night.     Dispense:  9 each     Refill:  2   • insulin regular (humuLIN R,novoLIN R) 100 UNIT/ML injection     Sig: Inject 18 Units under the skin into the appropriate area as directed 3 (Three) Times a Day Before Meals. 15-20 units at breakfast 15-20 units at lunch 25-45 units at dinner     Dispense:  9 each     Refill:  2   • lisinopril (PRINIVIL,ZESTRIL) 40 MG tablet     Sig: Take 1 tablet by mouth Daily.     Dispense:  90 tablet     Refill:  1   • pantoprazole (PROTONIX) 40 MG EC tablet     Sig: Take 1 tablet by mouth Daily.     Dispense:  90 tablet     Refill:  1   • glucose blood (WaveSense Presto) test strip     Si each by Other route 4 (Four) Times a Day. use 1 strip to test 4 times a day     Dispense:  360 each     Refill:  4   • aspirin 81 MG EC tablet     Sig: Take 1 tablet by mouth Daily.     Dispense:  90 tablet     Refill:  3     [unfilled]  Medications Discontinued During This Encounter   Medication Reason   • aspirin 81 MG tablet Reorder   • insulin NPH (humuLIN N,novoLIN N) 100 UNIT/ML injection Reorder   • WAVESENSE PRESTO test strip Reorder   • atorvastatin (LIPITOR) 40 MG tablet Reorder   • pantoprazole (PROTONIX) 40 MG EC tablet Reorder   • carvedilol (COREG) 3.125 MG tablet Reorder   • insulin regular (humuLIN R,novoLIN R) 100 UNIT/ML injection Reorder   • lisinopril  (PRINIVIL,ZESTRIL) 20 MG tablet Reorder         No follow-ups on file.    Patient was given instructions and counseling regarding her condition or for health maintenance advice. Please see specific information pulled into the AVS if appropriate.

## 2022-05-07 LAB
ALBUMIN SERPL-MCNC: 4.3 G/DL (ref 3.8–4.8)
ALBUMIN/GLOB SERPL: 1.7 {RATIO} (ref 1.2–2.2)
ALP SERPL-CCNC: 111 IU/L (ref 44–121)
ALT SERPL-CCNC: 18 IU/L (ref 0–32)
AST SERPL-CCNC: 12 IU/L (ref 0–40)
BASOPHILS # BLD AUTO: 0 X10E3/UL (ref 0–0.2)
BASOPHILS NFR BLD AUTO: 1 %
BILIRUB SERPL-MCNC: 0.3 MG/DL (ref 0–1.2)
BUN SERPL-MCNC: 12 MG/DL (ref 8–27)
BUN/CREAT SERPL: 15 (ref 12–28)
CALCIUM SERPL-MCNC: 8.9 MG/DL (ref 8.7–10.3)
CHLORIDE SERPL-SCNC: 104 MMOL/L (ref 96–106)
CO2 SERPL-SCNC: 24 MMOL/L (ref 20–29)
CREAT SERPL-MCNC: 0.8 MG/DL (ref 0.57–1)
EGFRCR SERPLBLD CKD-EPI 2021: 83 ML/MIN/1.73
EOSINOPHIL # BLD AUTO: 0.4 X10E3/UL (ref 0–0.4)
EOSINOPHIL NFR BLD AUTO: 5 %
ERYTHROCYTE [DISTWIDTH] IN BLOOD BY AUTOMATED COUNT: 13.8 % (ref 11.7–15.4)
GLOBULIN SER CALC-MCNC: 2.6 G/DL (ref 1.5–4.5)
GLUCOSE SERPL-MCNC: 108 MG/DL (ref 65–99)
HBA1C MFR BLD: 8.6 % (ref 4.8–5.6)
HCT VFR BLD AUTO: 39.6 % (ref 34–46.6)
HGB BLD-MCNC: 12.6 G/DL (ref 11.1–15.9)
IMM GRANULOCYTES # BLD AUTO: 0 X10E3/UL (ref 0–0.1)
IMM GRANULOCYTES NFR BLD AUTO: 0 %
LYMPHOCYTES # BLD AUTO: 3.2 X10E3/UL (ref 0.7–3.1)
LYMPHOCYTES NFR BLD AUTO: 38 %
MCH RBC QN AUTO: 26.3 PG (ref 26.6–33)
MCHC RBC AUTO-ENTMCNC: 31.8 G/DL (ref 31.5–35.7)
MCV RBC AUTO: 83 FL (ref 79–97)
MONOCYTES # BLD AUTO: 0.6 X10E3/UL (ref 0.1–0.9)
MONOCYTES NFR BLD AUTO: 7 %
NEUTROPHILS # BLD AUTO: 4.1 X10E3/UL (ref 1.4–7)
NEUTROPHILS NFR BLD AUTO: 49 %
PLATELET # BLD AUTO: 280 X10E3/UL (ref 150–450)
POTASSIUM SERPL-SCNC: 4.6 MMOL/L (ref 3.5–5.2)
PROT SERPL-MCNC: 6.9 G/DL (ref 6–8.5)
RBC # BLD AUTO: 4.8 X10E6/UL (ref 3.77–5.28)
SODIUM SERPL-SCNC: 143 MMOL/L (ref 134–144)
WBC # BLD AUTO: 8.5 X10E3/UL (ref 3.4–10.8)

## 2022-05-10 ENCOUNTER — HOSPITAL ENCOUNTER (OUTPATIENT)
Dept: GENERAL RADIOLOGY | Facility: HOSPITAL | Age: 62
Discharge: HOME | End: 2022-05-10
Admitting: NEUROLOGICAL SURGERY

## 2022-05-10 DIAGNOSIS — G89.29 CHRONIC LEFT-SIDED LOW BACK PAIN WITHOUT SCIATICA: ICD-10-CM

## 2022-05-10 DIAGNOSIS — M43.16 SPONDYLOLISTHESIS OF LUMBAR REGION: ICD-10-CM

## 2022-05-10 DIAGNOSIS — Z98.1 HISTORY OF LUMBAR FUSION: ICD-10-CM

## 2022-05-10 DIAGNOSIS — M54.50 CHRONIC LEFT-SIDED LOW BACK PAIN WITHOUT SCIATICA: ICD-10-CM

## 2022-05-10 PROCEDURE — 72114 X-RAY EXAM L-S SPINE BENDING: CPT

## 2022-05-10 NOTE — PROGRESS NOTES
Subjective   History of Present Illness: Kristine Rivas is a 62 y.o. female is here today for follow-up. Ms. Rivas was last seen on 02-25-20 with complaints of intermittent back pain, left buttock, intermittent bilateral leg pain with numbness and tingling in her left foot. She had a L4-5 laminectomy with posterior fusion and posterior screw fixation on 08-21-19. Patient contacted the office on 05-05-22 with complaints of back and leg issues. She is here today with new lumbar Xray's.     Today, Ms. Rivas reports back pain that radiates into the left leg with numbness, tingling and weakness.  The symptoms were similar to what she had residual following a lumbar fusion over 2 years ago and I updated the work-up following her recovery from fusion surgery which was reassuring for adequate decompression.  We have referred her to physical medicine rehab but she was unable to do that because of the pandemic starting.  She notices that her ability to ambulate distances continues to diminish as time goes on because of a sense of pressure in the back and sensation in the both legs that stops her from walking.  She denies any bowel or bladder symptoms.    While in the room and during my examination of the patient I wore a mask and eye protection.  I washed my hands before and after this patient encounter.  The patient was also wearing a mask.    Back Pain  The current episode started 1 to 4 weeks ago. The problem occurs constantly. The problem has been gradually worsening since onset. The quality of the pain is described as shooting. The pain radiates to the left knee, left thigh and left foot. The pain is moderate. The symptoms are aggravated by position. Associated symptoms include numbness, tingling and weakness. Pertinent negatives include no bladder incontinence, bowel incontinence or chest pain.       The following portions of the patient's history were reviewed and updated as appropriate: allergies, current  "medications, past family history, past medical history, past social history, past surgical history and problem list.    Review of Systems   Respiratory: Negative for chest tightness and shortness of breath.    Cardiovascular: Negative for chest pain.   Gastrointestinal: Negative for bowel incontinence.   Genitourinary: Negative for bladder incontinence.   Musculoskeletal: Positive for back pain.   Neurological: Positive for tingling, weakness and numbness.       Objective     Vitals:    05/12/22 1246   BP: 154/88   Pulse: 67   Temp: 97.3 °F (36.3 °C)   SpO2: 97%   Weight: 114 kg (251 lb)   Height: 172.7 cm (68\")     Body mass index is 38.16 kg/m².      Physical Exam  Neurologic Exam    Physical Exam:    CONSTITUTIONAL:  appears well developed, well-nourished and in no acute distress.    NECK: the neck is supple and symmetric. The trachea is midline with no masses.      PULMONARY: Respiratory effort is normal with no increased work of breathing or signs of respiratory distress.    CARDIOVASCULAR: Pedal pulses are +2/4 bilaterally. Examination of the extremities shows no edema or varicosities.    MUSCULOSKELETAL: Gait wide-based    SKIN: The skin is warm, dry and intact.  Lumbar incision healed    NEUROLOGIC:   Normal motor strength noted in both lower extremities. Muscle bulk and tone are normal.  Sensory exam is normal to fine touch in both lower extremities.  Reflexes are diminished at the knees and the ankles but symmetrical  Straight leg raising test is negative bilaterally with some buttock pain on the left with straight leg raising  Cortical function is intact and without deficits. Speech is normal.    PSYCHIATRIC: oriented to person, place and time. Patient's mood and affect are normal.      [unfilled]  Independent Review of Radiographic Studies:      I personally reviewed the images from the following studies.    Lumbar radiographs done on May 10, 2022 at [facility] reveals lumbar fusion with " instrumentation at L4-5 documented is stable from the prior studies.  No motion with flexion extension.    MRI of the lumbar spine done on February 19, 2020 at [facility] reveals the postoperative fusion at L4-5.  There is no residual stenosis at any level at the most at L2-3 and L3-4 there is some mild degenerative narrowing without effect on the neurologic elements.  Certainly at L4-5 there is epidural fibrosis but no significant distortion of the thecal sac and no convincing evidence of neuroforaminal compromise.    Medical Decision Making:      It has been almost 3 years since her lumbar fusion she still has persistent low back and bilateral lower extremity symptoms.  Our radiographic work-up following recovery from lumbar fusion was reassuring 2 years ago but given her progression of symptoms I am compelled to update the work-up.  I will send her for an MRI of the lumbar spine with and without contrast I will see her back with the results.  If she has developed adjacent level disease I will have to ask one of my orthopedic colleagues to evaluate for a much more complex spinal issue.  If the radiographic work-up is reassuring we will refer her back to a physical medicine rehab specialist as well as interventional pain management specialist.    Return for review of completed images.    Diagnoses and all orders for this visit:    1. History of lumbar fusion (Primary)  -     MRI Lumbar Spine With & Without Contrast; Future    2. Chronic midline low back pain with left-sided sciatica  -     MRI Lumbar Spine With & Without Contrast; Future             Harshad Madera MD FACS FAANS  Neurological Surgery

## 2022-05-11 ENCOUNTER — TELEPHONE (OUTPATIENT)
Dept: INTERNAL MEDICINE | Facility: CLINIC | Age: 62
End: 2022-05-11

## 2022-05-11 NOTE — TELEPHONE ENCOUNTER
Caller: Kristine Rivas    Relationship: Self    Best call back number: [phone]     What was the call regarding:   PATIENT CALLED STATING THAT SHE NEEDS HER PRESCRIPTIONS   insulin NPH (humuLIN N,novoLIN N) 100 UNIT/ML injection  aspirin 81 MG EC tablet CANCELED WITH   [facility] [address] - [address] [facility] 53 - [phone]  - [phone] FX  PATIENT KEEPS GETTING REMINDERS THAT THESE PRESCRIPTIONS ARE READY FOR HER AT THE PHARMACY BUT SHE BUYS THESE OVER THE COUNTER AT [facility]. PHARMACY TOLD HER TO HAVE HER DOCTOR TO CANCEL THEM.  Do you require a callback: PLEASE CALL AND ADVISE

## 2022-05-11 NOTE — TELEPHONE ENCOUNTER
PATIENT CALLED STATING THAT SHE NEEDS HER PRESCRIPTIONS   insulin NPH (humuLIN N,novoLIN N) 100 UNIT/ML injection  aspirin 81 MG EC tablet CANCELED WITH   TESSA BECKETTPATTIE [facility] [place] - [address] [facility] 53 - [phone]  - [phone] FX  PATIENT KEEPS GETTING REMINDERS THAT THESE PRESCRIPTIONS ARE READY FOR HER AT THE PHARMACY BUT SHE BUYS THESE OVER THE COUNTER AT [facility]. PHARMACY TOLD HER TO HAVE HER DOCTOR TO CANCEL THEM.

## 2022-05-12 ENCOUNTER — OFFICE VISIT (OUTPATIENT)
Dept: NEUROSURGERY | Facility: CLINIC | Age: 62
End: 2022-05-12

## 2022-05-12 VITALS
HEIGHT: 68 IN | BODY MASS INDEX: 38.04 KG/M2 | TEMPERATURE: 97.3 F | SYSTOLIC BLOOD PRESSURE: 154 MMHG | DIASTOLIC BLOOD PRESSURE: 88 MMHG | OXYGEN SATURATION: 97 % | HEART RATE: 67 BPM | WEIGHT: 251 LBS

## 2022-05-12 DIAGNOSIS — M54.42 CHRONIC MIDLINE LOW BACK PAIN WITH LEFT-SIDED SCIATICA: ICD-10-CM

## 2022-05-12 DIAGNOSIS — Z98.1 HISTORY OF LUMBAR FUSION: Primary | ICD-10-CM

## 2022-05-12 DIAGNOSIS — G89.29 CHRONIC MIDLINE LOW BACK PAIN WITH LEFT-SIDED SCIATICA: ICD-10-CM

## 2022-05-12 PROCEDURE — 99213 OFFICE O/P EST LOW 20 MIN: CPT | Performed by: NEUROLOGICAL SURGERY

## 2022-05-13 ENCOUNTER — TELEPHONE (OUTPATIENT)
Dept: INTERNAL MEDICINE | Facility: CLINIC | Age: 62
End: 2022-05-13

## 2022-05-13 NOTE — TELEPHONE ENCOUNTER
----- Message from Aura Duckworth MD sent at 5/11/2022  1:25 PM EDT -----  Call pt about labs.  Pt's a1c is 8.6.  she needs tighter control of glucoses.  Will her insurance cover any of the newer insulins.  R and N insulin preparations are not going to be enough to get good glucose control

## 2022-06-03 ENCOUNTER — HOSPITAL ENCOUNTER (OUTPATIENT)
Dept: MRI IMAGING | Facility: HOSPITAL | Age: 62
Discharge: HOME | End: 2022-06-03

## 2022-06-03 DIAGNOSIS — Z98.1 HISTORY OF LUMBAR FUSION: ICD-10-CM

## 2022-06-03 DIAGNOSIS — M54.42 CHRONIC MIDLINE LOW BACK PAIN WITH LEFT-SIDED SCIATICA: ICD-10-CM

## 2022-06-03 DIAGNOSIS — G89.29 CHRONIC MIDLINE LOW BACK PAIN WITH LEFT-SIDED SCIATICA: ICD-10-CM

## 2022-06-10 NOTE — PROGRESS NOTES
"Subjective   History of Present Illness: Kristine Rivas is a 62 y.o. female is here today for follow-up with a new lumbar MRI that was ordered for back pain that radiates into the left leg with numbness, tingling and weakness.    Today, Ms. Rivas reports back pain that radiates into the left leg with numbness, tingling and weakness. She reports there has been no change in her symptoms.  She states she has problems every day with the pain in the low back radiating into the left leg with activity.  She has noticed a numb sensation in both feet.    While in the room and during my examination of the patient I wore a mask and eye protection.  I washed my hands before and after this patient encounter.  The patient was also wearing a mask.    Back Pain  The problem occurs constantly. The problem has been gradually worsening since onset. The pain is present in the lumbar spine. The pain radiates to the left knee, left thigh and left foot. Associated symptoms include leg pain, numbness, tingling and weakness. Pertinent negatives include no chest pain.       The following portions of the patient's history were reviewed and updated as appropriate: allergies, current medications, past family history, past medical history, past social history, past surgical history and problem list.    Review of Systems   Respiratory: Negative for chest tightness and shortness of breath.    Cardiovascular: Negative for chest pain.   Musculoskeletal: Positive for back pain.   Neurological: Positive for tingling, weakness and numbness.       Objective     Vitals:    06/16/22 1243   BP: 144/80   Weight: 113 kg (250 lb)   Height: 172.7 cm (68\")     Body mass index is 38.01 kg/m².      Physical Exam  Neurologic Exam    Physical Exam:    CONSTITUTIONAL:  appears well developed, well-nourished and in no acute distress.    NECK: the neck is supple and symmetric. The trachea is midline with no masses.      PULMONARY: Respiratory effort is normal with " no increased work of breathing or signs of respiratory distress.    CARDIOVASCULAR: Pedal pulses are +2/4 bilaterally. Examination of the extremities shows no edema or varicosities.    MUSCULOSKELETAL: Gait wide-based    SKIN: The skin is warm, dry and intact.  Lumbar incision healed    NEUROLOGIC:   Normal motor strength noted. Muscle bulk and tone are normal.  Sensory exam is normal to fine touch in both legs  Straight leg raising test is negative bilaterally with some buttock pain on the left with straight leg raising.  Cortical function is intact and without deficits. Speech is normal.    PSYCHIATRIC: oriented to person, place and time. Patient's mood and affect are normal.    Assessment & Plan   Independent Review of Radiographic Studies:      I personally reviewed the images from the following studies.    Lumbar radiographs with flexion-extension views done on May 10, 2022 reveal the pedicle screw fixation at L4-5 with the stable appearance of the spondylolisthesis at that level of 7 to 8 mm which does not change with flexion extension.    MRI of the lumbar spine with and without contrast done on June 12, 2022 reveals a stable appearance when compared to the previous postoperative MRI in 2020.  Evidence of the wide laminectomy at L4-5 with a fusion across the L4-5 disc base with bilateral pedicle screws and interbody fusion.  There is some epidural fibrosis with scar tissue surrounding the L5 nerve roots bilaterally but there is only mild to moderate left and only mild right neuroforaminal narrowing residual and stable compared to the previous postoperative study.  There does not appear to be any significant adjacent level disease.    Medical Decision Making:      Given the stable appearance of the MRI of the lumbar spine with the solid instrumented fusion at L4-5 there is no role for any additional operative intervention.  I suggested we enlist the help of the pain management specialist to see if  interventional pain management may be able to control her symptoms in the long run.    No follow-ups on file.    Diagnoses and all orders for this visit:    1. Chronic midline low back pain with left-sided sciatica (Primary)  -     Ambulatory Referral to Pain Management    2. Spondylolisthesis of lumbar region  -     Ambulatory Referral to Pain Management    3. History of lumbar fusion  -     Ambulatory Referral to Pain Management             Harshad Madera MD FACS FAANS  Neurological Surgery

## 2022-06-12 ENCOUNTER — HOSPITAL ENCOUNTER (OUTPATIENT)
Dept: MRI IMAGING | Facility: HOSPITAL | Age: 62
Discharge: HOME | End: 2022-06-12
Admitting: NEUROLOGICAL SURGERY

## 2022-06-12 PROCEDURE — 72158 MRI LUMBAR SPINE W/O & W/DYE: CPT

## 2022-06-12 PROCEDURE — A9577 INJ MULTIHANCE: HCPCS | Performed by: NEUROLOGICAL SURGERY

## 2022-06-12 PROCEDURE — 0 GADOBENATE DIMEGLUMINE 529 MG/ML SOLUTION: Performed by: NEUROLOGICAL SURGERY

## 2022-06-12 RX ADMIN — GADOBENATE DIMEGLUMINE 20 ML: 529 INJECTION, SOLUTION INTRAVENOUS at 11:59

## 2022-06-16 ENCOUNTER — OFFICE VISIT (OUTPATIENT)
Dept: NEUROSURGERY | Facility: CLINIC | Age: 62
End: 2022-06-16

## 2022-06-16 VITALS
WEIGHT: 250 LBS | HEIGHT: 68 IN | BODY MASS INDEX: 37.89 KG/M2 | SYSTOLIC BLOOD PRESSURE: 144 MMHG | DIASTOLIC BLOOD PRESSURE: 80 MMHG

## 2022-06-16 DIAGNOSIS — G89.29 CHRONIC MIDLINE LOW BACK PAIN WITH LEFT-SIDED SCIATICA: Primary | ICD-10-CM

## 2022-06-16 DIAGNOSIS — M43.16 SPONDYLOLISTHESIS OF LUMBAR REGION: ICD-10-CM

## 2022-06-16 DIAGNOSIS — M54.42 CHRONIC MIDLINE LOW BACK PAIN WITH LEFT-SIDED SCIATICA: Primary | ICD-10-CM

## 2022-06-16 DIAGNOSIS — Z98.1 HISTORY OF LUMBAR FUSION: ICD-10-CM

## 2022-06-16 PROCEDURE — 99213 OFFICE O/P EST LOW 20 MIN: CPT | Performed by: NEUROLOGICAL SURGERY

## 2022-08-09 DIAGNOSIS — I10 ESSENTIAL HYPERTENSION: ICD-10-CM

## 2022-08-09 DIAGNOSIS — E13.9 LATENT AUTOIMMUNE DIABETES IN ADULTS (LADA), MANAGED AS TYPE 1: ICD-10-CM

## 2022-08-09 RX ORDER — LISINOPRIL 20 MG/1
TABLET ORAL
Qty: 60 TABLET | Refills: 0 | OUTPATIENT
Start: 2022-08-09

## 2022-11-08 DIAGNOSIS — E13.9 LATENT AUTOIMMUNE DIABETES IN ADULTS (LADA), MANAGED AS TYPE 1: ICD-10-CM

## 2022-11-08 DIAGNOSIS — K21.9 GASTROESOPHAGEAL REFLUX DISEASE: ICD-10-CM

## 2022-11-08 DIAGNOSIS — I25.10 CHRONIC CORONARY ARTERY DISEASE: ICD-10-CM

## 2022-11-08 DIAGNOSIS — I10 ESSENTIAL HYPERTENSION: ICD-10-CM

## 2022-11-08 RX ORDER — LISINOPRIL 40 MG/1
TABLET ORAL
Qty: 90 TABLET | Refills: 1 | Status: SHIPPED | OUTPATIENT
Start: 2022-11-08 | End: 2023-02-07 | Stop reason: SDUPTHER

## 2022-11-08 RX ORDER — PANTOPRAZOLE SODIUM 40 MG/1
TABLET, DELAYED RELEASE ORAL
Qty: 90 TABLET | Refills: 1 | Status: SHIPPED | OUTPATIENT
Start: 2022-11-08 | End: 2023-02-07 | Stop reason: SDUPTHER

## 2022-11-08 RX ORDER — CARVEDILOL 3.12 MG/1
TABLET ORAL
Qty: 180 TABLET | Refills: 1 | Status: SHIPPED | OUTPATIENT
Start: 2022-11-08 | End: 2023-02-07 | Stop reason: SDUPTHER

## 2023-01-17 DIAGNOSIS — E78.5 HYPERLIPIDEMIA, UNSPECIFIED HYPERLIPIDEMIA TYPE: ICD-10-CM

## 2023-01-17 DIAGNOSIS — I25.10 CHRONIC CORONARY ARTERY DISEASE: ICD-10-CM

## 2023-01-17 DIAGNOSIS — E13.9 LATENT AUTOIMMUNE DIABETES IN ADULTS (LADA), MANAGED AS TYPE 1: ICD-10-CM

## 2023-01-17 DIAGNOSIS — M48.061 SPINAL STENOSIS OF LUMBAR REGION, UNSPECIFIED WHETHER NEUROGENIC CLAUDICATION PRESENT: ICD-10-CM

## 2023-01-17 RX ORDER — ATORVASTATIN CALCIUM 40 MG/1
TABLET, FILM COATED ORAL
Qty: 30 TABLET | Refills: 0 | OUTPATIENT
Start: 2023-01-17

## 2023-01-17 RX ORDER — CYCLOBENZAPRINE HCL 10 MG
TABLET ORAL
Qty: 30 TABLET | Refills: 0 | OUTPATIENT
Start: 2023-01-17

## 2023-01-17 NOTE — TELEPHONE ENCOUNTER
Sobeida/ Dr Moctezuma, this patient just moved to [place] and did set up a f/u with dr moctezuma for Feb 1 at 2p. Regarding labs, she is asking if they can be ordered so she can just come to town next week and get them through outpatient. Also can she still have her atorvastatin sent in for the time being? (it an another med was denied) And notified if the case? Lastly she intends on asking dr Moctezuma if she can recommend her someone to see in [place] for the future.

## 2023-01-17 NOTE — TELEPHONE ENCOUNTER
Rx Refill Note  Requested Prescriptions     Pending Prescriptions Disp Refills    atorvastatin (LIPITOR) 40 MG tablet [Pharmacy Med Name: ATORVASTATIN 40 MG TABLET] 90 tablet 1     Sig: TAKE ONE TABLET BY MOUTH ONCE NIGHTLY    cyclobenzaprine (FLEXERIL) 10 MG tablet [Pharmacy Med Name: CYCLOBENZAPRINE 10 MG TABLET] 30 tablet 0     Sig: TAKE ONE TABLET BY MOUTH TWICE A DAY AS NEEDED FOR MUSCLE SPASMS      Last office visit with prescribing clinician: 5/6/2022   Last telemedicine visit with prescribing clinician: Visit date not found   Next office visit with prescribing clinician: Visit date not found                         Would you like a call back once the refill request has been completed: [] Yes [] No    If the office needs to give you a call back, can they leave a voicemail: [] Yes [] No    Corine Conrad MA  01/17/23, 12:46 EST

## 2023-01-18 DIAGNOSIS — E13.9 LATENT AUTOIMMUNE DIABETES IN ADULTS (LADA), MANAGED AS TYPE 1: ICD-10-CM

## 2023-01-18 DIAGNOSIS — I25.10 CHRONIC CORONARY ARTERY DISEASE: ICD-10-CM

## 2023-01-18 DIAGNOSIS — E78.5 HYPERLIPIDEMIA, UNSPECIFIED HYPERLIPIDEMIA TYPE: ICD-10-CM

## 2023-01-18 RX ORDER — ATORVASTATIN CALCIUM 40 MG/1
40 TABLET, FILM COATED ORAL NIGHTLY
Qty: 30 TABLET | Refills: 0 | Status: SHIPPED | OUTPATIENT
Start: 2023-01-18 | End: 2023-02-22 | Stop reason: SDUPTHER

## 2023-01-18 NOTE — TELEPHONE ENCOUNTER
Pt is scheduled for 2/1 at 2pm. She does not feel that she can come fasting to that appt so she would like to have labs done at the hospital the week prior. Can you place labs orders to be done at the hospital please?

## 2023-01-18 NOTE — TELEPHONE ENCOUNTER
I sent in a 30d script on lipitor 40mg. She may get her labs at visit with Dr. Duckworth, please have her fasting.

## 2023-02-07 DIAGNOSIS — K21.9 GASTROESOPHAGEAL REFLUX DISEASE: ICD-10-CM

## 2023-02-07 DIAGNOSIS — I25.10 CHRONIC CORONARY ARTERY DISEASE: ICD-10-CM

## 2023-02-07 DIAGNOSIS — E13.9 LATENT AUTOIMMUNE DIABETES IN ADULTS (LADA), MANAGED AS TYPE 1: ICD-10-CM

## 2023-02-07 DIAGNOSIS — I10 ESSENTIAL HYPERTENSION: ICD-10-CM

## 2023-02-07 RX ORDER — CARVEDILOL 3.12 MG/1
3.12 TABLET ORAL 2 TIMES DAILY
Qty: 60 TABLET | Refills: 0 | Status: SHIPPED | OUTPATIENT
Start: 2023-02-07 | End: 2023-03-06 | Stop reason: SDUPTHER

## 2023-02-07 RX ORDER — PANTOPRAZOLE SODIUM 40 MG/1
40 TABLET, DELAYED RELEASE ORAL DAILY
Qty: 30 TABLET | Refills: 0 | Status: SHIPPED | OUTPATIENT
Start: 2023-02-07 | End: 2023-03-06 | Stop reason: SDUPTHER

## 2023-02-07 RX ORDER — LISINOPRIL 40 MG/1
40 TABLET ORAL DAILY
Qty: 30 TABLET | Refills: 0 | Status: SHIPPED | OUTPATIENT
Start: 2023-02-07 | End: 2023-03-06 | Stop reason: SDUPTHER

## 2023-02-07 NOTE — TELEPHONE ENCOUNTER
Caller: Kristine Rivas    Relationship: Self    Best call back number: [phone]  Requested Prescriptions:   Requested Prescriptions     Pending Prescriptions Disp Refills   • lisinopril (PRINIVIL,ZESTRIL) 40 MG tablet 90 tablet 1     Sig: Take 1 tablet by mouth Daily.   • carvedilol (COREG) 3.125 MG tablet 180 tablet 1     Sig: Take 1 tablet by mouth 2 (Two) Times a Day.   • pantoprazole (PROTONIX) 40 MG EC tablet 90 tablet 1     Sig: Take 1 tablet by mouth Daily.        Pharmacy where request should be sent:     [facility]  [id number] [place] [address]  [phone]    Additional details provided by patient: WILL NEED A NEW PRESCRIPTION SENT TO THE PHARMACY  [facility] A NEW PHARM[facility]Y FOR THE PATIENT.  HAS NO REFILLS LEFT ON THE MEDICATION     Does the patient have less than a 3 day supply:  [x] Yes  [] No    Would you like a call back once the refill request has been completed: [x] Yes [] No    If the office needs to give you a call back, can they leave a voicemail: [x] Yes [] No    Neil Manuel Rep   02/07/23 08:42 EST

## 2023-02-07 NOTE — TELEPHONE ENCOUNTER
Rx Refill Note  Requested Prescriptions     Pending Prescriptions Disp Refills    lisinopril (PRINIVIL,ZESTRIL) 40 MG tablet 90 tablet 1     Sig: Take 1 tablet by mouth Daily.    carvedilol (COREG) 3.125 MG tablet 180 tablet 1     Sig: Take 1 tablet by mouth 2 (Two) Times a Day.    pantoprazole (PROTONIX) 40 MG EC tablet 90 tablet 1     Sig: Take 1 tablet by mouth Daily.      Last office visit with prescribing clinician: 5/6/2022   Last telemedicine visit with prescribing clinician: 3/3/2023   Next office visit with prescribing clinician: 3/3/2023                         Would you like a call back once the refill request has been completed: [] Yes [] No    If the office needs to give you a call back, can they leave a voicemail: [] Yes [] No    Tonny Keys, PCT  02/07/23, 10:46 EST

## 2023-02-22 ENCOUNTER — TELEPHONE (OUTPATIENT)
Dept: INTERNAL MEDICINE | Facility: CLINIC | Age: 63
End: 2023-02-22

## 2023-02-22 DIAGNOSIS — I25.10 CHRONIC CORONARY ARTERY DISEASE: ICD-10-CM

## 2023-02-22 DIAGNOSIS — I10 ESSENTIAL HYPERTENSION: ICD-10-CM

## 2023-02-22 DIAGNOSIS — E13.9 LATENT AUTOIMMUNE DIABETES IN ADULTS (LADA), MANAGED AS TYPE 1: ICD-10-CM

## 2023-02-22 DIAGNOSIS — E78.5 HYPERLIPIDEMIA, UNSPECIFIED HYPERLIPIDEMIA TYPE: ICD-10-CM

## 2023-02-22 DIAGNOSIS — E10.65 UNCONTROLLED TYPE 1 DIABETES MELLITUS WITH HYPERGLYCEMIA: ICD-10-CM

## 2023-02-22 DIAGNOSIS — E78.5 HYPERLIPIDEMIA, UNSPECIFIED HYPERLIPIDEMIA TYPE: Primary | ICD-10-CM

## 2023-02-22 RX ORDER — ATORVASTATIN CALCIUM 40 MG/1
40 TABLET, FILM COATED ORAL NIGHTLY
Qty: 30 TABLET | Refills: 0 | Status: SHIPPED | OUTPATIENT
Start: 2023-02-22 | End: 2023-03-06 | Stop reason: SDUPTHER

## 2023-02-22 NOTE — TELEPHONE ENCOUNTER
Caller: Kristine Rivas    Relationship: Self    Best call back number: [phone]    What orders are you requesting (i.e. lab or imaging): LABS     Where will you receive your lab/imaging services: [facility]    Additional notes: PATIENT IS REQUESTING LAB ORDERS TO BE ENTERED TO COMPLETE PRIOR TO NEXT APPOINTMENT. PLEASE NOTIFY PATIENT THAT ORDERS ARE IN.

## 2023-02-22 NOTE — TELEPHONE ENCOUNTER
Rx Refill Note  Requested Prescriptions     Pending Prescriptions Disp Refills    atorvastatin (LIPITOR) 40 MG tablet 30 tablet 0     Sig: Take 1 tablet by mouth Every Night.      Last office visit with prescribing clinician: 5/6/2022   Last telemedicine visit with prescribing clinician: 3/3/2023   Next office visit with prescribing clinician: 3/3/2023                         Would you like a call back once the refill request has been completed: [] Yes [] No    If the office needs to give you a call back, can they leave a voicemail: [] Yes [] No    Angelina Ruth MA  02/22/23, 10:59 EST

## 2023-02-22 NOTE — TELEPHONE ENCOUNTER
Caller: Kristine Rivas    Relationship: Self    Best call back number: [phone]  Requested Prescriptions:   Requested Prescriptions     Pending Prescriptions Disp Refills   • atorvastatin (LIPITOR) 40 MG tablet 30 tablet 0     Sig: Take 1 tablet by mouth Every Night.        Pharmacy where request should be sent: [facility] [id number] [place] [id number] [facility] [address] - [phone]  - [phone] FX     Additional details provided by patient: PATIENT IS OUT OF MEDICATION    Does the patient have less than a 3 day supply:  [x] Yes  [] No    Would you like a call back once the refill request has been completed: [x] Yes [] No    If the office needs to give you a call back, can they leave a voicemail: [x] Yes [] No    Neil Christine Rep   02/22/23 10:45 EST

## 2023-02-26 ENCOUNTER — LAB (OUTPATIENT)
Dept: LAB | Facility: HOSPITAL | Age: 63
End: 2023-02-26
Payer: COMMERCIAL

## 2023-02-26 LAB
ALBUMIN SERPL-MCNC: 4.3 G/DL (ref 3.5–5.2)
ALBUMIN UR-MCNC: 2.1 MG/DL
ALBUMIN/GLOB SERPL: 1.5 G/DL
ALP SERPL-CCNC: 110 U/L (ref 39–117)
ALT SERPL W P-5'-P-CCNC: 22 U/L (ref 1–33)
ANION GAP SERPL CALCULATED.3IONS-SCNC: 8.9 MMOL/L (ref 5–15)
AST SERPL-CCNC: 18 U/L (ref 1–32)
BASOPHILS # BLD AUTO: 0.03 10*3/MM3 (ref 0–0.2)
BASOPHILS NFR BLD AUTO: 0.4 % (ref 0–1.5)
BILIRUB SERPL-MCNC: 0.2 MG/DL (ref 0–1.2)
BUN SERPL-MCNC: 12 MG/DL (ref 8–23)
BUN/CREAT SERPL: 14.5 (ref 7–25)
CALCIUM SPEC-SCNC: 8.9 MG/DL (ref 8.6–10.5)
CHLORIDE SERPL-SCNC: 102 MMOL/L (ref 98–107)
CHOLEST SERPL-MCNC: 230 MG/DL (ref 0–200)
CO2 SERPL-SCNC: 28.1 MMOL/L (ref 22–29)
CREAT SERPL-MCNC: 0.83 MG/DL (ref 0.57–1)
CREAT UR-MCNC: 143.2 MG/DL
DEPRECATED RDW RBC AUTO: 42.5 FL (ref 37–54)
EGFRCR SERPLBLD CKD-EPI 2021: 79.3 ML/MIN/1.73
EOSINOPHIL # BLD AUTO: 0.43 10*3/MM3 (ref 0–0.4)
EOSINOPHIL NFR BLD AUTO: 5.2 % (ref 0.3–6.2)
ERYTHROCYTE [DISTWIDTH] IN BLOOD BY AUTOMATED COUNT: 13.8 % (ref 12.3–15.4)
GLOBULIN UR ELPH-MCNC: 2.9 GM/DL
GLUCOSE SERPL-MCNC: 136 MG/DL (ref 65–99)
HBA1C MFR BLD: 8.5 % (ref 4.8–5.6)
HCT VFR BLD AUTO: 43.1 % (ref 34–46.6)
HDLC SERPL-MCNC: 44 MG/DL (ref 40–60)
HGB BLD-MCNC: 13.4 G/DL (ref 12–15.9)
IMM GRANULOCYTES # BLD AUTO: 0.02 10*3/MM3 (ref 0–0.05)
IMM GRANULOCYTES NFR BLD AUTO: 0.2 % (ref 0–0.5)
LDLC SERPL CALC-MCNC: 144 MG/DL (ref 0–100)
LDLC/HDLC SERPL: 3.19 {RATIO}
LYMPHOCYTES # BLD AUTO: 2.71 10*3/MM3 (ref 0.7–3.1)
LYMPHOCYTES NFR BLD AUTO: 32.7 % (ref 19.6–45.3)
MCH RBC QN AUTO: 26.5 PG (ref 26.6–33)
MCHC RBC AUTO-ENTMCNC: 31.1 G/DL (ref 31.5–35.7)
MCV RBC AUTO: 85.2 FL (ref 79–97)
MICROALBUMIN/CREAT UR: 14.7 MG/G
MONOCYTES # BLD AUTO: 0.51 10*3/MM3 (ref 0.1–0.9)
MONOCYTES NFR BLD AUTO: 6.1 % (ref 5–12)
NEUTROPHILS NFR BLD AUTO: 4.6 10*3/MM3 (ref 1.7–7)
NEUTROPHILS NFR BLD AUTO: 55.4 % (ref 42.7–76)
NRBC BLD AUTO-RTO: 0 /100 WBC (ref 0–0.2)
PLATELET # BLD AUTO: 320 10*3/MM3 (ref 140–450)
PMV BLD AUTO: 10.8 FL (ref 6–12)
POTASSIUM SERPL-SCNC: 4.4 MMOL/L (ref 3.5–5.2)
PROT SERPL-MCNC: 7.2 G/DL (ref 6–8.5)
RBC # BLD AUTO: 5.06 10*6/MM3 (ref 3.77–5.28)
SODIUM SERPL-SCNC: 139 MMOL/L (ref 136–145)
T4 FREE SERPL-MCNC: 1.1 NG/DL (ref 0.93–1.7)
TRIGL SERPL-MCNC: 229 MG/DL (ref 0–150)
TSH SERPL DL<=0.05 MIU/L-ACNC: 1.4 UIU/ML (ref 0.27–4.2)
VLDLC SERPL-MCNC: 42 MG/DL (ref 5–40)
WBC NRBC COR # BLD: 8.3 10*3/MM3 (ref 3.4–10.8)

## 2023-02-26 PROCEDURE — 80050 GENERAL HEALTH PANEL: CPT | Performed by: INTERNAL MEDICINE

## 2023-02-26 PROCEDURE — 82570 ASSAY OF URINE CREATININE: CPT | Performed by: INTERNAL MEDICINE

## 2023-02-26 PROCEDURE — 83036 HEMOGLOBIN GLYCOSYLATED A1C: CPT | Performed by: INTERNAL MEDICINE

## 2023-02-26 PROCEDURE — 36415 COLL VENOUS BLD VENIPUNCTURE: CPT | Performed by: INTERNAL MEDICINE

## 2023-02-26 PROCEDURE — 82043 UR ALBUMIN QUANTITATIVE: CPT | Performed by: INTERNAL MEDICINE

## 2023-02-26 PROCEDURE — 84439 ASSAY OF FREE THYROXINE: CPT | Performed by: INTERNAL MEDICINE

## 2023-02-26 PROCEDURE — 80061 LIPID PANEL: CPT | Performed by: INTERNAL MEDICINE

## 2023-03-06 ENCOUNTER — TELEMEDICINE (OUTPATIENT)
Dept: INTERNAL MEDICINE | Facility: CLINIC | Age: 63
End: 2023-03-06
Payer: COMMERCIAL

## 2023-03-06 VITALS
WEIGHT: 250 LBS | SYSTOLIC BLOOD PRESSURE: 130 MMHG | DIASTOLIC BLOOD PRESSURE: 74 MMHG | TEMPERATURE: 98.7 F | BODY MASS INDEX: 37.89 KG/M2 | HEIGHT: 68 IN

## 2023-03-06 DIAGNOSIS — M48.061 SPINAL STENOSIS OF LUMBAR REGION, UNSPECIFIED WHETHER NEUROGENIC CLAUDICATION PRESENT: ICD-10-CM

## 2023-03-06 DIAGNOSIS — Z12.11 COLON CANCER SCREENING: ICD-10-CM

## 2023-03-06 DIAGNOSIS — I10 ESSENTIAL HYPERTENSION: ICD-10-CM

## 2023-03-06 DIAGNOSIS — K21.9 GASTROESOPHAGEAL REFLUX DISEASE: ICD-10-CM

## 2023-03-06 DIAGNOSIS — Z12.31 ENCOUNTER FOR SCREENING MAMMOGRAM FOR MALIGNANT NEOPLASM OF BREAST: ICD-10-CM

## 2023-03-06 DIAGNOSIS — I25.10 CHRONIC CORONARY ARTERY DISEASE: ICD-10-CM

## 2023-03-06 DIAGNOSIS — E13.9 LATENT AUTOIMMUNE DIABETES IN ADULTS (LADA), MANAGED AS TYPE 1: Primary | ICD-10-CM

## 2023-03-06 DIAGNOSIS — E78.5 HYPERLIPIDEMIA, UNSPECIFIED HYPERLIPIDEMIA TYPE: ICD-10-CM

## 2023-03-06 PROCEDURE — 99214 OFFICE O/P EST MOD 30 MIN: CPT | Performed by: INTERNAL MEDICINE

## 2023-03-06 RX ORDER — PANTOPRAZOLE SODIUM 40 MG/1
40 TABLET, DELAYED RELEASE ORAL DAILY
Qty: 90 TABLET | Refills: 1 | Status: SHIPPED | OUTPATIENT
Start: 2023-03-06

## 2023-03-06 RX ORDER — CARVEDILOL 3.12 MG/1
3.12 TABLET ORAL 2 TIMES DAILY
Qty: 180 TABLET | Refills: 1 | Status: SHIPPED | OUTPATIENT
Start: 2023-03-06

## 2023-03-06 RX ORDER — LISINOPRIL 40 MG/1
40 TABLET ORAL DAILY
Qty: 90 TABLET | Refills: 1 | Status: SHIPPED | OUTPATIENT
Start: 2023-03-06

## 2023-03-06 RX ORDER — ATORVASTATIN CALCIUM 40 MG/1
40 TABLET, FILM COATED ORAL NIGHTLY
Qty: 90 TABLET | Refills: 1 | Status: SHIPPED | OUTPATIENT
Start: 2023-03-06

## 2023-03-06 RX ORDER — CYCLOBENZAPRINE HCL 10 MG
10 TABLET ORAL 2 TIMES DAILY PRN
Qty: 30 TABLET | Refills: 1 | Status: SHIPPED | OUTPATIENT
Start: 2023-03-06

## 2023-03-06 RX ORDER — DULOXETIN HYDROCHLORIDE 30 MG/1
30 CAPSULE, DELAYED RELEASE ORAL DAILY
Qty: 30 CAPSULE | Refills: 0 | Status: SHIPPED | OUTPATIENT
Start: 2023-03-06

## 2023-03-06 NOTE — PROGRESS NOTES
Kristine Rivas is a 63 y.o. female, who presents with a chief complaint of   Chief Complaint   Patient presents with   • Follow-up     F/u labs  She is needing disability tag for car.. longer than 3 mo?  Back permanent damage.. affects her walking           HPI   This visit has been scheduled as a telehealth visit to comply with patient safety concerns in accordance with CDC recommendations. This was an audio and video enabled telemedicine encounter.    You have chosen to receive care through a televisit visit. Do you consent to use a televisit visit for your medical care today? Yes    Pt is located at home and I am in the office    Pt has scheduled appt today to discuss getting a handicap tag for her car.      chronic back pain - Pt follows with Dr. Madera.  She had a L4/5 laminectomy with posterior fusion and posterior pedicle screw fixation on 08/21/19.  she feels like she has never truly recovered from this surgery.  She has continues to have some back pain and left leg weakness/numbness.  She was referred to pain mgt but she cant afford this right now.  She doesn't tolerate gabapentin bc of side effects.  She takes flexeril PRN.  She doesn't want to be on narcotics.      Latent autoimmune diabetes - a1c 8.9->8.5.   She can walk about 10 minutes but stops bc of chronic back pain. She takes novolin r/n bc of cost.  she had to move with 2 weeks notice.  Her am glucoses have been in the low 100s.  she takes 18-20 units morning and afternoon and then 40-50 units insulin at night.  she occ has low glucoses but she doesn't eat regularly. Since moving she is feeling better and taking better care of herself.     HTN - no ha/dizziness.  On lisinopril and carvedilol     HLD - on statin.  no ccramps or myalgia.      She needs to get UTD on her HM.       2 of her sisters have had breast cancer.  mammogam normal.  Due for c-scope.  No family hx colon cancer. No personal hx of inflammatory bowel disease.              The following portions of the patient's history were reviewed and updated as appropriate: allergies, current medications, past family history, past medical history, past social history, past surgical history and problem list.    Allergies: Patient has no known allergies.    Review of Systems   Constitutional: Negative.    HENT: Negative.    Eyes: Negative.    Respiratory: Negative.    Cardiovascular: Negative.    Gastrointestinal: Negative.    Endocrine: Negative.    Genitourinary: Negative.    Musculoskeletal: Negative.    Skin: Negative.    Allergic/Immunologic: Negative.    Neurological: Negative.    Hematological: Negative.    Psychiatric/Behavioral: Negative.    All other systems reviewed and are negative.            Wt Readings from Last 3 Encounters:   03/06/23 113 kg (250 lb)   06/16/22 113 kg (250 lb)   05/12/22 114 kg (251 lb)     Temp Readings from Last 3 Encounters:   03/06/23 98.7 °F (37.1 °C)   05/12/22 97.3 °F (36.3 °C)   05/06/22 96.9 °F (36.1 °C) (Tympanic)     BP Readings from Last 3 Encounters:   03/06/23 130/74   06/16/22 144/80   05/12/22 154/88     Pulse Readings from Last 3 Encounters:   05/12/22 67   05/06/22 62   10/04/21 87     Body mass index is 38.02 kg/m².  SpO2 Readings from Last 3 Encounters:   05/12/22 97%   05/06/22 98%   10/04/21 99%          Physical Exam  Vitals and nursing note reviewed.   Constitutional:       Appearance: She is well-developed.   HENT:      Head: Normocephalic and atraumatic.      Nose: Nose normal.   Eyes:      General:         Right eye: No discharge.         Left eye: No discharge.      Conjunctiva/sclera: Conjunctivae normal.   Pulmonary:      Effort: Pulmonary effort is normal. No respiratory distress.   Musculoskeletal:      Cervical back: Normal range of motion and neck supple.   Skin:     Findings: No rash.   Neurological:      General: No focal deficit present.      Mental Status: She is alert and oriented to person, place, and time.    Psychiatric:         Mood and Affect: Mood normal.         Behavior: Behavior normal.         Thought Content: Thought content normal.         Judgment: Judgment normal.         Results for orders placed or performed in visit on 02/22/23   Comprehensive Metabolic Panel    Specimen: Blood   Result Value Ref Range    Glucose 136 (H) 65 - 99 mg/dL    BUN 12 8 - 23 mg/dL    Creatinine 0.83 0.57 - 1.00 mg/dL    Sodium 139 136 - 145 mmol/L    Potassium 4.4 3.5 - 5.2 mmol/L    Chloride 102 98 - 107 mmol/L    CO2 28.1 22.0 - 29.0 mmol/L    Calcium 8.9 8.6 - 10.5 mg/dL    Total Protein 7.2 6.0 - 8.5 g/dL    Albumin 4.3 3.5 - 5.2 g/dL    ALT (SGPT) 22 1 - 33 U/L    AST (SGOT) 18 1 - 32 U/L    Alkaline Phosphatase 110 39 - 117 U/L    Total Bilirubin 0.2 0.0 - 1.2 mg/dL    Globulin 2.9 gm/dL    A/G Ratio 1.5 g/dL    BUN/Creatinine Ratio 14.5 7.0 - 25.0    Anion Gap 8.9 5.0 - 15.0 mmol/L    eGFR 79.3 >60.0 mL/min/1.73   Hemoglobin A1c    Specimen: Blood   Result Value Ref Range    Hemoglobin A1C 8.50 (H) 4.80 - 5.60 %   Lipid Panel With LDL / HDL Ratio    Specimen: Blood   Result Value Ref Range    Total Cholesterol 230 (H) 0 - 200 mg/dL    Triglycerides 229 (H) 0 - 150 mg/dL    HDL Cholesterol 44 40 - 60 mg/dL    LDL Cholesterol  144 (H) 0 - 100 mg/dL    VLDL Cholesterol 42 (H) 5 - 40 mg/dL    LDL/HDL Ratio 3.19    T4, Free    Specimen: Blood   Result Value Ref Range    Free T4 1.10 0.93 - 1.70 ng/dL   TSH    Specimen: Blood   Result Value Ref Range    TSH 1.400 0.270 - 4.200 uIU/mL   Microalbumin / Creatinine Urine Ratio - Urine, Clean Catch    Specimen: Urine, Clean Catch   Result Value Ref Range    Microalbumin/Creatinine Ratio 14.7 mg/g    Creatinine, Urine 143.2 mg/dL    Microalbumin, Urine 2.1 mg/dL   CBC Auto Differential    Specimen: Blood   Result Value Ref Range    WBC 8.30 3.40 - 10.80 10*3/mm3    RBC 5.06 3.77 - 5.28 10*6/mm3    Hemoglobin 13.4 12.0 - 15.9 g/dL    Hematocrit 43.1 34.0 - 46.6 %    MCV 85.2 79.0 -  97.0 fL    MCH 26.5 (L) 26.6 - 33.0 pg    MCHC 31.1 (L) 31.5 - 35.7 g/dL    RDW 13.8 12.3 - 15.4 %    RDW-SD 42.5 37.0 - 54.0 fl    MPV 10.8 6.0 - 12.0 fL    Platelets 320 140 - 450 10*3/mm3    Neutrophil % 55.4 42.7 - 76.0 %    Lymphocyte % 32.7 19.6 - 45.3 %    Monocyte % 6.1 5.0 - 12.0 %    Eosinophil % 5.2 0.3 - 6.2 %    Basophil % 0.4 0.0 - 1.5 %    Immature Grans % 0.2 0.0 - 0.5 %    Neutrophils, Absolute 4.60 1.70 - 7.00 10*3/mm3    Lymphocytes, Absolute 2.71 0.70 - 3.10 10*3/mm3    Monocytes, Absolute 0.51 0.10 - 0.90 10*3/mm3    Eosinophils, Absolute 0.43 (H) 0.00 - 0.40 10*3/mm3    Basophils, Absolute 0.03 0.00 - 0.20 10*3/mm3    Immature Grans, Absolute 0.02 0.00 - 0.05 10*3/mm3    nRBC 0.0 0.0 - 0.2 /100 WBC     Result Review :                  Assessment and Plan    Diagnoses and all orders for this visit:    1. 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 Chronic coronary artery disease  -     Comprehensive Metabolic Panel; Future  -     Lipid Panel With LDL / HDL Ratio; Future    3. Hyperlipidemia, unspecified hyperlipidemia type  -     Comprehensive Metabolic Panel; Future  -     Lipid Panel With LDL / HDL Ratio; Future    4. Essential hypertension  -     CBC & Differential; Future  -     Comprehensive Metabolic Panel; Future  -     Hemoglobin A1c; Future  -     Lipid Panel With LDL / HDL Ratio; Future    5. Spinal stenosis of lumbar region, unspecified whether neurogenic claudication present  -     cyclobenzaprine (FLEXERIL) 10 MG tablet; Take 1 tablet by mouth 2 (Two) Times a Day As Needed for Muscle Spasms.  Dispense: 30 tablet; Refill: 1  -     DULoxetine (CYMBALTA) 30 MG capsule; Take 1 capsule by mouth Daily.  Dispense: 30 capsule; Refill: 0    6. Colon cancer screening  -     Cologuard - Stool, Per  Rectum; Future    7. Encounter for screening mammogram for malignant neoplasm of breast  -     Mammo screening digital tomosynthesis bilateral w CAD; Future         Class 2 Severe Obesity (BMI >=35 and <=39.9). Obesity-related health conditions include the following: hypertension, coronary heart disease, diabetes mellitus and dyslipidemias. Obesity is unchanged. BMI is is above average; no BMI management plan is appropriate. We discussed portion control and increasing exercise.             Outpatient Medications Prior to Visit   Medication Sig Dispense Refill   • acetaminophen (TYLENOL) 500 MG tablet Take 2 tablets by mouth Every 6 (Six) Hours As Needed for Mild Pain.     • aspirin 81 MG EC tablet Take 1 tablet by mouth Daily. 90 tablet 3   • atorvastatin (LIPITOR) 40 MG tablet Take 1 tablet by mouth Every Night. 30 tablet 0   • carvedilol (COREG) 3.125 MG tablet Take 1 tablet by mouth 2 (Two) Times a Day. 60 tablet 0   • glucose blood (WaveSense Presto) test strip 1 each by Other route 4 (Four) Times a Day. use 1 strip to test 4 times a day 360 each 4   • insulin NPH (humuLIN N,novoLIN N) 100 UNIT/ML injection Inject 32 Units under the skin into the appropriate area as directed Every Night. 9 each 2   • insulin regular (humuLIN R,novoLIN R) 100 UNIT/ML injection Inject 18 Units under the skin into the appropriate area as directed 3 (Three) Times a Day Before Meals. 15-20 units at breakfast 15-20 units at lunch 25-45 units at dinner 9 each 2   • lisinopril (PRINIVIL,ZESTRIL) 40 MG tablet Take 1 tablet by mouth Daily. 30 tablet 0   • meclizine (ANTIVERT) 25 MG tablet Take 1 tablet by mouth Every 6 (Six) Hours As Needed for dizziness. 20 tablet 0   • melatonin 5 MG tablet tablet Take 1 tablet by mouth Every Night.     • pantoprazole (PROTONIX) 40 MG EC tablet Take 1 tablet by mouth Daily. 30 tablet 0   • Vitamins-Lipotropics (LIPO-FLAVONOID PLUS PO) Take  by mouth.     • cyclobenzaprine (FLEXERIL) 10 MG tablet TAKE  ONE TABLET BY MOUTH TWICE A DAY AS NEEDED FOR MUSCLE SPASMS 30 tablet 0     No facility-administered medications prior to visit.     New Medications Ordered This Visit   Medications   • cyclobenzaprine (FLEXERIL) 10 MG tablet     Sig: Take 1 tablet by mouth 2 (Two) Times a Day As Needed for Muscle Spasms.     Dispense:  30 tablet     Refill:  1   • DULoxetine (CYMBALTA) 30 MG capsule     Sig: Take 1 capsule by mouth Daily.     Dispense:  30 capsule     Refill:  0     [unfilled]  Medications Discontinued During This Encounter   Medication Reason   • cyclobenzaprine (FLEXERIL) 10 MG tablet Reorder         Return in about 3 months (around 6/6/2023) for Recheck, labs.    Patient was given instructions and counseling regarding her condition or for health maintenance advice. Please see specific information pulled into the AVS if appropriate.

## 2023-08-23 NOTE — ED NOTES
8/23/2023        RE: Elzbieta Ivan  [address]  [place] [address]        [facility] GERIATRICS    Chief Complaint   Patient presents with     RECHECK     HPI:  Elzbieta Ivan is a 80 year old  (1942), who is being seen today for an episodic care visit at: [facility]) [[id number]].   She has lived at this facility since 6/2022, initially in AL then moved to Memory Care 12/2022. We assumed primary care 5/2023.   Medical history significant for dementia, major depression, anxiety, suicidal ideation, anemia, GERD, CAD, hyperlipidemia, Hashimoto's,  essential tremor, fibromyalgia, osteoarthritis, osteoporosis.   She was hospitalized on the [facility] 11/28-12/6/2022 with suicidal ideation and a plan. Cymbalta was decreased from 120 mg daily to 90 mg due to potential side effects of mood instability and irritability. Xanax was dc'd. Mirtazapine and prn seroquel were started. She returned to the facility and moved to Memory Care for a higher level of care.   She was seen by Dr Giang at [facility] 3/7/2023 and rivastigmine was started. Note indicates that Neuropsych testing showed memory markedly affected,  executive abilities moderately affected, verbal processing mild to moderately affected, nonverbal processing minimally affected. Findings consistent with Alzheimer's disease.       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 She doesn't want to live on the Memory Care unit and wants to be able to go  Pt heard wretching again, emesis present in bag.  Pt stated she threw up her meclizine.  Dr Campbell informed.  New orders received          Monse Thompson, RN  09/10/19 1911     "outside by herself, \" I feel trapped. I can't be here for the rest of my life.\" Reports she is \"surrounded by helplessness and loss\" in reference to other residents on the unit. She is higher functioning than the other residents and feels that she should help them, which is stressful. Also states that she is \"terribly worried\" about her sister, who has pancreatic cancer. Denies feeling ill. Sleeping well. Good appetite. Denies pain. Ambulates without a device. Independent with cares.   Spoke with her sister/POA Bacilio Ivan, who reports delusions and agitation have improved with seroquel. Patient has told her that she feels \"listless\" in the mornings, but no specific physical symptoms.       Allergies, and PMH/PSH reviewed in EPIC today    REVIEW OF SYSTEMS:  Limited due to dementia. Positives as noted under HPI    Objective:   /80   Pulse 84   Temp 96.9  F (36.1  C)   Resp 20   Wt 58.7 kg (129 lb 8 oz)   BMI 25.29 kg/m    GENERAL APPEARANCE:  Alert, in no distress  ENT:  [place], oropharynx clear  EYES:  conjunctiva and lids normal  NECK:  no adenopathy, no thyromegaly  RESP:  lungs clear to auscultation  CV:  regular rate and rhythm, no murmur,  no edema  ABDOMEN:  soft, non-tender, no distension, no masses  M/S:   gait steady. EVANGELISTA with good strength. No joint inflammation   SKIN:  no rashes or open areas  PSYCH:  oriented to self, place, situation, insight and judgement impaired, memory impaired, affect normal     Recent labs in [facility] reviewed by me today.     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 Discussed with her sister Bacilio Ivan, who has a good understanding of the situation. She would like patient to have more freedom, but agrees she's not safe to leave the unit unattended. " We discussed getting a private , which she will look into.   Plan: continue seroquel, mirtazapine, duloxetine. Memory Care staff assistance with cares, meals, activities, med admin. Encourage activities and socialization.   Discussed with staff.     (F33.41) Recurrent major depressive disorder, in partial remission (H)  Comment: long standing depression and anxiety, now compounded by dementia and loss of coping skills  Plan: continue duloxetine, mirtazapine, seroquel    (I25.10) Coronary artery disease involving native heart without angina pectoris, unspecified vessel or lesion type  Comment: asymptomatic   Extensive abdominal aortic calcification on DEXA 2020.   Plan: continue ASA, statin     (M15.9) Osteoarthritis of multiple joints, unspecified osteoarthritis type  Comment: no significant pain   Plan: continue tylenol prn     (M81.0) Age-related osteoporosis without current pathological fracture  Comment: remains ambulatory, no falls   Plan: continue vitamin D, dietary calcium         Total time spent in patient visit at the AL facility was 62 minutes with >50% of total time spent in counseling and coordinating care, including patient visit, review of past records and 33 min phone care with her sister. Counseling sister re: nature of dementia, medications and potential side effects, management of depression, plan of care. Coordinating plan of care with facility staff.         Electronically signed by: ENID Taylor CNP         Sincerely,        ENID Taylor CNP

## 2023-09-14 DIAGNOSIS — K21.9 GASTROESOPHAGEAL REFLUX DISEASE: ICD-10-CM

## 2023-09-14 DIAGNOSIS — I25.10 CHRONIC CORONARY ARTERY DISEASE: ICD-10-CM

## 2023-09-14 DIAGNOSIS — I10 ESSENTIAL HYPERTENSION: ICD-10-CM

## 2023-09-14 DIAGNOSIS — E13.9 LATENT AUTOIMMUNE DIABETES IN ADULTS (LADA), MANAGED AS TYPE 1: ICD-10-CM

## 2023-09-15 RX ORDER — PANTOPRAZOLE SODIUM 40 MG/1
TABLET, DELAYED RELEASE ORAL
Qty: 90 TABLET | Refills: 1 | Status: SHIPPED | OUTPATIENT
Start: 2023-09-15

## 2023-09-15 RX ORDER — LISINOPRIL 40 MG/1
TABLET ORAL
Qty: 90 TABLET | Refills: 1 | Status: SHIPPED | OUTPATIENT
Start: 2023-09-15

## 2023-09-15 RX ORDER — CARVEDILOL 3.12 MG/1
TABLET ORAL
Qty: 180 TABLET | Refills: 1 | Status: SHIPPED | OUTPATIENT
Start: 2023-09-15

## 2023-09-19 ENCOUNTER — TELEPHONE (OUTPATIENT)
Dept: INTERNAL MEDICINE | Facility: CLINIC | Age: 63
End: 2023-09-19
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
## 2023-10-18 ENCOUNTER — LAB (OUTPATIENT)
Dept: LAB | Facility: HOSPITAL | Age: 63
End: 2023-10-18
Payer: COMMERCIAL

## 2023-10-18 PROCEDURE — 85025 COMPLETE CBC W/AUTO DIFF WBC: CPT | Performed by: INTERNAL MEDICINE

## 2023-10-18 PROCEDURE — 80053 COMPREHEN METABOLIC PANEL: CPT | Performed by: INTERNAL MEDICINE

## 2023-10-18 PROCEDURE — 83036 HEMOGLOBIN GLYCOSYLATED A1C: CPT | Performed by: INTERNAL MEDICINE

## 2023-10-18 PROCEDURE — 80061 LIPID PANEL: CPT | Performed by: INTERNAL MEDICINE

## 2023-10-19 ENCOUNTER — TELEPHONE (OUTPATIENT)
Dept: INTERNAL MEDICINE | Facility: CLINIC | Age: 63
End: 2023-10-19
Payer: COMMERCIAL

## 2023-10-19 NOTE — TELEPHONE ENCOUNTER
Name: ALINE SHER  Relationship: PATIENT  Best Callback Number:   HUB PROVIDED THE RELAY MESSAGE FROM THE OFFICE  PATIENT {CHOOSE THE CORRECT RESULT:  ADDITIONAL INFORMATION:I HAVE READ THE RELAY MESSAGE TO THE PATIENT AND WILL SCHEDULE HER A OFFICE VISIT

## 2023-11-03 ENCOUNTER — TELEMEDICINE (OUTPATIENT)
Dept: INTERNAL MEDICINE | Facility: CLINIC | Age: 63
End: 2023-11-03
Payer: COMMERCIAL

## 2023-11-03 VITALS — BODY MASS INDEX: 37.89 KG/M2 | WEIGHT: 250 LBS | HEIGHT: 68 IN

## 2023-11-03 DIAGNOSIS — K21.9 GASTROESOPHAGEAL REFLUX DISEASE: ICD-10-CM

## 2023-11-03 DIAGNOSIS — I10 ESSENTIAL HYPERTENSION: ICD-10-CM

## 2023-11-03 DIAGNOSIS — E13.9 LATENT AUTOIMMUNE DIABETES IN ADULTS (LADA), MANAGED AS TYPE 1: ICD-10-CM

## 2023-11-03 DIAGNOSIS — M48.061 SPINAL STENOSIS OF LUMBAR REGION, UNSPECIFIED WHETHER NEUROGENIC CLAUDICATION PRESENT: ICD-10-CM

## 2023-11-03 DIAGNOSIS — I25.10 CHRONIC CORONARY ARTERY DISEASE: ICD-10-CM

## 2023-11-03 DIAGNOSIS — E78.5 HYPERLIPIDEMIA, UNSPECIFIED HYPERLIPIDEMIA TYPE: ICD-10-CM

## 2023-11-03 RX ORDER — CYCLOBENZAPRINE HCL 10 MG
10 TABLET ORAL 2 TIMES DAILY PRN
Qty: 30 TABLET | Refills: 0 | Status: SHIPPED | OUTPATIENT
Start: 2023-11-03

## 2023-11-03 RX ORDER — LISINOPRIL 40 MG/1
40 TABLET ORAL DAILY
Qty: 90 TABLET | Refills: 1 | Status: SHIPPED | OUTPATIENT
Start: 2023-11-03

## 2023-11-03 RX ORDER — PANTOPRAZOLE SODIUM 40 MG/1
40 TABLET, DELAYED RELEASE ORAL DAILY
Qty: 90 TABLET | Refills: 1 | Status: SHIPPED | OUTPATIENT
Start: 2023-11-03

## 2023-11-03 RX ORDER — CARVEDILOL 3.12 MG/1
3.12 TABLET ORAL 2 TIMES DAILY
Qty: 180 TABLET | Refills: 1 | Status: SHIPPED | OUTPATIENT
Start: 2023-11-03

## 2023-11-03 RX ORDER — ATORVASTATIN CALCIUM 40 MG/1
40 TABLET, FILM COATED ORAL NIGHTLY
Qty: 90 TABLET | Refills: 1 | Status: SHIPPED | OUTPATIENT
Start: 2023-11-03
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
## 2024-05-16 ENCOUNTER — TELEPHONE (OUTPATIENT)
Dept: INTERNAL MEDICINE | Facility: CLINIC | Age: 64
End: 2024-05-16

## 2024-05-16 DIAGNOSIS — I25.10 CHRONIC CORONARY ARTERY DISEASE: ICD-10-CM

## 2024-05-16 DIAGNOSIS — E13.9 LATENT AUTOIMMUNE DIABETES IN ADULTS (LADA), MANAGED AS TYPE 1: ICD-10-CM

## 2024-05-16 DIAGNOSIS — E78.5 HYPERLIPIDEMIA, UNSPECIFIED HYPERLIPIDEMIA TYPE: ICD-10-CM

## 2024-05-16 RX ORDER — ATORVASTATIN CALCIUM 40 MG/1
40 TABLET, FILM COATED ORAL NIGHTLY
Qty: 90 TABLET | Refills: 1 | Status: SHIPPED | OUTPATIENT
Start: 2024-05-16

## 2024-05-16 NOTE — TELEPHONE ENCOUNTER
Printed lab orders off and will have Dr. Duckworth sign the orders when she is back in the office tomorrow.

## 2024-05-16 NOTE — TELEPHONE ENCOUNTER
Rx Refill Note  Requested Prescriptions     Pending Prescriptions Disp Refills    atorvastatin (LIPITOR) 40 MG tablet [Pharmacy Med Name: ATORVASTATIN 40 MG TABLET] 90 tablet 1     Sig: TAKE ONE TABLET BY MOUTH ONCE NIGHTLY      Last office visit with prescribing clinician: Visit date not found   Last telemedicine visit with prescribing clinician: Visit date not found   Next office visit with prescribing clinician: Visit date not found                         Would you like a call back once the refill request has been completed: [] Yes [] No    If the office needs to give you a call back, can they leave a voicemail: [] Yes [] No    Alida Galindo MA  05/16/24, 10:10 EDT

## 2024-05-16 NOTE — TELEPHONE ENCOUNTER
Rx Refill Note  Requested Prescriptions     Pending Prescriptions Disp Refills    atorvastatin (LIPITOR) 40 MG tablet [Pharmacy Med Name: ATORVASTATIN 40 MG TABLET] 90 tablet 1     Sig: TAKE ONE TABLET BY MOUTH ONCE NIGHTLY      Last office visit with prescribing clinician: Visit date not found   Last telemedicine visit with prescribing clinician: Visit date not found   Next office visit with prescribing clinician: Visit date not found                         Would you like a call back once the refill request has been completed: [] Yes [] No    If the office needs to give you a call back, can they leave a voicemail: [] Yes [] No    Alida Galindo MA  05/16/24, 10:07 EDT

## 2024-05-16 NOTE — TELEPHONE ENCOUNTER
Caller: Kristine Rivas    Relationship: Self    Best call back number: [phone]     What was the call regarding: PATIENT CALLING BACK WITH FAX NUMBER FOR [facility] TO SEND LAB ORDERS TO    FAX: [phone]

## 2024-06-19 ENCOUNTER — TELEMEDICINE (OUTPATIENT)
Dept: INTERNAL MEDICINE | Facility: CLINIC | Age: 64
End: 2024-06-19
Payer: COMMERCIAL

## 2024-06-19 VITALS — BODY MASS INDEX: 36.37 KG/M2 | WEIGHT: 240 LBS | HEIGHT: 68 IN

## 2024-06-19 DIAGNOSIS — I10 ESSENTIAL HYPERTENSION: ICD-10-CM

## 2024-06-19 DIAGNOSIS — M48.061 SPINAL STENOSIS OF LUMBAR REGION, UNSPECIFIED WHETHER NEUROGENIC CLAUDICATION PRESENT: ICD-10-CM

## 2024-06-19 DIAGNOSIS — Z12.11 COLON CANCER SCREENING: Primary | ICD-10-CM

## 2024-06-19 DIAGNOSIS — I25.10 CHRONIC CORONARY ARTERY DISEASE: ICD-10-CM

## 2024-06-19 DIAGNOSIS — E13.9 LATENT AUTOIMMUNE DIABETES IN ADULTS (LADA), MANAGED AS TYPE 1: ICD-10-CM

## 2024-06-19 DIAGNOSIS — E78.5 HYPERLIPIDEMIA, UNSPECIFIED HYPERLIPIDEMIA TYPE: ICD-10-CM

## 2024-06-19 DIAGNOSIS — Z12.31 ENCOUNTER FOR SCREENING MAMMOGRAM FOR BREAST CANCER: ICD-10-CM

## 2024-06-19 PROCEDURE — 99214 OFFICE O/P EST MOD 30 MIN: CPT | Performed by: INTERNAL MEDICINE

## 2024-06-19 RX ORDER — CYCLOBENZAPRINE HCL 10 MG
10 TABLET ORAL 2 TIMES DAILY PRN
Qty: 30 TABLET | Refills: 0 | Status: SHIPPED | OUTPATIENT
Start: 2024-06-19

## 2024-06-19 NOTE — PROGRESS NOTES
Kristine Rivas is a 64 y.o. female, who presents with a chief complaint of   Chief Complaint   Patient presents with    Uncontrolled type 1 diabetes mellitus with hyperglycemia     Follow up           HPI   This visit has been scheduled as a telehealth visit to comply with patient safety concerns in accordance with CDC recommendations. This was an audio and video enabled telemedicine encounter.    You have chosen to receive care through a televisit visit. Do you consent to use a televisit visit for your medical care today? Yes    Pt is at home and I am in the office. She has moved again since her last OV.  She lives with her son and daughter.      Pt here for f/u.      She just had covid and has been sick the past week.  She had lots of congestion and aches but no soa/trouble breathingShe did not take paxlovid.    Latent autoimmune diabetes - a1c 8.9->8.5->8.6->8.3.   In the afternoon her glucoses are higher.  She snacks in the afternoon bc they don't eat dinner until about 9pm.  She is on insulin r/n from [facility].  She takes short acting insulin R 18-20 units morning and afternoon and then 40-50 units insulin at night.  she takes insulin N 28-30 units at night.  she tries to stay active but her back limits her walking some.  In a little over a year she will go on Medicare and be eddei to afford her insulin.      HTN - no ha/dizziness.  On lisinopril and carvedilol     HLD - on atorvastatin.  no ccramps or myalgia.      GERD - on Pantoprazole    She needs to get UTD on her HM.       2 of her sisters have had breast cancer.  Last mammogram normal and due for next scan.        cologuard due.  No family hx colon cancer. No personal hx of inflammatory bowel disease.    Pap overdue by 4 years    Due for eye exam - she needs new glasses and her dad had glaucoma.      The following portions of the patient's history were reviewed and updated as appropriate: allergies, current medications, past family history, past  medical history, past social history, past surgical history and problem list.    Allergies: Patient has no known allergies.    Review of Systems   Constitutional: Negative.    HENT: Negative.     Eyes: Negative.    Respiratory: Negative.     Cardiovascular: Negative.    Gastrointestinal: Negative.    Endocrine: Negative.    Genitourinary: Negative.    Musculoskeletal: Negative.    Skin: Negative.    Allergic/Immunologic: Negative.    Neurological: Negative.    Hematological: Negative.    Psychiatric/Behavioral: Negative.     All other systems reviewed and are negative.            Wt Readings from Last 3 Encounters:   06/19/24 109 kg (240 lb)   11/03/23 113 kg (250 lb)   03/06/23 113 kg (250 lb)     Temp Readings from Last 3 Encounters:   03/06/23 98.7 °F (37.1 °C)   05/12/22 97.3 °F (36.3 °C)   05/06/22 96.9 °F (36.1 °C) (Tympanic)     BP Readings from Last 3 Encounters:   03/06/23 130/74   06/16/22 144/80   05/12/22 154/88     Pulse Readings from Last 3 Encounters:   05/12/22 67   05/06/22 62   10/04/21 87     Body mass index is 36.5 kg/m².  SpO2 Readings from Last 3 Encounters:   05/12/22 97%   05/06/22 98%   10/04/21 99%          Physical Exam  Vitals and nursing note reviewed.   Constitutional:       Appearance: She is well-developed.   HENT:      Head: Normocephalic and atraumatic.      Nose: Nose normal.   Eyes:      General:         Right eye: No discharge.         Left eye: No discharge.      Conjunctiva/sclera: Conjunctivae normal.   Pulmonary:      Effort: Pulmonary effort is normal. No respiratory distress.   Musculoskeletal:      Cervical back: Normal range of motion and neck supple.   Skin:     Findings: No rash.   Neurological:      Mental Status: She is alert and oriented to person, place, and time.   Psychiatric:         Behavior: Behavior normal.         Thought Content: Thought content normal.         Judgment: Judgment normal.         5/29/24 - labs from [facility] scanned in media  tab  Cholesterol - 230 -> 211 -> 152  LDL - 144-> 115->103  HDL 42->44->55    A1c 8.3    Results for orders placed or performed in visit on 06/04/23   Comprehensive Metabolic Panel    Specimen: Blood   Result Value Ref Range    Glucose 150 (H) 65 - 99 mg/dL    BUN 11 8 - 23 mg/dL    Creatinine 0.80 0.57 - 1.00 mg/dL    Sodium 141 136 - 145 mmol/L    Potassium 4.3 3.5 - 5.2 mmol/L    Chloride 101 98 - 107 mmol/L    CO2 30.0 (H) 22.0 - 29.0 mmol/L    Calcium 9.6 8.6 - 10.5 mg/dL    Total Protein 7.0 6.0 - 8.5 g/dL    Albumin 4.4 3.5 - 5.2 g/dL    ALT (SGPT) 20 1 - 33 U/L    AST (SGOT) 18 1 - 32 U/L    Alkaline Phosphatase 104 39 - 117 U/L    Total Bilirubin 0.2 0.0 - 1.2 mg/dL    Globulin 2.6 gm/dL    A/G Ratio 1.7 g/dL    BUN/Creatinine Ratio 13.8 7.0 - 25.0    Anion Gap 10.0 5.0 - 15.0 mmol/L    eGFR 82.9 >60.0 mL/min/1.73   Hemoglobin A1c    Specimen: Blood   Result Value Ref Range    Hemoglobin A1C 8.60 (H) 4.80 - 5.60 %   Lipid Panel With LDL / HDL Ratio    Specimen: Blood   Result Value Ref Range    Total Cholesterol 211 (H) 0 - 200 mg/dL    Triglycerides 252 (H) 0 - 150 mg/dL    HDL Cholesterol 52 40 - 60 mg/dL    LDL Cholesterol  115 (H) 0 - 100 mg/dL    VLDL Cholesterol 44 (H) 5 - 40 mg/dL    LDL/HDL Ratio 2.09    CBC Auto Differential    Specimen: Blood   Result Value Ref Range    WBC 8.27 3.40 - 10.80 10*3/mm3    RBC 4.87 3.77 - 5.28 10*6/mm3    Hemoglobin 13.6 12.0 - 15.9 g/dL    Hematocrit 41.4 34.0 - 46.6 %    MCV 85.0 79.0 - 97.0 fL    MCH 27.9 26.6 - 33.0 pg    MCHC 32.9 31.5 - 35.7 g/dL    RDW 13.6 12.3 - 15.4 %    RDW-SD 41.9 37.0 - 54.0 fl    MPV 11.4 6.0 - 12.0 fL    Platelets 284 140 - 450 10*3/mm3    Neutrophil % 50.7 42.7 - 76.0 %    Lymphocyte % 35.2 19.6 - 45.3 %    Monocyte % 6.7 5.0 - 12.0 %    Eosinophil % 6.5 (H) 0.3 - 6.2 %    Basophil % 0.5 0.0 - 1.5 %    Immature Grans % 0.4 0.0 - 0.5 %    Neutrophils, Absolute 4.20 1.70 - 7.00 10*3/mm3    Lymphocytes, Absolute 2.91 0.70 - 3.10 10*3/mm3     Monocytes, Absolute 0.55 0.10 - 0.90 10*3/mm3    Eosinophils, Absolute 0.54 (H) 0.00 - 0.40 10*3/mm3    Basophils, Absolute 0.04 0.00 - 0.20 10*3/mm3    Immature Grans, Absolute 0.03 0.00 - 0.05 10*3/mm3    nRBC 0.1 0.0 - 0.2 /100 WBC     Result Review :                  Assessment and Plan    Diagnoses and all orders for this visit:    1. Colon cancer screening (Primary)  -     Cologuard - Stool, Per Rectum; Future    2. Spinal stenosis of lumbar region, unspecified whether neurogenic claudication present  -     cyclobenzaprine (FLEXERIL) 10 MG tablet; Take 1 tablet by mouth 2 (Two) Times a Day As Needed for Muscle Spasms.  Dispense: 30 tablet; Refill: 0    3. 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  increase long acting insulin and split dosing to bid.   -     CBC & Differential; Future  -     Comprehensive Metabolic Panel; Future  -     Hemoglobin A1c; Future  -     Lipid Panel With LDL / HDL Ratio; Future  -     Microalbumin / Creatinine Urine Ratio - Urine, Clean Catch; Future  -     T4, Free; Future  -     TSH; Future    4. Encounter for screening mammogram for breast cancer  -     Mammo Screening Digital Tomosynthesis Bilateral With CAD; Future    5. Chronic coronary artery disease    6. Hyperlipidemia, unspecified hyperlipidemia type  -     Comprehensive Metabolic Panel; Future  -     Lipid Panel With LDL / HDL Ratio; Future    7. Essential hypertension  -     CBC & Differential; Future  -     Comprehensive Metabolic Panel; Future  -     Hemoglobin A1c; Future  -     Lipid Panel With LDL / HDL Ratio; Future  -     Microalbumin / Creatinine Urine Ratio - Urine, Clean Catch; Future  -     T4, Free; Future  -     TSH; Future         Class 2 Severe Obesity (BMI >=35 and <=39.9). Obesity-related health conditions include the following: hypertension, diabetes mellitus, and dyslipidemias. Obesity is unchanged. BMI is is above average; no BMI management plan is appropriate.  We discussed portion control and increasing exercise.             Outpatient Medications Prior to Visit   Medication Sig Dispense Refill    acetaminophen (TYLENOL) 500 MG tablet Take 2 tablets by mouth Every 6 (Six) Hours As Needed for Mild Pain.      aspirin 81 MG EC tablet Take 1 tablet by mouth Daily. 90 tablet 3    atorvastatin (LIPITOR) 40 MG tablet TAKE ONE TABLET BY MOUTH ONCE NIGHTLY 90 tablet 1    carvedilol (COREG) 3.125 MG tablet Take 1 tablet by mouth 2 (Two) Times a Day. 180 tablet 1    glucose blood (WaveSense Presto) test strip 1 each by Other route 4 (Four) Times a Day. use 1 strip to test 4 times a day 360 each 4    insulin NPH (humuLIN N,novoLIN N) 100 UNIT/ML injection Inject 32 Units under the skin into the appropriate area as directed Every Night. 9 each 2    insulin regular (humuLIN R,novoLIN R) 100 UNIT/ML injection Inject 18 Units under the skin into the appropriate area as directed 3 (Three) Times a Day Before Meals. 15-20 units at breakfast 15-20 units at lunch 25-45 units at dinner 9 each 2    lisinopril (PRINIVIL,ZESTRIL) 40 MG tablet Take 1 tablet by mouth Daily. 90 tablet 1    meclizine (ANTIVERT) 25 MG tablet Take 1 tablet by mouth Every 6 (Six) Hours As Needed for dizziness. 20 tablet 0    Melatonin 10 MG tablet Take 1 tablet by mouth Every Night.      pantoprazole (PROTONIX) 40 MG EC tablet Take 1 tablet by mouth Daily. 90 tablet 1    Vitamins-Lipotropics (LIPO-FLAVONOID PLUS PO) Take  by mouth. (Patient not taking: Reported on 6/19/2024)      cyclobenzaprine (FLEXERIL) 10 MG tablet Take 1 tablet by mouth 2 (Two) Times a Day As Needed for Muscle Spasms. (Patient not taking: Reported on 6/19/2024) 30 tablet 0    DULoxetine (CYMBALTA) 30 MG capsule Take 1 capsule by mouth Daily. 30 capsule 0     No facility-administered medications prior to visit.     New Medications Ordered This Visit   Medications    cyclobenzaprine (FLEXERIL) 10 MG tablet     Sig: Take 1 tablet by mouth 2 (Two)  Times a Day As Needed for Muscle Spasms.     Dispense:  30 tablet     Refill:  0     [unfilled]  Medications Discontinued During This Encounter   Medication Reason    cyclobenzaprine (FLEXERIL) 10 MG tablet Reorder    DULoxetine (CYMBALTA) 30 MG capsule *Therapy completed         Return in about 3 months (around 9/19/2024) for Annual physical, labs, Recheck.    Patient was given instructions and counseling regarding her condition or for health maintenance advice. Please see specific information pulled into the AVS if appropriate.

## 2024-07-16 DIAGNOSIS — R19.5 POSITIVE COLORECTAL CANCER SCREENING USING COLOGUARD TEST: Primary | ICD-10-CM

## 2024-09-14 DIAGNOSIS — K21.9 GASTROESOPHAGEAL REFLUX DISEASE: ICD-10-CM

## 2024-09-14 DIAGNOSIS — I25.10 CHRONIC CORONARY ARTERY DISEASE: ICD-10-CM

## 2024-09-14 DIAGNOSIS — I10 ESSENTIAL HYPERTENSION: ICD-10-CM

## 2024-09-14 DIAGNOSIS — E13.9 LATENT AUTOIMMUNE DIABETES IN ADULTS (LADA), MANAGED AS TYPE 1: ICD-10-CM

## 2024-09-19 RX ORDER — PANTOPRAZOLE SODIUM 40 MG/1
40 TABLET, DELAYED RELEASE ORAL DAILY
Qty: 90 TABLET | Refills: 1 | Status: SHIPPED | OUTPATIENT
Start: 2024-09-19

## 2024-09-19 RX ORDER — LISINOPRIL 40 MG/1
40 TABLET ORAL DAILY
Qty: 90 TABLET | Refills: 1 | Status: SHIPPED | OUTPATIENT
Start: 2024-09-19

## 2024-09-19 RX ORDER — CARVEDILOL 3.12 MG/1
3.12 TABLET ORAL 2 TIMES DAILY
Qty: 180 TABLET | Refills: 1 | Status: SHIPPED | OUTPATIENT
Start: 2024-09-19

## 2024-11-12 DIAGNOSIS — E13.9 LATENT AUTOIMMUNE DIABETES IN ADULTS (LADA), MANAGED AS TYPE 1: ICD-10-CM

## 2024-11-12 DIAGNOSIS — I25.10 CHRONIC CORONARY ARTERY DISEASE: ICD-10-CM

## 2024-11-12 DIAGNOSIS — E78.5 HYPERLIPIDEMIA, UNSPECIFIED HYPERLIPIDEMIA TYPE: ICD-10-CM

## 2024-11-15 RX ORDER — ATORVASTATIN CALCIUM 40 MG/1
40 TABLET, FILM COATED ORAL NIGHTLY
Qty: 30 TABLET | Refills: 0 | Status: SHIPPED | OUTPATIENT
Start: 2024-11-15

## 2024-11-15 NOTE — TELEPHONE ENCOUNTER
Rx Refill Note  Requested Prescriptions     Pending Prescriptions Disp Refills    atorvastatin (LIPITOR) 40 MG tablet [Pharmacy Med Name: ATORVASTATIN 40 MG TABLET] 90 tablet 1     Sig: TAKE ONE TABLET BY MOUTH ONCE NIGHTLY      Last office visit with prescribing clinician: Visit date not found   Last telemedicine visit with prescribing clinician: 6/19/2024   Next office visit with prescribing clinician: Visit date not found                         Would you like a call back once the refill request has been completed: [] Yes [] No    If the office needs to give you a call back, can they leave a voicemail: [] Yes [] No    Jayna Kwan  11/15/24, 18:41 EST

## 2024-11-21 ENCOUNTER — TELEPHONE (OUTPATIENT)
Dept: INTERNAL MEDICINE | Facility: CLINIC | Age: 64
End: 2024-11-21

## 2024-11-21 NOTE — TELEPHONE ENCOUNTER
Caller: Kristine Rivas    Relationship: Self    Best call back number: [phone]     What orders are you requesting (i.e. lab or imaging): LABS    In what timeframe would the patient need to come in: ASAP    Where will [facility] receive your lab[facility]imaging [facility]: [facility]  [address], [place] [address]  PHONE NUMBER: [phone]      Additional notes  PATIENT IS WITHOUT RELIABLE TRANSPORTATION AND WOULD LIKE TO GET THE LABS COMPLETED AT FACILITY LISTED ABOVE. PLEASE CALL WHEN ORDERS HAVE BEEN FAXED.

## 2024-12-16 DIAGNOSIS — E13.9 LATENT AUTOIMMUNE DIABETES IN ADULTS (LADA), MANAGED AS TYPE 1: ICD-10-CM

## 2024-12-16 DIAGNOSIS — I25.10 CHRONIC CORONARY ARTERY DISEASE: ICD-10-CM

## 2024-12-16 DIAGNOSIS — E78.5 HYPERLIPIDEMIA, UNSPECIFIED HYPERLIPIDEMIA TYPE: ICD-10-CM

## 2024-12-19 NOTE — TELEPHONE ENCOUNTER
[facility]-CoA Reductase Inhibitors (Statins) Protocol [id number]/16/2024 06:11 PM   Protocol Details Lipid panel in past 12 months    ALK Phos in past 12 months    ALT in past 12 months    AST in past 12 months   Rx Refill Note  Requested Prescriptions     Pending Prescriptions Disp Refills    atorvastatin (LIPITOR) 40 MG tablet [Pharmacy Med Name: ATORVASTATIN 40 MG TABLET] 30 tablet 0     Sig: TAKE ONE TABLET BY MOUTH ONCE NIGHTLY      Last office visit with prescribing clinician: Visit date not found   Last telemedicine visit with prescribing clinician: 6/19/2024   Next office visit with prescribing clinician: Visit date not found                         Would you like a call back once the refill request has been completed: [] Yes [] No    If the office needs to give you a call back, can they leave a voicemail: [] Yes [] No    Razia Huerta MA  12/19/24, 08:36 EST

## 2024-12-20 RX ORDER — ATORVASTATIN CALCIUM 40 MG/1
40 TABLET, FILM COATED ORAL NIGHTLY
Qty: 30 TABLET | Refills: 0 | Status: SHIPPED | OUTPATIENT
Start: 2024-12-20

## 2025-01-24 DIAGNOSIS — I25.10 CHRONIC CORONARY ARTERY DISEASE: ICD-10-CM

## 2025-01-24 DIAGNOSIS — E78.5 HYPERLIPIDEMIA, UNSPECIFIED HYPERLIPIDEMIA TYPE: ICD-10-CM

## 2025-01-24 DIAGNOSIS — E13.9 LATENT AUTOIMMUNE DIABETES IN ADULTS (LADA), MANAGED AS TYPE 1: ICD-10-CM

## 2025-01-27 RX ORDER — ATORVASTATIN CALCIUM 40 MG/1
40 TABLET, FILM COATED ORAL NIGHTLY
Qty: 30 TABLET | Refills: 0 | Status: SHIPPED | OUTPATIENT
Start: 2025-01-27

## 2025-01-27 NOTE — TELEPHONE ENCOUNTER
[facility]-CoA Reductase Inhibitors (Statins) Protocol [id number]/24/2025 08:23 AM   Protocol Details Lipid panel in past 12 months    ALK Phos in past 12 months    ALT in past 12 months    AST in past 12 months      Rx Refill Note  Requested Prescriptions     Pending Prescriptions Disp Refills    atorvastatin (LIPITOR) 40 MG tablet [Pharmacy Med Name: ATORVASTATIN 40 MG TABLET] 30 tablet 0     Sig: TAKE ONE TABLET BY MOUTH ONCE NIGHTLY      Last office visit with prescribing clinician: Visit date not found   Last telemedicine visit with prescribing clinician: Visit date not found   Next office visit with prescribing clinician: Visit date not found                         Would you like a call back once the refill request has been completed: [] Yes [] No    If the office needs to give you a call back, can they leave a voicemail: [] Yes [] No    Razia Huerta MA  01/27/25, 11:21 EST

## 2025-02-20 ENCOUNTER — OFFICE VISIT (OUTPATIENT)
Dept: FAMILY MEDICINE CLINIC | Facility: CLINIC | Age: 65
End: 2025-02-20
Payer: MEDICARE

## 2025-02-20 VITALS
SYSTOLIC BLOOD PRESSURE: 146 MMHG | HEIGHT: 68 IN | DIASTOLIC BLOOD PRESSURE: 90 MMHG | WEIGHT: 241.1 LBS | BODY MASS INDEX: 36.54 KG/M2 | HEART RATE: 52 BPM | OXYGEN SATURATION: 95 %

## 2025-02-20 DIAGNOSIS — E66.812 CLASS 2 SEVERE OBESITY DUE TO EXCESS CALORIES WITH SERIOUS COMORBIDITY AND BODY MASS INDEX (BMI) OF 36.0 TO 36.9 IN ADULT: ICD-10-CM

## 2025-02-20 DIAGNOSIS — I10 PRIMARY HYPERTENSION: ICD-10-CM

## 2025-02-20 DIAGNOSIS — K21.9 GASTROESOPHAGEAL REFLUX DISEASE: ICD-10-CM

## 2025-02-20 DIAGNOSIS — Z12.31 SCREENING MAMMOGRAM FOR BREAST CANCER: ICD-10-CM

## 2025-02-20 DIAGNOSIS — Z00.00 GENERAL MEDICAL EXAM: Primary | ICD-10-CM

## 2025-02-20 DIAGNOSIS — Z79.899 HIGH RISK MEDICATION USE: ICD-10-CM

## 2025-02-20 DIAGNOSIS — E66.01 CLASS 2 SEVERE OBESITY DUE TO EXCESS CALORIES WITH SERIOUS COMORBIDITY AND BODY MASS INDEX (BMI) OF 36.0 TO 36.9 IN ADULT: ICD-10-CM

## 2025-02-20 DIAGNOSIS — Z23 ENCOUNTER FOR IMMUNIZATION: ICD-10-CM

## 2025-02-20 DIAGNOSIS — E78.2 MIXED HYPERLIPIDEMIA: ICD-10-CM

## 2025-02-20 DIAGNOSIS — N18.32 TYPE 2 DIABETES MELLITUS WITH STAGE 3B CHRONIC KIDNEY DISEASE, WITHOUT LONG-TERM CURRENT USE OF INSULIN: ICD-10-CM

## 2025-02-20 DIAGNOSIS — E11.22 TYPE 2 DIABETES MELLITUS WITH STAGE 3B CHRONIC KIDNEY DISEASE, WITHOUT LONG-TERM CURRENT USE OF INSULIN: ICD-10-CM

## 2025-02-20 DIAGNOSIS — I25.10 CHRONIC CORONARY ARTERY DISEASE: ICD-10-CM

## 2025-02-20 PROBLEM — Z12.11 SCREEN FOR COLON CANCER: Status: RESOLVED | Noted: 2019-06-11 | Resolved: 2025-02-20

## 2025-02-20 PROBLEM — M79.671 FOOT PAIN, RIGHT: Status: RESOLVED | Noted: 2017-07-14 | Resolved: 2025-02-20

## 2025-02-20 PROBLEM — Z83.719 FAMILY HISTORY OF POLYPS IN THE COLON: Status: RESOLVED | Noted: 2019-06-11 | Resolved: 2025-02-20

## 2025-02-20 PROBLEM — E66.9 OBESITY (BMI 30-39.9): Status: RESOLVED | Noted: 2019-08-21 | Resolved: 2025-02-20

## 2025-02-20 LAB
EXPIRATION DATE: ABNORMAL
HBA1C MFR BLD: 9.8 % (ref 4.5–5.7)
Lab: ABNORMAL

## 2025-02-20 PROCEDURE — 1126F AMNT PAIN NOTED NONE PRSNT: CPT | Performed by: PHYSICIAN ASSISTANT

## 2025-02-20 PROCEDURE — 99397 PER PM REEVAL EST PAT 65+ YR: CPT | Performed by: PHYSICIAN ASSISTANT

## 2025-02-20 PROCEDURE — 3046F HEMOGLOBIN A1C LEVEL >9.0%: CPT | Performed by: PHYSICIAN ASSISTANT

## 2025-02-20 PROCEDURE — 83036 HEMOGLOBIN GLYCOSYLATED A1C: CPT | Performed by: PHYSICIAN ASSISTANT

## 2025-02-20 PROCEDURE — 90662 IIV NO PRSV INCREASED AG IM: CPT | Performed by: PHYSICIAN ASSISTANT

## 2025-02-20 PROCEDURE — 3080F DIAST BP >= 90 MM HG: CPT | Performed by: PHYSICIAN ASSISTANT

## 2025-02-20 PROCEDURE — 90677 PCV20 VACCINE IM: CPT | Performed by: PHYSICIAN ASSISTANT

## 2025-02-20 PROCEDURE — 99214 OFFICE O/P EST MOD 30 MIN: CPT | Performed by: PHYSICIAN ASSISTANT

## 2025-02-20 PROCEDURE — G0009 ADMIN PNEUMOCOCCAL VACCINE: HCPCS | Performed by: PHYSICIAN ASSISTANT

## 2025-02-20 PROCEDURE — G0008 ADMIN INFLUENZA VIRUS VAC: HCPCS | Performed by: PHYSICIAN ASSISTANT

## 2025-02-20 PROCEDURE — 3077F SYST BP >= 140 MM HG: CPT | Performed by: PHYSICIAN ASSISTANT

## 2025-02-20 RX ORDER — PANTOPRAZOLE SODIUM 40 MG/1
40 TABLET, DELAYED RELEASE ORAL DAILY
Qty: 90 TABLET | Refills: 0 | Status: SHIPPED | OUTPATIENT
Start: 2025-02-20

## 2025-02-20 RX ORDER — LISINOPRIL AND HYDROCHLOROTHIAZIDE 12.5; 2 MG/1; MG/1
1 TABLET ORAL DAILY
Qty: 180 TABLET | Refills: 0 | Status: SHIPPED | OUTPATIENT
Start: 2025-02-20

## 2025-02-20 RX ORDER — CARVEDILOL 3.12 MG/1
3.12 TABLET ORAL 2 TIMES DAILY
Qty: 180 TABLET | Refills: 0 | Status: SHIPPED | OUTPATIENT
Start: 2025-02-20

## 2025-02-20 RX ORDER — ATORVASTATIN CALCIUM 40 MG/1
40 TABLET, FILM COATED ORAL NIGHTLY
Qty: 90 TABLET | Refills: 0 | Status: SHIPPED | OUTPATIENT
Start: 2025-02-20

## 2025-02-20 NOTE — PROGRESS NOTES
Patient Office Visit      Patient Name: Kristine Rivas  : 1960   MRN: [id number]     Chief Complaint:    Chief Complaint   Patient presents with    Establish Care    Diabetes    Hypertension    Hyperlipidemia       History of Present Illness: Kristine Rivas is a 65 y.o. female who is here today to establish care with a new provider.  Has moved to [place] from [place].  Can no longer get back and forth between [facility] to continue with previous primary care provider.  Patient treat self with over-the-counter insulin.  She first became diabetic when pregnant and said metformin caused diarrhea and did not lower blood sugar so they started her on insulin.  She has been on insulin ever since and has never taken any other systemic diabetes medications.  She has always thought she has type II, there are conflicting diagnosis in her chart.  There is a diagnosis of ANITHA but patient has never heard of this diagnosis.  She has never been able to afford any be the more expensive diabetes medications.  She said previous primary care provider was waiting for her to get on Medicare to switch her to a different regimen.    Subjective      Review of Systems:   Review of Systems   Respiratory:  Negative for shortness of breath.    Cardiovascular:  Negative for chest pain, palpitations and leg swelling.        Past Medical History:   Past Medical History:   Diagnosis Date    AC (acromioclavicular) joint bone spurs     Diabetes mellitus     GERD (gastroesophageal reflux disease)     Herpes zoster     Hyperlipidemia     Hypertension     Myocardial infarction        Past Surgical History:   Past Surgical History:   Procedure Laterality Date    BACK SURGERY      BREAST EXCISIONAL BIOPSY Right     benign    BREAST LUMPECTOMY      BREAST LUMPECTOMY      for benign lump    CARDIAC SURGERY  2014     SECTION      CORONARY ARTERY BYPASS GRAFT      D & C WITH SUCTION      times 3 -miscarriages     "KNEE SURGERY      LUMBAR DISCECTOMY FUSION INSTRUMENTATION Bilateral 8/21/2019    Procedure: Lumbar Laminectomy Lumbar 4 Lumbar 5 with posterior interbody allograft fusion using carbon fiber interbody device and posterior pedicle titanium screw fixation;  Surgeon: Harshad Madera MD;  Location: [facility];  Service: Neurosurgery       Family History:   Family History   Problem Relation Age of Onset    Hypertension Father     Diabetes Father     Glaucoma Father     Hypotension Father     Kidney failure Father     Anxiety disorder Father     Hypertension Sister     Diabetes Sister     Anxiety disorder Sister     Colon polyps Sister     Breast cancer Sister     Osteoporosis Mother     Cataracts Mother     Heart failure Mother     Diverticulitis Mother     Colon polyps Brother     Colon polyps Maternal Uncle     Colon cancer Maternal Uncle     Breast cancer Sister     Diabetes Sister     Heart disease Sister 65        stent at age 65    No Known Problems Sister     Malig Hyperthermia Neg Hx        Social History:   Social History     Socioeconomic History    Marital status: Legally    Tobacco Use    Smoking status: Every Day     Current packs/day: 0.25     Average packs/day: 0.8 packs/day for 19.1 years (16.0 ttl pk-yrs)     Types: Cigarettes     Start date: 7/9/1999     Last attempt to quit: 7/9/2014     Passive exposure: Past    Smokeless tobacco: Never   Vaping Use    Vaping status: Never Used   Substance and Sexual Activity    Alcohol use: Yes     Comment: SOCIALLY    Drug use: No    Sexual activity: Defer       Allergies:   No Known Allergies    Objective     Physical Exam:  Vital Signs:   Vitals:    02/20/25 1300   BP: 146/90   BP Location: Left arm   Patient Position: Sitting   Cuff Size: Adult   Pulse: 52   SpO2: 95%   Weight: 109 kg (241 lb 1.6 oz)   Height: 172.7 cm (67.99\")   PainSc: 0-No pain     Body mass index is 36.67 kg/m².           Physical Exam  Constitutional:       Appearance: " Normal appearance.   Cardiovascular:      Rate and Rhythm: Normal rate and regular rhythm.   Pulmonary:      Effort: Pulmonary effort is normal.      Breath sounds: Normal breath sounds.   Musculoskeletal:      Cervical back: Normal range of motion and neck supple.   Neurological:      Mental Status: She is alert and oriented to person, place, and time.   Psychiatric:         Mood and Affect: Mood normal.         Behavior: Behavior normal.         Thought Content: Thought content normal.         Judgment: Judgment normal.         Procedures    Assessment / Plan      Assessment/Plan:   Diagnoses and all orders for this visit:    1. General medical exam (Primary)  Assessment & Plan:  Will check general labs, screening mammogram ordered.  Had a positive Cologuard last year followed by a colonoscopy at [facility].  Will request copy of report.  Encourage patient to get her shingles vaccine at the pharmacy.  Updated her Prevnar 20 and her influenza vaccinations today.    Orders:  -     POC Glycosylated Hemoglobin (Hb A1C)  -     Microalbumin / Creatinine Urine Ratio - Urine, Clean Catch  -     Fluzone High-Dose 65+yrs  -     Pneumococcal Conjugate Vaccine 20-Valent All  -     Comprehensive Metabolic Panel  -     Vitamin B12  -     Folate  -     Lipid Panel  -     TSH  -     T4, Free  -     CBC Auto Differential  -     CK  -     Mammo Screening Digital Tomosynthesis Bilateral With CAD; Future    2. Primary hypertension  Assessment & Plan:  BP elevated, continue lisinopril 40 but add hydrochlorothiazide 25.  Continue carvedilol.  Dose is limited by her pulse.    Orders:  -     lisinopril-hydrochlorothiazide (PRINZIDE,ZESTORETIC) 20-12.5 MG per tablet; Take 1 tablet by mouth Daily.  Dispense: 180 tablet; Refill: 0  -     carvedilol (COREG) 3.125 MG tablet; Take 1 tablet by mouth 2 (Two) Times a Day.  Dispense: 180 tablet; Refill: 0    3. Type 2 diabetes mellitus with stage 3b chronic kidney disease,  without long-term current use of insulin  Assessment & Plan:  Patient's problem list also shows latent autoimmune diabetes in adults and patient has only been treated with insulin for many years.  I think this is more due to cost.  Patient had never heard that diagnosis.  She is on an old over-the-counter insulin regimen due to cost.  She may be able to afford a better regimen now that she is on Medicare.  She is overweight and really wants to lose weight.  If she is type II I think she would benefit from an injectable GLP-1 but will refer to endocrinology for an opinion on her type of diabetes and to get her established on a more appropriate regimen.  Patient will continue her over-the-counter insulin regimen for now.  Point-of-care A1c today was 9.8.  She needs to continue to monitor her sugars, take a log with her to her endocrinology appointment and work on eating healthier.    Orders:  -     POC Glycosylated Hemoglobin (Hb A1C)  -     Microalbumin / Creatinine Urine Ratio - Urine, Clean Catch  -     Ambulatory Referral to Endocrinology    4. Mixed hyperlipidemia  Assessment & Plan:   Continue atorvastatin and check lipid levels.    Orders:  -     Lipid Panel  -     TSH  -     T4, Free  -     atorvastatin (LIPITOR) 40 MG tablet; Take 1 tablet by mouth Every Night.  Dispense: 90 tablet; Refill: 0    5. Gastroesophageal reflux disease  Assessment & Plan:  Continue pantoprazole.    Orders:  -     pantoprazole (PROTONIX) 40 MG EC tablet; Take 1 tablet by mouth Daily.  Dispense: 90 tablet; Refill: 0    6. Chronic coronary artery disease  Assessment & Plan:  Stable, continue atorvastatin and blood pressure medications.    Orders:  -     atorvastatin (LIPITOR) 40 MG tablet; Take 1 tablet by mouth Every Night.  Dispense: 90 tablet; Refill: 0  -     carvedilol (COREG) 3.125 MG tablet; Take 1 tablet by mouth 2 (Two) Times a Day.  Dispense: 180 tablet; Refill: 0    7. Class 2 severe obesity due to excess calories with  serious comorbidity and body mass index (BMI) of 36.0 to 36.9 in adult  Assessment & Plan:  Patient's (Body mass index is 36.67 kg/m².) indicates that they are morbidly/severely obese (BMI > 40 or > 35 with obesity - related health condition) with health conditions that include hypertension, coronary heart disease, diabetes mellitus, dyslipidemias, and GERD . Weight is unchanged. BMI  is above average; BMI management plan is completed. We discussed low calorie, low carb based diet program, portion control, and increasing exercise.       8. Encounter for immunization  -     Fluzone High-Dose 65+yrs  -     Pneumococcal Conjugate Vaccine 20-Valent All    9. High risk medication use  -     Comprehensive Metabolic Panel  -     Vitamin B12  -     Folate  -     CBC Auto Differential  -     CK    10. Screening mammogram for breast cancer  -     Mammo Screening Digital Tomosynthesis Bilateral With CAD; Future           Medications:     Current Outpatient Medications:     acetaminophen (TYLENOL) 500 MG tablet, Take 2 tablets by mouth Every 6 (Six) Hours As Needed for Mild Pain., Disp: , Rfl:     aspirin 81 MG EC tablet, Take 1 tablet by mouth Daily., Disp: 90 tablet, Rfl: 3    atorvastatin (LIPITOR) 40 MG tablet, Take 1 tablet by mouth Every Night., Disp: 90 tablet, Rfl: 0    carvedilol (COREG) 3.125 MG tablet, Take 1 tablet by mouth 2 (Two) Times a Day., Disp: 180 tablet, Rfl: 0    cyclobenzaprine (FLEXERIL) 10 MG tablet, Take 1 tablet by mouth 2 (Two) Times a Day As Needed for Muscle Spasms., Disp: 30 tablet, Rfl: 0    glucose blood (WaveSense Presto) test strip, 1 each by Other route 4 (Four) Times a Day. use 1 strip to test 4 times a day, Disp: 360 each, Rfl: 4    insulin NPH (humuLIN N,novoLIN N) 100 UNIT/ML injection, Inject 32 Units under the skin into the appropriate area as directed Every Night., Disp: 9 each, Rfl: 2    insulin regular (humuLIN R,novoLIN R) 100 UNIT/ML injection, Inject 18 Units under the skin into  the appropriate area as directed 3 (Three) Times a Day Before Meals. 15-20 units at breakfast 15-20 units at lunch 25-45 units at dinner, Disp: 9 each, Rfl: 2    meclizine (ANTIVERT) 25 MG tablet, Take 1 tablet by mouth Every 6 (Six) Hours As Needed for dizziness., Disp: 20 tablet, Rfl: 0    Melatonin 10 MG tablet, Take 1 tablet by mouth Every Night., Disp: , Rfl:     pantoprazole (PROTONIX) 40 MG EC tablet, Take 1 tablet by mouth Daily., Disp: 90 tablet, Rfl: 0    Vitamins-Lipotropics (LIPO-FLAVONOID PLUS PO), Take  by mouth., Disp: , Rfl:     lisinopril-hydrochlorothiazide (PRINZIDE,ZESTORETIC) 20-12.5 MG per tablet, Take 1 tablet by mouth Daily., Disp: 180 tablet, Rfl: 0        Follow Up:   Return in about 4 weeks (around 3/20/2025) for Medicare Wellness, Recheck.    Theresa Keyes PA-C   [facility]     NOTE TO PATIENT: The 21st Century Cures Act makes medical notes like these available to patients in the interest of transparency. However, be advised this is a medical document. It is intended as peer to peer communication. It is written in medical language and may contain abbreviations or verbiage that are unfamiliar. It may appear blunt or direct. Medical documents are intended to carry relevant information, facts as evident, and the clinical opinion of the practitioner.

## 2025-02-20 NOTE — ASSESSMENT & PLAN NOTE
Will check general labs, screening mammogram ordered.  Had a positive Cologuard last year followed by a colonoscopy at [facility].  Will request copy of report.  Encourage patient to get her shingles vaccine at the pharmacy.  Updated her Prevnar 20 and her influenza vaccinations today.

## 2025-02-20 NOTE — ASSESSMENT & PLAN NOTE
BP elevated, continue lisinopril 40 but add hydrochlorothiazide 25.  Continue carvedilol.  Dose is limited by her pulse.

## 2025-02-20 NOTE — ASSESSMENT & PLAN NOTE
Patient's (Body mass index is 36.67 kg/m².) indicates that they are morbidly/severely obese (BMI > 40 or > 35 with obesity - related health condition) with health conditions that include hypertension, coronary heart disease, diabetes mellitus, dyslipidemias, and GERD . Weight is unchanged. BMI  is above average; BMI management plan is completed. We discussed low calorie, low carb based diet program, portion control, and increasing exercise.

## 2025-02-20 NOTE — ASSESSMENT & PLAN NOTE
Patient's problem list also shows latent autoimmune diabetes in adults and patient has only been treated with insulin for many years.  I think this is more due to cost.  Patient had never heard that diagnosis.  She is on an old over-the-counter insulin regimen due to cost.  She may be able to afford a better regimen now that she is on Medicare.  She is overweight and really wants to lose weight.  If she is type II I think she would benefit from an injectable GLP-1 but will refer to endocrinology for an opinion on her type of diabetes and to get her established on a more appropriate regimen.  Patient will continue her over-the-counter insulin regimen for now.  Point-of-care A1c today was 9.8.  She needs to continue to monitor her sugars, take a log with her to her endocrinology appointment and work on eating healthier.

## 2025-02-21 LAB
ALBUMIN SERPL-MCNC: 4.5 G/DL (ref 3.9–4.9)
ALBUMIN/CREAT UR: 18 MG/G CREAT (ref 0–29)
ALP SERPL-CCNC: 123 IU/L (ref 44–121)
ALT SERPL-CCNC: 19 IU/L (ref 0–32)
AST SERPL-CCNC: 15 IU/L (ref 0–40)
BASOPHILS # BLD AUTO: 0.1 X10E3/UL (ref 0–0.2)
BASOPHILS NFR BLD AUTO: 1 %
BILIRUB SERPL-MCNC: 0.3 MG/DL (ref 0–1.2)
BUN SERPL-MCNC: 11 MG/DL (ref 8–27)
BUN/CREAT SERPL: 14 (ref 12–28)
CALCIUM SERPL-MCNC: 9.5 MG/DL (ref 8.7–10.3)
CHLORIDE SERPL-SCNC: 102 MMOL/L (ref 96–106)
CHOLEST SERPL-MCNC: 174 MG/DL (ref 100–199)
CK SERPL-CCNC: 48 U/L (ref 32–182)
CO2 SERPL-SCNC: 25 MMOL/L (ref 20–29)
CREAT SERPL-MCNC: 0.79 MG/DL (ref 0.57–1)
CREAT UR-MCNC: 95.5 MG/DL
EGFRCR SERPLBLD CKD-EPI 2021: 83 ML/MIN/1.73
EOSINOPHIL # BLD AUTO: 0.5 X10E3/UL (ref 0–0.4)
EOSINOPHIL NFR BLD AUTO: 5 %
ERYTHROCYTE [DISTWIDTH] IN BLOOD BY AUTOMATED COUNT: 13.4 % (ref 11.7–15.4)
FOLATE SERPL-MCNC: 6.4 NG/ML
GLOBULIN SER CALC-MCNC: 2.5 G/DL (ref 1.5–4.5)
GLUCOSE SERPL-MCNC: 106 MG/DL (ref 70–99)
HCT VFR BLD AUTO: 43.8 % (ref 34–46.6)
HDLC SERPL-MCNC: 50 MG/DL
HGB BLD-MCNC: 14.1 G/DL (ref 11.1–15.9)
IMM GRANULOCYTES # BLD AUTO: 0.1 X10E3/UL (ref 0–0.1)
IMM GRANULOCYTES NFR BLD AUTO: 1 %
LDLC SERPL CALC-MCNC: 97 MG/DL (ref 0–99)
LYMPHOCYTES # BLD AUTO: 3.4 X10E3/UL (ref 0.7–3.1)
LYMPHOCYTES NFR BLD AUTO: 33 %
MCH RBC QN AUTO: 26.8 PG (ref 26.6–33)
MCHC RBC AUTO-ENTMCNC: 32.2 G/DL (ref 31.5–35.7)
MCV RBC AUTO: 83 FL (ref 79–97)
MICROALBUMIN UR-MCNC: 17.5 UG/ML
MONOCYTES # BLD AUTO: 0.6 X10E3/UL (ref 0.1–0.9)
MONOCYTES NFR BLD AUTO: 6 %
NEUTROPHILS # BLD AUTO: 5.6 X10E3/UL (ref 1.4–7)
NEUTROPHILS NFR BLD AUTO: 54 %
PLATELET # BLD AUTO: 313 X10E3/UL (ref 150–450)
POTASSIUM SERPL-SCNC: 4.6 MMOL/L (ref 3.5–5.2)
PROT SERPL-MCNC: 7 G/DL (ref 6–8.5)
RBC # BLD AUTO: 5.27 X10E6/UL (ref 3.77–5.28)
SODIUM SERPL-SCNC: 142 MMOL/L (ref 134–144)
T4 FREE SERPL-MCNC: 1.28 NG/DL (ref 0.82–1.77)
TRIGL SERPL-MCNC: 153 MG/DL (ref 0–149)
TSH SERPL DL<=0.005 MIU/L-ACNC: 1.23 UIU/ML (ref 0.45–4.5)
VIT B12 SERPL-MCNC: 298 PG/ML (ref 232–1245)
VLDLC SERPL CALC-MCNC: 27 MG/DL (ref 5–40)
WBC # BLD AUTO: 10.2 X10E3/UL (ref 3.4–10.8)

## 2025-03-04 ENCOUNTER — TELEPHONE (OUTPATIENT)
Dept: FAMILY MEDICINE CLINIC | Facility: CLINIC | Age: 65
End: 2025-03-04
Payer: MEDICARE

## 2025-03-04 NOTE — TELEPHONE ENCOUNTER
PT NEEDS CLARIFICATION ON WETHER SHE IS TO TAKE HER LISINOPRIL 1 X DAILY OR 2 X DAILY, SHE STATES SHE THOUGHT [facility] TOLD HER 40 MG A DAY BUT WS ONLY CALLED IN 20 MG TABLETS. PT ALSO HAS AN APPT ON 3/19 BUT STATES [facility] WANTED TO SEE HER AFTER SHE SEES A ENDOCRINOLOGIST. SHE STATES SHE DOESN'T SEE THEM TILL MAY AND WANTS TO KNOW IF SHE NEEDS TO BE SEEN AFTER THAT OR STILL COME IN ON 3/19

## 2025-03-05 NOTE — TELEPHONE ENCOUNTER
Called and gave pt instructions, she expressed verbal understanding. She will also be keeping her f/u as it is for her AWV.

## 2025-03-19 ENCOUNTER — OFFICE VISIT (OUTPATIENT)
Dept: FAMILY MEDICINE CLINIC | Facility: CLINIC | Age: 65
End: 2025-03-19
Payer: MEDICARE

## 2025-03-19 VITALS
OXYGEN SATURATION: 97 % | HEART RATE: 64 BPM | HEIGHT: 68 IN | WEIGHT: 242.9 LBS | SYSTOLIC BLOOD PRESSURE: 152 MMHG | BODY MASS INDEX: 36.81 KG/M2 | DIASTOLIC BLOOD PRESSURE: 86 MMHG

## 2025-03-19 DIAGNOSIS — E55.9 VITAMIN D DEFICIENCY: ICD-10-CM

## 2025-03-19 DIAGNOSIS — I10 PRIMARY HYPERTENSION: ICD-10-CM

## 2025-03-19 DIAGNOSIS — E66.812 CLASS 2 SEVERE OBESITY DUE TO EXCESS CALORIES WITH SERIOUS COMORBIDITY AND BODY MASS INDEX (BMI) OF 36.0 TO 36.9 IN ADULT: ICD-10-CM

## 2025-03-19 DIAGNOSIS — N18.32 TYPE 2 DIABETES MELLITUS WITH STAGE 3B CHRONIC KIDNEY DISEASE, WITHOUT LONG-TERM CURRENT USE OF INSULIN: ICD-10-CM

## 2025-03-19 DIAGNOSIS — Z78.0 MENOPAUSE: ICD-10-CM

## 2025-03-19 DIAGNOSIS — E11.22 TYPE 2 DIABETES MELLITUS WITH STAGE 3B CHRONIC KIDNEY DISEASE, WITHOUT LONG-TERM CURRENT USE OF INSULIN: ICD-10-CM

## 2025-03-19 DIAGNOSIS — E78.2 MIXED HYPERLIPIDEMIA: ICD-10-CM

## 2025-03-19 DIAGNOSIS — Z00.00 MEDICARE WELCOME VISIT: Primary | ICD-10-CM

## 2025-03-19 DIAGNOSIS — K21.9 GASTROESOPHAGEAL REFLUX DISEASE: ICD-10-CM

## 2025-03-19 DIAGNOSIS — M48.061 SPINAL STENOSIS OF LUMBAR REGION, UNSPECIFIED WHETHER NEUROGENIC CLAUDICATION PRESENT: ICD-10-CM

## 2025-03-19 DIAGNOSIS — Z12.31 SCREENING MAMMOGRAM FOR BREAST CANCER: ICD-10-CM

## 2025-03-19 DIAGNOSIS — E66.01 CLASS 2 SEVERE OBESITY DUE TO EXCESS CALORIES WITH SERIOUS COMORBIDITY AND BODY MASS INDEX (BMI) OF 36.0 TO 36.9 IN ADULT: ICD-10-CM

## 2025-03-19 DIAGNOSIS — I25.10 CHRONIC CORONARY ARTERY DISEASE: ICD-10-CM

## 2025-03-19 DIAGNOSIS — Z79.899 HIGH RISK MEDICATION USE: ICD-10-CM

## 2025-03-19 RX ORDER — PANTOPRAZOLE SODIUM 40 MG/1
40 TABLET, DELAYED RELEASE ORAL DAILY
Qty: 90 TABLET | Refills: 1 | Status: SHIPPED | OUTPATIENT
Start: 2025-03-19

## 2025-03-19 RX ORDER — LISINOPRIL AND HYDROCHLOROTHIAZIDE 12.5; 2 MG/1; MG/1
2 TABLET ORAL DAILY
Qty: 180 TABLET | Refills: 1 | Status: SHIPPED | OUTPATIENT
Start: 2025-03-19

## 2025-03-19 RX ORDER — CYCLOBENZAPRINE HCL 10 MG
10 TABLET ORAL 2 TIMES DAILY PRN
Qty: 45 TABLET | Refills: 1 | Status: SHIPPED | OUTPATIENT
Start: 2025-03-19

## 2025-03-19 RX ORDER — CARVEDILOL 3.12 MG/1
3.12 TABLET ORAL 2 TIMES DAILY
Qty: 180 TABLET | Refills: 1 | Status: SHIPPED | OUTPATIENT
Start: 2025-03-19

## 2025-03-19 RX ORDER — ATORVASTATIN CALCIUM 40 MG/1
40 TABLET, FILM COATED ORAL NIGHTLY
Qty: 90 TABLET | Refills: 1 | Status: SHIPPED | OUTPATIENT
Start: 2025-03-19

## 2025-03-19 NOTE — ASSESSMENT & PLAN NOTE
Patient's (Body mass index is 36.94 kg/m².) indicates that they are morbidly/severely obese (BMI > 40 or > 35 with obesity - related health condition) with health conditions that include hypertension, coronary heart disease, diabetes mellitus, dyslipidemias, and GERD . Weight is unchanged. BMI  is above average; BMI management plan is completed. We discussed low calorie, low carb based diet program, portion control, and increasing exercise.

## 2025-03-19 NOTE — ASSESSMENT & PLAN NOTE
Stable, continue current medications.    Orders:    atorvastatin (LIPITOR) 40 MG tablet; Take 1 tablet by mouth Every Night.    carvedilol (COREG) 3.125 MG tablet; Take 1 tablet by mouth 2 (Two) Times a Day.

## 2025-03-19 NOTE — ASSESSMENT & PLAN NOTE
Stable, continue pantoprazole.  Orders:    pantoprazole (PROTONIX) 40 MG EC tablet; Take 1 tablet by mouth Daily.

## 2025-03-19 NOTE — ASSESSMENT & PLAN NOTE
Has appointment coming up with endocrinology.  There are discrepancies in patient's chart on the type of diabetes.  I think she is probably type II and is only being treated with insulin.  I think she needs something to treat insulin resistance.  We are seeking the opinion of an endocrinologist.

## 2025-03-19 NOTE — ASSESSMENT & PLAN NOTE
Stable, continue atorvastatin.  Orders:    atorvastatin (LIPITOR) 40 MG tablet; Take 1 tablet by mouth Every Night.

## 2025-03-19 NOTE — PROGRESS NOTES
Subjective   The ABCs of the Annual Wellness Visit  Medicare Wellness Visit      Kristine Rivas is a 65 y.o. patient who presents for a Medicare Wellness Visit.    The following portions of the patient's history were reviewed and   updated as appropriate: allergies, current medications, past family history, past medical history, past social history, past surgical history, and problem list.    Compared to one year ago, the patient's physical   health is the same.  Compared to one year ago, the patient's mental   health is the same.    Recent Hospitalizations:  She was not admitted to the hospital during the last year.     Current Medical Providers:  Patient Care Team:  Theresa Keyes PA as PCP - General (Family Medicine)  Cici Muñoz OD (Optometry)    Outpatient Medications Prior to Visit   Medication Sig Dispense Refill    acetaminophen (TYLENOL) 500 MG tablet Take 2 tablets by mouth Every 6 (Six) Hours As Needed for Mild Pain.      aspirin 81 MG EC tablet Take 1 tablet by mouth Daily. 90 tablet 3    insulin NPH (humuLIN N,novoLIN N) 100 UNIT/ML injection Inject 32 Units under the skin into the appropriate area as directed Every Night. 9 each 2    insulin regular (humuLIN R,novoLIN R) 100 UNIT/ML injection Inject 18 Units under the skin into the appropriate area as directed 3 (Three) Times a Day Before Meals. 15-20 units at breakfast 15-20 units at lunch 25-45 units at dinner 9 each 2    meclizine (ANTIVERT) 25 MG tablet Take 1 tablet by mouth Every 6 (Six) Hours As Needed for dizziness. 20 tablet 0    Melatonin 10 MG tablet Take 1 tablet by mouth Every Night.      atorvastatin (LIPITOR) 40 MG tablet Take 1 tablet by mouth Every Night. 90 tablet 0    carvedilol (COREG) 3.125 MG tablet Take 1 tablet by mouth 2 (Two) Times a Day. 180 tablet 0    cyclobenzaprine (FLEXERIL) 10 MG tablet Take 1 tablet by mouth 2 (Two) Times a Day As Needed for Muscle Spasms. 30 tablet 0    lisinopril-hydrochlorothiazide  (PRINZIDE,ZESTORETIC) 20-12.5 MG per tablet Take 1 tablet by mouth Daily. 180 tablet 0    pantoprazole (PROTONIX) 40 MG EC tablet Take 1 tablet by mouth Daily. 90 tablet 0    glucose blood (WaveSense Presto) test strip 1 each by Other route 4 (Four) Times a Day. use 1 strip to test 4 times a day 360 each 4    Vitamins-Lipotropics (LIPO-FLAVONOID PLUS PO) Take  by mouth. (Patient not taking: Reported on 3/19/2025)       No facility-administered medications prior to visit.     No opioid medication identified on active medication list. I have reviewed chart for other potential  high risk medication/s and harmful drug interactions in the elderly.      Aspirin is on active medication list. Aspirin use is indicated based on review of current medical condition/s. Pros and cons of this therapy have been discussed today. Benefits of this medication outweigh potential harm.  Patient has been encouraged to continue taking this medication.  .      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  ACP discussion was held with the patient during this visit. Patient has an advance directive in EMR which is still valid.             Objective   Vitals:    03/19/25 1343   BP: 152/86   BP Location: Left arm   Patient Position: Sitting   Cuff Size: Large Adult   Pulse: 64   SpO2: 97%   Weight: 110 kg (242  "lb 14.4 oz)   Height: 172.7 cm (67.99\")       Estimated body mass index is 36.94 kg/m² as calculated from the following:    Height as of this encounter: 172.7 cm (67.99\").    Weight as of this encounter: 110 kg (242 lb 14.4 oz).    Gait and Balance Evaluation: Slow Tentative Pace            Does the patient have evidence of cognitive impairment? No  Lab Results   Component Value Date    CHLPL 174 02/20/2025    TRIG 153 (H) 02/20/2025    HDL 50 02/20/2025    LDL 97 02/20/2025    VLDL 27 02/20/2025    HGBA1C 9.8 (A) 02/20/2025                                                                                                Health  Risk Assessment    Smoking Status:  Social History     Tobacco Use   Smoking Status Every Day    Current packs/day: 0.25    Average packs/day: 0.8 packs/day for 19.2 years (16.1 ttl pk-yrs)    Types: Cigarettes    Start date: 7/9/1999    Last attempt to quit: 7/9/2014    Passive exposure: Past   Smokeless Tobacco Never     Alcohol Consumption:  Social History     Substance and Sexual Activity   Alcohol Use Yes    Comment: SOCIALLY       Fall Risk Screen  STEADI Fall Risk Assessment was completed, and patient is at LOW risk for falls.Assessment completed on:3/19/2025    Depression Screening   The PHQ has not been completed during this encounter.     Health Habits and Functional and Cognitive Screening:      3/17/2025     7:57 AM   Functional & Cognitive Status   Do you have difficulty preparing food and eating? No   Do you have difficulty bathing yourself, getting dressed or grooming yourself? No   Do you have difficulty using the toilet? No   Do you have difficulty moving around from place to place? No   Do you have trouble with steps or getting out of a bed or a chair? No   Current Diet Other   Dental Exam Unknown   Eye Exam Up to date   Exercise (times per week) Other   Current Exercises Include House Cleaning   Do you need help using the phone?  No   Are you deaf or do you have serious " difficulty hearing?  No   Do you need help to go to places out of walking distance? No   Do you need help shopping? No   Do you need help preparing meals?  No   Do you need help with housework?  No   Do you need help with laundry? No   Do you need help taking your medications? No   Do you need help managing money? No   Do you ever drive or ride in a car without wearing a seat belt? No   Have you felt unusual stress, anger or loneliness in the last month? No   Who do you live with? Child   If you need help, do you have trouble finding someone available to you? No   Have you been bothered in the last four weeks by sexual problems? No   Do you have difficulty concentrating, remembering or making decisions? No           Age-appropriate Screening Schedule:  Refer to the list below for future screening recommendations based on patient's age, sex and/or medical conditions. Orders for these recommended tests are listed in the plan section. The patient has been provided with a written plan.    Health Maintenance List  Health Maintenance   Topic Date Due    DXA SCAN  Never done    ANNUAL WELLNESS VISIT  Never done    PAP SMEAR  08/04/2020    MAMMOGRAM  02/16/2024    ZOSTER VACCINE (1 of 2) 03/19/2025 (Originally 1/19/2010)    COVID-19 Vaccine (5 - 2024-25 season) 05/12/2025 (Originally 9/1/2024)    DIABETIC EYE EXAM  07/17/2025    HEMOGLOBIN A1C  08/20/2025    LIPID PANEL  02/20/2026    URINE MICROALBUMIN-CREATININE RATIO (uACR)  02/20/2026    BMI FOLLOWUP  03/19/2026    TDAP/TD VACCINES (2 - Td or Tdap) 07/01/2026    COLORECTAL CANCER SCREENING  09/11/2034    HEPATITIS C SCREENING  Completed    INFLUENZA VACCINE  Completed    Pneumococcal Vaccine 50+  Completed                                                                                                                                                CMS Preventative Services Quick Reference  Risk Factors Identified During Encounter  Immunizations Discussed/Encouraged:  "Shingrix and COVID19    The above risks/problems have been discussed with the patient.  Pertinent information has been shared with the patient in the After Visit Summary.  An After Visit Summary and PPPS were made available to the patient.    Follow Up:   Next Medicare Wellness visit to be scheduled in 1 year.         Additional E&M Note during same encounter follows:  Patient has additional, significant, and separately identifiable condition(s)/problem(s) that require work above and beyond the Medicare Wellness Visit     Chief Complaint  Medicare Wellness-Initial Visit, Hypertension, Heartburn, and Hyperlipidemia    Subjective   Labs done last visit, needs to follow-up on hypertension, hyperlipidemia and reflux.      Kristine is also being seen today for additional medical problem/s.    Review of Systems   Respiratory:  Negative for shortness of breath.    Cardiovascular:  Negative for chest pain, palpitations and leg swelling.              Objective   Vital Signs:  /86 (BP Location: Left arm, Patient Position: Sitting, Cuff Size: Large Adult)   Pulse 64   Ht 172.7 cm (67.99\")   Wt 110 kg (242 lb 14.4 oz)   SpO2 97%   BMI 36.94 kg/m²   Physical Exam  Constitutional:       Appearance: Normal appearance. She is obese.   Cardiovascular:      Rate and Rhythm: Normal rate and regular rhythm.   Pulmonary:      Effort: Pulmonary effort is normal.      Breath sounds: Normal breath sounds.   Musculoskeletal:      Cervical back: Normal range of motion and neck supple.   Neurological:      Mental Status: She is alert and oriented to person, place, and time.   Psychiatric:         Mood and Affect: Mood normal.         Behavior: Behavior normal.         Thought Content: Thought content normal.         Judgment: Judgment normal.                    Assessment and Plan      Medicare welcome visit  Updated annual wellness visit checklist.  Immunizations discussed.  Screening up-to-date.  Recommend yearly dental and eye " exams. Also discussed monitoring of blood pressure and lipids. We addressed patient self-assessment of health status, frailty, and physical functioning. We reviewed psychosocial risks, behavioral risks, instrumental activities of daily living, and patient health risk assessment. Patient was given a personalized prevention plan.         Primary hypertension  Highest home reading, 120/65.  Stable, continue current medications.    Orders:    lisinopril-hydrochlorothiazide (PRINZIDE,ZESTORETIC) 20-12.5 MG per tablet; Take 2 tablets by mouth Daily.    carvedilol (COREG) 3.125 MG tablet; Take 1 tablet by mouth 2 (Two) Times a Day.    Menopause    Orders:    DEXA Bone Density Axial; Future    Screening mammogram for breast cancer    Orders:    Mammo Screening Digital Tomosynthesis Bilateral With CAD; Future    Chronic coronary artery disease  Stable, continue current medications.    Orders:    atorvastatin (LIPITOR) 40 MG tablet; Take 1 tablet by mouth Every Night.    carvedilol (COREG) 3.125 MG tablet; Take 1 tablet by mouth 2 (Two) Times a Day.    Mixed hyperlipidemia   Stable, continue atorvastatin.  Orders:    atorvastatin (LIPITOR) 40 MG tablet; Take 1 tablet by mouth Every Night.    Spinal stenosis of lumbar region, unspecified whether neurogenic claudication present  Continue cyclobenzaprine as needed.  Orders:    cyclobenzaprine (FLEXERIL) 10 MG tablet; Take 1 tablet by mouth 2 (Two) Times a Day As Needed for Muscle Spasms.    Gastroesophageal reflux disease  Stable, continue pantoprazole.  Orders:    pantoprazole (PROTONIX) 40 MG EC tablet; Take 1 tablet by mouth Daily.    Class 2 severe obesity due to excess calories with serious comorbidity and body mass index (BMI) of 36.0 to 36.9 in adult  Patient's (Body mass index is 36.94 kg/m².) indicates that they are morbidly/severely obese (BMI > 40 or > 35 with obesity - related health condition) with health conditions that include hypertension, coronary heart disease,  diabetes mellitus, dyslipidemias, and GERD . Weight is unchanged. BMI  is above average; BMI management plan is completed. We discussed low calorie, low carb based diet program, portion control, and increasing exercise.          Type 2 diabetes mellitus with stage 3b chronic kidney disease, without long-term current use of insulin  Has appointment coming up with endocrinology.  There are discrepancies in patient's chart on the type of diabetes.  I think she is probably type II and is only being treated with insulin.  I think she needs something to treat insulin resistance.  We are seeking the opinion of an endocrinologist.           Vitamin D deficiency         High risk medication use                 Follow Up   Return in about 6 months (around 9/19/2025) for 30 minute med recheck with labs one week prior.  Patient was given instructions and counseling regarding her condition or for health maintenance advice. Please see specific information pulled into the AVS if appropriate.

## 2025-03-19 NOTE — ASSESSMENT & PLAN NOTE
Highest home reading, 120/65.  Stable, continue current medications.    Orders:    lisinopril-hydrochlorothiazide (PRINZIDE,ZESTORETIC) 20-12.5 MG per tablet; Take 2 tablets by mouth Daily.    carvedilol (COREG) 3.125 MG tablet; Take 1 tablet by mouth 2 (Two) Times a Day.

## 2025-03-23 NOTE — PROGRESS NOTES
I have reviewed the notes, assessments, and/or procedures performed by Theresa Keyes PA-C, I concur with her/his documentation of Kristine Rivas.

## 2025-05-21 ENCOUNTER — PATIENT ROUNDING (BHMG ONLY) (OUTPATIENT)
Dept: ENDOCRINOLOGY | Facility: CLINIC | Age: 65
End: 2025-05-21
Payer: COMMERCIAL

## 2025-05-21 ENCOUNTER — OFFICE VISIT (OUTPATIENT)
Dept: ENDOCRINOLOGY | Facility: CLINIC | Age: 65
End: 2025-05-21
Payer: COMMERCIAL

## 2025-05-21 VITALS
WEIGHT: 237.3 LBS | SYSTOLIC BLOOD PRESSURE: 144 MMHG | OXYGEN SATURATION: 96 % | BODY MASS INDEX: 35.97 KG/M2 | DIASTOLIC BLOOD PRESSURE: 82 MMHG | HEIGHT: 68 IN | HEART RATE: 69 BPM

## 2025-05-21 DIAGNOSIS — G57.90 NEUROPATHY OF FOOT, UNSPECIFIED LATERALITY: ICD-10-CM

## 2025-05-21 DIAGNOSIS — N18.32 TYPE 2 DIABETES MELLITUS WITH STAGE 3B CHRONIC KIDNEY DISEASE, WITHOUT LONG-TERM CURRENT USE OF INSULIN: Primary | ICD-10-CM

## 2025-05-21 DIAGNOSIS — E11.22 TYPE 2 DIABETES MELLITUS WITH STAGE 3B CHRONIC KIDNEY DISEASE, WITHOUT LONG-TERM CURRENT USE OF INSULIN: Primary | ICD-10-CM

## 2025-05-21 DIAGNOSIS — I10 PRIMARY HYPERTENSION: ICD-10-CM

## 2025-05-21 LAB
EXPIRATION DATE: ABNORMAL
EXPIRATION DATE: NORMAL
GLUCOSE BLDC GLUCOMTR-MCNC: 111 MG/DL (ref 70–130)
HBA1C MFR BLD: 8.6 % (ref 4.5–5.7)
Lab: ABNORMAL
Lab: NORMAL

## 2025-05-21 RX ORDER — DULOXETIN HYDROCHLORIDE 30 MG/1
30 CAPSULE, DELAYED RELEASE ORAL DAILY
Qty: 30 CAPSULE | Refills: 2 | Status: SHIPPED | OUTPATIENT
Start: 2025-05-21

## 2025-05-21 RX ORDER — SEMAGLUTIDE 0.68 MG/ML
0.25 INJECTION, SOLUTION SUBCUTANEOUS WEEKLY
Start: 2025-05-21

## 2025-05-21 NOTE — PROGRESS NOTES
A [employer] message has been sent to the patient for patient rounding with [facility]

## 2025-05-21 NOTE — PATIENT INSTRUCTIONS
Diabetes Treatment Recommendations  Patient     Kristine Rivas     Date:        05/21/25     ADA General Goals: A1c: < 7%                                                                                   Your A1C is   Lab Results   Component Value Date    HGBA1C 8.6 (A) 05/21/2025    HGBA1C 9.8 (A) 02/20/2025    HGBA1C 8.60 (H) 10/18/2023     Fasting/before meal glucose: <150 mg/dL                                    2 Hour after meal glucoses: < 180 mg/dL                                        Bedtime glucose:120-180                                                                Glucose testing frequency:     Medication Changes:    Insulin dosing:  Basal insulin (Long acting) NPH   24 units in morning; 34 at bedtime             Mealtime/Bolus Insulin (Short acting) Regular  20 units before average meals/10 units with small meal.   Add additional correction insulin if blood sugar before meal is more than 150:   If skipping meal/4 hours since last injection and blood sugar is above 150 use correction insulin only:    Correction insulin (add to meal insulin)   0 unit  if glucose  less than 150   2 unit   if glucose  150 - 199   4 units if glucose 200 - 249   6 units if glucose 250 - 299   8 units if glucose 300 - 349   10  units if glucose Greater than 350     Keep records of your glucose levels and insulin adjustments.   We may ask you to keep records on the content of your meals with insulin doses and before/after meal glucose levels to evaluate your ratios.  Call for advice if you have unexplained or unexpected hypoglycemia  (glucose < 70) or persistent high glucose > 250.  Office: [phone]    Jessica Gregorio PA-C

## 2025-05-21 NOTE — PROGRESS NOTES
Chief Complaint   Patient presents with    Diabetes        HPI  Kristine Rivas is a 65 y.o. female presents today for evaluation of diabetes. Referring Provider: Theresa Keyes PA.     Hemoglobin A1C   Date Value Ref Range Status   05/21/2025 8.6 (A) 4.5 - 5.7 % Final     Currently taking   NPH 20 am/10-20 afternoon depending on meal/30 hs  Regular insulin before meal 20 am/25-30 afternoon/45 in evening taking mostly before meal.     Concerns for weight gain with insulin use and after back surgery.   Hx Neuropathy secondary to spinal stenosis.  Limited mobility due to neuropathy, weight gain.    PT in past without improvement.   Takes cyclobenzaprine as needed. Has tried gabapentin/pregabalin with intolerance.     - BG at home: fasting low 100-140s; after lunch 200-300; evening 100-high 200s  - BP at home: 120-140 SP/65-85 DP; reports elevated at office visits    - Admits frequent hypoglycemia overnight, occasional during day     - Denies vision changes.   - Denies heartburn/reflux, constipation, diarrhea, abdominal pain.  - Denies increased thirst or urination.   - Denies burning with urination.   - Denies SOB, chest pain.    Medical history: Diabetes, hypertension, hyperlipidemia, CAD s/p CABG 2014, GERD, low back pain    Diabetes:   Diagnosed with type 2 diabetes: age 35; told during pregnancy needed to be on insulin; unknown testing for ANITHA in past   Complications: CAD, possible retinopathy  Admits family hx of diabetes: father, paternal relatives    Current regimen includes: NPH, insulin regular   Previous medications: metformin (loose stools)   Compliance with medications is fair.  - HTN: lisinopril-HCTZ, carvedilol    - HLD: atorvastatin   - Aspirin: 81 mg     Denies history of pancreatitis.  Denies family history of MENs, thyroid cancers.     DM Health Maintenance:  Ophthalmology: 2/2025; possible mild retinopathy; follow-up q6m   Monofilament / Foot exam:performed today  Urine microalbumin: cr: 18,  2025   LDL: 97 2025    Diet: Meals: 1x/day in evening occasional toast/cheese in morning Drinks: water, coffee with creamer/sugar free  Exercise: minimal due to chronic pain     The following portions of the patient's history were reviewed and updated as appropriate: allergies, current medications, past family history, past medical history, past social history, past surgical history and problem list.     Past Medical History:   Diagnosis Date    AC (acromioclavicular) joint bone spurs     Diabetes mellitus     GERD (gastroesophageal reflux disease)     Herpes zoster     Hyperlipidemia     Hypertension     Myocardial infarction      Past Surgical History:   Procedure Laterality Date    BACK SURGERY      BREAST EXCISIONAL BIOPSY Right     benign    BREAST LUMPECTOMY      BREAST LUMPECTOMY      for benign lump    CARDIAC SURGERY  2014     SECTION      CORONARY ARTERY BYPASS GRAFT      D & C WITH SUCTION      times 3 -miscarriages    KNEE SURGERY      LUMBAR DISCECTOMY FUSION INSTRUMENTATION Bilateral 2019    Procedure: Lumbar Laminectomy Lumbar 4 Lumbar 5 with posterior interbody allograft fusion using carbon fiber interbody device and posterior pedicle titanium screw fixation;  Surgeon: Harshad Madera MD;  Location: [facility];  Service: Neurosurgery      Family History   Problem Relation Age of Onset    Hypertension Father     Diabetes Father     Glaucoma Father     Hypotension Father     Kidney failure Father     Anxiety disorder Father     Hypertension Sister     Diabetes Sister     Anxiety disorder Sister     Colon polyps Sister     Breast cancer Sister     Osteoporosis Mother     Cataracts Mother     Heart failure Mother     Diverticulitis Mother     Colon polyps Brother     Colon polyps Maternal Uncle     Colon cancer Maternal Uncle     Breast cancer Sister     Diabetes Sister     Heart disease Sister 65        stent at age 65    No Known Problems Sister     Reta  Hyperthermia Neg Hx       Social History     Socioeconomic History    Marital status: Legally    Tobacco Use    Smoking status: Every Day     Current packs/day: 0.25     Average packs/day: 0.8 packs/day for 19.4 years (16.1 ttl pk-yrs)     Types: Cigarettes     Start date: 7/9/1999     Last attempt to quit: 7/9/2014     Passive exposure: Past    Smokeless tobacco: Never   Vaping Use    Vaping status: Never Used   Substance and Sexual Activity    Alcohol use: Yes     Comment: SOCIALLY    Drug use: No    Sexual activity: Defer      No Known Allergies   Current Outpatient Medications on File Prior to Visit   Medication Sig Dispense Refill    acetaminophen (TYLENOL) 500 MG tablet Take 2 tablets by mouth Every 6 (Six) Hours As Needed for Mild Pain.      aspirin 81 MG EC tablet Take 1 tablet by mouth Daily. 90 tablet 3    atorvastatin (LIPITOR) 40 MG tablet Take 1 tablet by mouth Every Night. 90 tablet 1    carvedilol (COREG) 3.125 MG tablet Take 1 tablet by mouth 2 (Two) Times a Day. 180 tablet 1    cyclobenzaprine (FLEXERIL) 10 MG tablet Take 1 tablet by mouth 2 (Two) Times a Day As Needed for Muscle Spasms. 45 tablet 1    lisinopril-hydrochlorothiazide (PRINZIDE,ZESTORETIC) 20-12.5 MG per tablet Take 2 tablets by mouth Daily. 180 tablet 1    meclizine (ANTIVERT) 25 MG tablet Take 1 tablet by mouth Every 6 (Six) Hours As Needed for dizziness. 20 tablet 0    Melatonin 10 MG tablet Take 1 tablet by mouth Every Night.      pantoprazole (PROTONIX) 40 MG EC tablet Take 1 tablet by mouth Daily. 90 tablet 1    [DISCONTINUED] insulin NPH (humuLIN N,novoLIN N) 100 UNIT/ML injection Inject 32 Units under the skin into the appropriate area as directed Every Night. 9 each 2    [DISCONTINUED] insulin regular (humuLIN R,novoLIN R) 100 UNIT/ML injection Inject 18 Units under the skin into the appropriate area as directed 3 (Three) Times a Day Before Meals. 15-20 units at breakfast 15-20 units at lunch 25-45 units at dinner 9  "each 2     No current facility-administered medications on file prior to visit.        Review of Systems   Constitutional:  Negative for activity change, appetite change, unexpected weight gain and unexpected weight loss.   HENT:  Positive for postnasal drip, sinus pressure, sneezing and tinnitus.    Eyes:  Positive for discharge and itching. Negative for visual disturbance.   Respiratory:  Negative for shortness of breath.    Cardiovascular:  Negative for chest pain.   Gastrointestinal:  Positive for diarrhea. Negative for abdominal pain and constipation.   Endocrine: Negative for polydipsia and polyuria.   Musculoskeletal:  Positive for back pain.   Skin:  Negative for wound.   Neurological:  Negative for dizziness and numbness.        /82 (BP Location: Right arm, Patient Position: Sitting, Cuff Size: Adult)   Pulse 69   Ht 172.7 cm (67.99\")   Wt 108 kg (237 lb 4.8 oz)   SpO2 96%   BMI 36.09 kg/m²      Physical Exam  Constitutional:       Appearance: Normal appearance. She is obese.   Cardiovascular:      Rate and Rhythm: Normal rate and regular rhythm.      Pulses:           Dorsalis pedis pulses are 2+ on the right side and 2+ on the left side.        Posterior tibial pulses are 2+ on the right side and 2+ on the left side.   Pulmonary:      Effort: Pulmonary effort is normal.   Musculoskeletal:         General: Normal range of motion.      Right foot: No deformity.      Left foot: No deformity.   Feet:      Right foot:      Protective Sensation: 5 sites tested.  5 sites sensed.      Skin integrity: Skin integrity normal. No ulcer.      Toenail Condition: Right toenails are abnormally thick.      Left foot:      Protective Sensation: 5 sites tested.  5 sites sensed.      Skin integrity: Skin integrity normal. No ulcer.      Toenail Condition: Left toenails are abnormally thick.      Comments: Diabetic Foot Exam Performed and Monofilament Test Performed     Skin:     General: Skin is warm and dry. "   Neurological:      General: No focal deficit present.      Mental Status: She is alert and oriented to person, place, and time.   Psychiatric:         Mood and Affect: Mood normal.         Behavior: Behavior normal.         LABS AND IMAGING  CMP:  Lab Results   Component Value Date    Glucose 111 05/21/2025    Glucose, UA Negative 04/23/2019    BUN 11 02/20/2025    BUN 11 10/18/2023    BUN/Creatinine Ratio 14 02/20/2025    BUN/Creatinine Ratio 13.8 10/18/2023    Creatinine 0.79 02/20/2025    Creatinine 0.80 10/18/2023    Creatinine 0.80 02/19/2020    Creatinine, Urine 95.5 02/20/2025    Creatinine, Urine 143.2 02/26/2023    eGFR 82.9 10/18/2023    eGFR  Am 88 02/08/2021    eGFR Non African Amer 72 01/05/2022    EGFR Result 83 02/20/2025    Ketones, UA Negative 04/23/2019    CO2 30.0 (H) 10/18/2023    Total CO2 25 02/20/2025    Calcium 9.5 02/20/2025    Calcium 9.6 10/18/2023    Albumin 4.5 02/20/2025    Albumin 4.4 10/18/2023    AST (SGOT) 15 02/20/2025    AST (SGOT) 18 10/18/2023    Astrovirus Not Detected 04/23/2019    ALT (SGPT) 19 02/20/2025    ALT (SGPT) 20 10/18/2023     Lipid Panel:  Lab Results   Component Value Date    CHOL 211 (H) 10/18/2023    TRIG 153 (H) 02/20/2025    HDL 50 02/20/2025    VLDL 27 02/20/2025    LDL 97 02/20/2025     HbA1c:  Hemoglobin A1C   Date Value Ref Range Status   05/21/2025 8.6 (A) 4.5 - 5.7 % Final     Glucose:  Lab Results   Component Value Date    POCGLU 111 05/21/2025     Microalbumin:  Lab Results   Component Value Date    MALBCRERATIO 18 02/20/2025     TSH:  Lab Results   Component Value Date    TSH 1.230 02/20/2025       Assessment and Plan    Diagnoses and all orders for this visit:    1. Type 2 diabetes mellitus with stage 3b chronic kidney disease, without long-term current use of insulin (Primary)  Assessment & Plan:  Diabetes is not controlled with hyperglycemia and frequent hypoglycemia.   A1C is 8.6% today  Unknown prior testing for ANITHA; given concerns for  insurance at this time defer to next OV.     Discussed starting GLP1a/Ozempic 0.25 mg weekly if able to qualify with insurance; patient assistance forms provided today.   Training on administration provided and side effects reviewed.   Cautioned risk for GI intolerance.       Discussed hypoglycemia likely due to high dose regular insulin vs TID NPH.  Estimated total daily insulin 95 units. Discussed dividing this between intermediate/bolus   Discussed use of correction scale as needed.    Rx:   - NPH 24 units in morning; 34 at bedtime  - Regular 20 units before average meals/10 units with small meal.   Add additional correction insulin if blood sugar before meal is more than 150:   If skipping meal/4 hours since last injection and blood sugar is above 150 use correction insulin only:    Correction insulin (add to meal insulin)   0 unit  if glucose  less than 150   2 unit   if glucose  150 - 199   4 units if glucose 200 - 249   6 units if glucose 250 - 299   8 units if glucose 300 - 349   10  units if glucose Greater than 350     Cautioned risk for hypoglycemia; advised glucose supplement as needed.    Foot exam today  Microalbumin due 2/2026  Eye exam due around 8/2025  LDL at goal <100; continue atorvastatin     Discussed complications associated with diabetes.   Discussed BG goals  fasting, <180 2-hours postprandial.   Encouraged adherence with taking medications;    Encouraged continued BG monitoring.   Encouraged continued effort toward lifestyle management:         Increase lean protein and vegetable intake  Avoid concentrated sugar drinks, such as sodas, and processed carbs including crackers, cookies, cakes  Routine physical activity.     Orders:  -     POC Glycosylated Hemoglobin (Hb A1C)  -     POC Glucose, Blood  -     Semaglutide,0.25 or 0.5MG/DOS, (Ozempic, 0.25 or 0.5 MG/DOSE,) 2 MG/3ML solution pen-injector; Inject 0.25 mg under the skin into the appropriate area as directed 1 (One) Time Per  Week. Patient assistance form pending  -     insulin NPH (humuLIN N,novoLIN N) 100 UNIT/ML injection; Inject 24 Units under the skin into the appropriate area as directed Every Morning AND 34 Units Every Night.  Dispense: 15 mL; Refill: 2  -     insulin regular (humuLIN R,novoLIN R) 100 UNIT/ML injection; Inject 20 Units under the skin into the appropriate area as directed 3 (Three) Times a Day Before Meals. Add additional 2 units:50 mg/dl over 150. Max daily dose 90 units.  Dispense: 30 mL; Refill: 2    2. Neuropathy of foot, unspecified laterality  Assessment & Plan:  Discussed trial duloxetine to see if helps with neuropathic pain; patient interested in this.   Cautioned risk for mood changes, allergy.    Orders:  -     DULoxetine (CYMBALTA) 30 MG capsule; Take 1 capsule by mouth Daily.  Dispense: 30 capsule; Refill: 2    3. Primary hypertension  Assessment & Plan:  Elevated in office today, reports hx elevation at office visits.  Home readings with improvement per patient.  Continue current regimen per pcp.            Return in about 3 months (around 8/21/2025) for follow-up diabetes. The patient was instructed to contact the clinic with any interval questions or concerns.    Electronically signed by: Jessica Gregorio PA-C   Endocrinology    Please note that portions of this note were completed with a voice recognition program.

## 2025-05-21 NOTE — ASSESSMENT & PLAN NOTE
Elevated in office today, reports hx elevation at office visits.  Home readings with improvement per patient.  Continue current regimen per pcp.

## 2025-05-21 NOTE — ASSESSMENT & PLAN NOTE
Diabetes is not controlled with hyperglycemia and frequent hypoglycemia.   A1C is 8.6% today  Unknown prior testing for ANITHA; given concerns for insurance at this time defer to next OV.     Discussed starting GLP1a/Ozempic 0.25 mg weekly if able to qualify with insurance; patient assistance forms provided today.   Training on administration provided and side effects reviewed.   Cautioned risk for GI intolerance.       Discussed hypoglycemia likely due to high dose regular insulin vs TID NPH.  Estimated total daily insulin 95 units. Discussed dividing this between intermediate/bolus   Discussed use of correction scale as needed.    Rx:   - NPH 24 units in morning; 34 at bedtime  - Regular 20 units before average meals/10 units with small meal.   Add additional correction insulin if blood sugar before meal is more than 150:   If skipping meal/4 hours since last injection and blood sugar is above 150 use correction insulin only:    Correction insulin (add to meal insulin)   0 unit  if glucose  less than 150   2 unit   if glucose  150 - 199   4 units if glucose 200 - 249   6 units if glucose 250 - 299   8 units if glucose 300 - 349   10  units if glucose Greater than 350     Cautioned risk for hypoglycemia; advised glucose supplement as needed.    Foot exam today  Microalbumin due 2/2026  Eye exam due around 8/2025  LDL at goal <100; continue atorvastatin     Discussed complications associated with diabetes.   Discussed BG goals  fasting, <180 2-hours postprandial.   Encouraged adherence with taking medications;    Encouraged continued BG monitoring.   Encouraged continued effort toward lifestyle management:         Increase lean protein and vegetable intake  Avoid concentrated sugar drinks, such as sodas, and processed carbs including crackers, cookies, cakes  Routine physical activity.

## 2025-05-21 NOTE — ASSESSMENT & PLAN NOTE
Discussed trial duloxetine to see if helps with neuropathic pain; patient interested in this.   Cautioned risk for mood changes, allergy.

## 2025-05-30 DIAGNOSIS — M48.061 SPINAL STENOSIS OF LUMBAR REGION, UNSPECIFIED WHETHER NEUROGENIC CLAUDICATION PRESENT: ICD-10-CM

## 2025-05-30 RX ORDER — CYCLOBENZAPRINE HCL 10 MG
10 TABLET ORAL 2 TIMES DAILY PRN
Qty: 45 TABLET | Refills: 0 | Status: SHIPPED | OUTPATIENT
Start: 2025-05-30

## 2025-06-03 ENCOUNTER — TELEPHONE (OUTPATIENT)
Dept: FAMILY MEDICINE CLINIC | Facility: CLINIC | Age: 65
End: 2025-06-03
Payer: COMMERCIAL

## 2025-06-03 DIAGNOSIS — R92.8 ABNORMALITY OF LEFT BREAST ON SCREENING MAMMOGRAM: Primary | ICD-10-CM

## 2025-06-03 NOTE — TELEPHONE ENCOUNTER
Left pt VM to call back.  Screening mammogram left breast shows some calcifications that need additional imaging.  Most of the time these do not turn out to be anything significant but we do need to proceed with a diagnostic mammogram.  Does she want to have this done at the [facility] in [place] or if she okay having done with a [facility] location in [place].  Please let me know where she wants to have done and I will order.

## 2025-08-01 DIAGNOSIS — R92.8 ABNORMALITY OF LEFT BREAST ON SCREENING MAMMOGRAM: ICD-10-CM

## 2025-08-19 DIAGNOSIS — G57.90 NEUROPATHY OF FOOT, UNSPECIFIED LATERALITY: ICD-10-CM

## 2025-08-19 RX ORDER — DULOXETIN HYDROCHLORIDE 30 MG/1
30 CAPSULE, DELAYED RELEASE ORAL DAILY
Qty: 60 CAPSULE | Refills: 0 | Status: SHIPPED | OUTPATIENT
Start: 2025-08-19

## (undated) DEVICE — OIL CARTRIDGE: Brand: CORE, MAESTRO

## (undated) DEVICE — DIFFUSER: Brand: CORE, MAESTRO

## (undated) DEVICE — 3M™ STERI-STRIP™ ANTIMICROBIAL SKIN CLOSURES 1/2 INCH X 4 INCHES 50/CARTON 4 CARTONS/CASE A1847: Brand: 3M™ STERI-STRIP™

## (undated) DEVICE — QUINCKE POINT SPINAL NEEDLE, BLACK: Brand: RELI

## (undated) DEVICE — 3M™ STERI-STRIP™ REINFORCED ADHESIVE SKIN CLOSURES, R1547, 1/2 IN X 4 IN (12 MM X 100 MM), 6 STRIPS/ENVELOPE: Brand: 3M™ STERI-STRIP™

## (undated) DEVICE — SKIN PREP TRAY W/CHG: Brand: MEDLINE INDUSTRIES, INC.

## (undated) DEVICE — 3.0MM TAPERED ROUTER

## (undated) DEVICE — SMOKE EVACUATION TUBING WITH 7/8 IN TO 1/4 IN REDUCER: Brand: BUFFALO FILTER

## (undated) DEVICE — GLV SURG BIOGEL M LTX PF 8

## (undated) DEVICE — DISPOSABLE BIPOLAR CABLE 12FT. (3.6M): Brand: KIRWAN

## (undated) DEVICE — SUT VIC UD BR COAT OS/4 0 27IN SLVR J694H

## (undated) DEVICE — 1 ML TUBERCULIN SYRINGE REGULAR TIP: Brand: MONOJECT

## (undated) DEVICE — SUT VIC 3/0 X1 27IN J458H

## (undated) DEVICE — APPL CHLORAPREP W/TINT 26ML ORNG

## (undated) DEVICE — PK NEURO SPINE 40

## (undated) DEVICE — 3.0MM NEURO (MATCH HEAD) LESS AGGRESSIVE

## (undated) DEVICE — SYR CONTRL LUERLOK 10CC

## (undated) DEVICE — DRSNG WND GZ PAD BORDERED 4X8IN STRL

## (undated) DEVICE — NDL SPINE 20G 3 1/2 YEL STRL 1P/U

## (undated) DEVICE — COTTON BALLS, DOUBLE STRUNG: Brand: DEROYAL

## (undated) DEVICE — DRP C/ARM 41X74IN

## (undated) DEVICE — SPNG GZ WOVN 4X4IN 12PLY 10/BX STRL

## (undated) DEVICE — CONN TBG Y 5 IN 1 LF STRL

## (undated) DEVICE — DRSNG TELFA ILND ADH 4X6IN

## (undated) DEVICE — SYR LUERLOK 50ML

## (undated) DEVICE — DRP MICROSCOPE 4 BINOCULAR CV 54X150IN